# Patient Record
Sex: FEMALE | Race: WHITE | NOT HISPANIC OR LATINO | Employment: OTHER | URBAN - METROPOLITAN AREA
[De-identification: names, ages, dates, MRNs, and addresses within clinical notes are randomized per-mention and may not be internally consistent; named-entity substitution may affect disease eponyms.]

---

## 2019-07-28 ENCOUNTER — HOSPITAL ENCOUNTER (INPATIENT)
Facility: HOSPITAL | Age: 69
LOS: 6 days | Discharge: HOME | End: 2019-08-03
Attending: EMERGENCY MEDICINE | Admitting: OBSTETRICS & GYNECOLOGY

## 2019-07-28 DIAGNOSIS — D64.9 SYMPTOMATIC ANEMIA: ICD-10-CM

## 2019-07-28 DIAGNOSIS — C54.1 ENDOMETRIAL CANCER (HCC): Primary | ICD-10-CM

## 2019-07-28 PROBLEM — D50.9 MICROCYTIC ANEMIA: Status: ACTIVE | Noted: 2019-07-28

## 2019-07-28 PROBLEM — I10 HTN (HYPERTENSION): Status: ACTIVE | Noted: 2019-07-28

## 2019-07-28 PROBLEM — N95.0 POSTMENOPAUSAL BLEEDING: Status: ACTIVE | Noted: 2019-07-28

## 2019-07-28 PROBLEM — R01.1 HEART MURMUR: Status: ACTIVE | Noted: 2019-07-28

## 2019-07-28 PROBLEM — D75.839 THROMBOCYTOSIS: Status: ACTIVE | Noted: 2019-07-28

## 2019-07-28 PROBLEM — E78.5 HLD (HYPERLIPIDEMIA): Status: ACTIVE | Noted: 2019-07-28

## 2019-07-28 PROBLEM — R92.8 ABNORMAL MAMMOGRAM: Status: ACTIVE | Noted: 2019-07-28

## 2019-07-28 PROBLEM — R73.03 PREDIABETES: Status: ACTIVE | Noted: 2019-07-28

## 2019-07-28 LAB
ABO GROUP BLD: NORMAL
ABO GROUP BLD: NORMAL
ANION GAP SERPL CALCULATED.3IONS-SCNC: 17 MMOL/L (ref 5–15)
BASOPHILS # BLD AUTO: 0.05 10*3/MM3 (ref 0–0.2)
BASOPHILS NFR BLD AUTO: 0.5 % (ref 0–1.5)
BLD GP AB SCN SERPL QL: NEGATIVE
BUN BLD-MCNC: 7 MG/DL (ref 8–23)
BUN/CREAT SERPL: 9.1 (ref 7–25)
CALCIUM SPEC-SCNC: 9.6 MG/DL (ref 8.6–10.5)
CHLORIDE SERPL-SCNC: 92 MMOL/L (ref 98–107)
CO2 SERPL-SCNC: 24 MMOL/L (ref 22–29)
CREAT BLD-MCNC: 0.77 MG/DL (ref 0.57–1)
DEPRECATED RDW RBC AUTO: 47.6 FL (ref 37–54)
EOSINOPHIL # BLD AUTO: 0.02 10*3/MM3 (ref 0–0.4)
EOSINOPHIL NFR BLD AUTO: 0.2 % (ref 0.3–6.2)
ERYTHROCYTE [DISTWIDTH] IN BLOOD BY AUTOMATED COUNT: 21.4 % (ref 12.3–15.4)
FERRITIN SERPL-MCNC: 20.98 NG/ML (ref 13–150)
GFR SERPL CREATININE-BSD FRML MDRD: 75 ML/MIN/1.73
GLUCOSE BLD-MCNC: 132 MG/DL (ref 65–99)
HCT VFR BLD AUTO: 25.3 % (ref 34–46.6)
HGB BLD-MCNC: 6.5 G/DL (ref 12–15.9)
HYPOCHROMIA BLD QL: NORMAL
IMM GRANULOCYTES # BLD AUTO: 0.05 10*3/MM3 (ref 0–0.05)
IMM GRANULOCYTES NFR BLD AUTO: 0.5 % (ref 0–0.5)
INR PPP: 1.18 (ref 0.85–1.16)
IRON 24H UR-MRATE: 14 MCG/DL (ref 37–145)
IRON SATN MFR SERPL: 3 % (ref 20–50)
LYMPHOCYTES # BLD AUTO: 3.27 10*3/MM3 (ref 0.7–3.1)
LYMPHOCYTES NFR BLD AUTO: 30.4 % (ref 19.6–45.3)
MCH RBC QN AUTO: 16.4 PG (ref 26.6–33)
MCHC RBC AUTO-ENTMCNC: 25.7 G/DL (ref 31.5–35.7)
MCV RBC AUTO: 63.7 FL (ref 79–97)
MICROCYTES BLD QL: NORMAL
MONOCYTES # BLD AUTO: 0.57 10*3/MM3 (ref 0.1–0.9)
MONOCYTES NFR BLD AUTO: 5.3 % (ref 5–12)
NEUTROPHILS # BLD AUTO: 6.81 10*3/MM3 (ref 1.7–7)
NEUTROPHILS NFR BLD AUTO: 63.1 % (ref 42.7–76)
NRBC BLD AUTO-RTO: 0.3 /100 WBC (ref 0–0.2)
PLAT MORPH BLD: NORMAL
PLATELET # BLD AUTO: 540 10*3/MM3 (ref 140–450)
PMV BLD AUTO: 8.7 FL (ref 6–12)
POTASSIUM BLD-SCNC: 3.6 MMOL/L (ref 3.5–5.2)
PROTHROMBIN TIME: 14.5 SECONDS (ref 11.2–14.3)
RBC # BLD AUTO: 3.97 10*6/MM3 (ref 3.77–5.28)
RETICS # AUTO: 0.09 10*6/MM3 (ref 0.02–0.13)
RETICS/RBC NFR AUTO: 2.36 % (ref 0.7–1.9)
RH BLD: POSITIVE
RH BLD: POSITIVE
SODIUM BLD-SCNC: 133 MMOL/L (ref 136–145)
T&S EXPIRATION DATE: NORMAL
TIBC SERPL-MCNC: 405 MCG/DL (ref 298–536)
TRANSFERRIN SERPL-MCNC: 272 MG/DL (ref 200–360)
WBC MORPH BLD: NORMAL
WBC NRBC COR # BLD: 10.77 10*3/MM3 (ref 3.4–10.8)

## 2019-07-28 PROCEDURE — 99284 EMERGENCY DEPT VISIT MOD MDM: CPT

## 2019-07-28 PROCEDURE — 86850 RBC ANTIBODY SCREEN: CPT | Performed by: EMERGENCY MEDICINE

## 2019-07-28 PROCEDURE — 86900 BLOOD TYPING SEROLOGIC ABO: CPT

## 2019-07-28 PROCEDURE — 84466 ASSAY OF TRANSFERRIN: CPT | Performed by: INTERNAL MEDICINE

## 2019-07-28 PROCEDURE — 99223 1ST HOSP IP/OBS HIGH 75: CPT | Performed by: INTERNAL MEDICINE

## 2019-07-28 PROCEDURE — 86901 BLOOD TYPING SEROLOGIC RH(D): CPT

## 2019-07-28 PROCEDURE — 83540 ASSAY OF IRON: CPT | Performed by: INTERNAL MEDICINE

## 2019-07-28 PROCEDURE — 85610 PROTHROMBIN TIME: CPT | Performed by: INTERNAL MEDICINE

## 2019-07-28 PROCEDURE — 82728 ASSAY OF FERRITIN: CPT | Performed by: INTERNAL MEDICINE

## 2019-07-28 PROCEDURE — 85025 COMPLETE CBC W/AUTO DIFF WBC: CPT | Performed by: EMERGENCY MEDICINE

## 2019-07-28 PROCEDURE — 86900 BLOOD TYPING SEROLOGIC ABO: CPT | Performed by: EMERGENCY MEDICINE

## 2019-07-28 PROCEDURE — 85045 AUTOMATED RETICULOCYTE COUNT: CPT | Performed by: INTERNAL MEDICINE

## 2019-07-28 PROCEDURE — 86923 COMPATIBILITY TEST ELECTRIC: CPT

## 2019-07-28 PROCEDURE — P9016 RBC LEUKOCYTES REDUCED: HCPCS

## 2019-07-28 PROCEDURE — 82746 ASSAY OF FOLIC ACID SERUM: CPT | Performed by: INTERNAL MEDICINE

## 2019-07-28 PROCEDURE — 86901 BLOOD TYPING SEROLOGIC RH(D): CPT | Performed by: EMERGENCY MEDICINE

## 2019-07-28 PROCEDURE — 82607 VITAMIN B-12: CPT | Performed by: INTERNAL MEDICINE

## 2019-07-28 PROCEDURE — 85007 BL SMEAR W/DIFF WBC COUNT: CPT | Performed by: EMERGENCY MEDICINE

## 2019-07-28 PROCEDURE — 80048 BASIC METABOLIC PNL TOTAL CA: CPT | Performed by: EMERGENCY MEDICINE

## 2019-07-28 PROCEDURE — 36430 TRANSFUSION BLD/BLD COMPNT: CPT

## 2019-07-28 RX ORDER — SODIUM CHLORIDE 0.9 % (FLUSH) 0.9 %
10 SYRINGE (ML) INJECTION AS NEEDED
Status: DISCONTINUED | OUTPATIENT
Start: 2019-07-28 | End: 2019-08-03 | Stop reason: HOSPADM

## 2019-07-28 RX ORDER — LISINOPRIL 30 MG/1
30 TABLET ORAL DAILY
COMMUNITY
End: 2019-08-22

## 2019-07-28 RX ORDER — MELATONIN
1000 DAILY
Status: DISCONTINUED | OUTPATIENT
Start: 2019-07-29 | End: 2019-08-03 | Stop reason: HOSPADM

## 2019-07-28 RX ORDER — MULTIPLE VITAMINS W/ MINERALS TAB 9MG-400MCG
1 TAB ORAL DAILY
Status: DISCONTINUED | OUTPATIENT
Start: 2019-07-29 | End: 2019-08-03 | Stop reason: HOSPADM

## 2019-07-28 RX ORDER — FERROUS SULFATE 325(65) MG
325 TABLET ORAL
Status: DISCONTINUED | OUTPATIENT
Start: 2019-07-29 | End: 2019-08-02

## 2019-07-28 RX ORDER — SODIUM CHLORIDE 0.9 % (FLUSH) 0.9 %
3-10 SYRINGE (ML) INJECTION AS NEEDED
Status: DISCONTINUED | OUTPATIENT
Start: 2019-07-28 | End: 2019-08-03 | Stop reason: HOSPADM

## 2019-07-28 RX ORDER — SODIUM CHLORIDE 0.9 % (FLUSH) 0.9 %
3 SYRINGE (ML) INJECTION EVERY 12 HOURS SCHEDULED
Status: DISCONTINUED | OUTPATIENT
Start: 2019-07-28 | End: 2019-08-02

## 2019-07-28 RX ADMIN — SODIUM CHLORIDE, PRESERVATIVE FREE 3 ML: 5 INJECTION INTRAVENOUS at 22:21

## 2019-07-29 ENCOUNTER — APPOINTMENT (OUTPATIENT)
Dept: CT IMAGING | Facility: HOSPITAL | Age: 69
End: 2019-07-29

## 2019-07-29 ENCOUNTER — APPOINTMENT (OUTPATIENT)
Dept: CARDIOLOGY | Facility: HOSPITAL | Age: 69
End: 2019-07-29

## 2019-07-29 LAB
ALBUMIN SERPL-MCNC: 3.4 G/DL (ref 3.5–5.2)
ALBUMIN/GLOB SERPL: 0.9 G/DL
ALP SERPL-CCNC: 88 U/L (ref 39–117)
ALT SERPL W P-5'-P-CCNC: 8 U/L (ref 1–33)
ANION GAP SERPL CALCULATED.3IONS-SCNC: 10 MMOL/L (ref 5–15)
AST SERPL-CCNC: 12 U/L (ref 1–32)
BASOPHILS # BLD AUTO: 0.03 10*3/MM3 (ref 0–0.2)
BASOPHILS NFR BLD AUTO: 0.5 % (ref 0–1.5)
BH CV ECHO MEAS - AO MAX PG (FULL): 14.4 MMHG
BH CV ECHO MEAS - AO MAX PG: 21.1 MMHG
BH CV ECHO MEAS - AO MEAN PG (FULL): 8.4 MMHG
BH CV ECHO MEAS - AO MEAN PG: 11.7 MMHG
BH CV ECHO MEAS - AO ROOT AREA (BSA CORRECTED): 1.4
BH CV ECHO MEAS - AO ROOT AREA: 7.3 CM^2
BH CV ECHO MEAS - AO ROOT DIAM: 3 CM
BH CV ECHO MEAS - AO V2 MAX: 229.6 CM/SEC
BH CV ECHO MEAS - AO V2 MEAN: 157.9 CM/SEC
BH CV ECHO MEAS - AO V2 VTI: 48.8 CM
BH CV ECHO MEAS - AVA(I,A): 1.7 CM^2
BH CV ECHO MEAS - AVA(I,D): 1.7 CM^2
BH CV ECHO MEAS - AVA(V,A): 1.7 CM^2
BH CV ECHO MEAS - AVA(V,D): 1.7 CM^2
BH CV ECHO MEAS - BSA(HAYCOCK): 2.3 M^2
BH CV ECHO MEAS - BSA: 2.2 M^2
BH CV ECHO MEAS - BZI_BMI: 33.2 KILOGRAMS/M^2
BH CV ECHO MEAS - BZI_METRIC_HEIGHT: 175.3 CM
BH CV ECHO MEAS - BZI_METRIC_WEIGHT: 102.1 KG
BH CV ECHO MEAS - EDV(CUBED): 79.8 ML
BH CV ECHO MEAS - EDV(MOD-SP2): 80 ML
BH CV ECHO MEAS - EDV(MOD-SP4): 88 ML
BH CV ECHO MEAS - EDV(TEICH): 83.3 ML
BH CV ECHO MEAS - EF(CUBED): 78.6 %
BH CV ECHO MEAS - EF(MOD-BP): 69 %
BH CV ECHO MEAS - EF(MOD-SP2): 62.5 %
BH CV ECHO MEAS - EF(MOD-SP4): 73.9 %
BH CV ECHO MEAS - EF(TEICH): 71.1 %
BH CV ECHO MEAS - ESV(CUBED): 17.1 ML
BH CV ECHO MEAS - ESV(MOD-SP2): 30 ML
BH CV ECHO MEAS - ESV(MOD-SP4): 23 ML
BH CV ECHO MEAS - ESV(TEICH): 24.1 ML
BH CV ECHO MEAS - FS: 40.2 %
BH CV ECHO MEAS - IVS/LVPW: 1
BH CV ECHO MEAS - IVSD: 1.3 CM
BH CV ECHO MEAS - LA DIMENSION: 4.2 CM
BH CV ECHO MEAS - LA/AO: 1.4
BH CV ECHO MEAS - LAD MAJOR: 5.2 CM
BH CV ECHO MEAS - LAT PEAK E' VEL: 3.9 CM/SEC
BH CV ECHO MEAS - LATERAL E/E' RATIO: 32.2
BH CV ECHO MEAS - LV DIASTOLIC VOL/BSA (35-75): 40.5 ML/M^2
BH CV ECHO MEAS - LV MASS(C)D: 211.2 GRAMS
BH CV ECHO MEAS - LV MASS(C)DI: 97.3 GRAMS/M^2
BH CV ECHO MEAS - LV MAX PG: 6.7 MMHG
BH CV ECHO MEAS - LV MEAN PG: 3.3 MMHG
BH CV ECHO MEAS - LV SYSTOLIC VOL/BSA (12-30): 10.6 ML/M^2
BH CV ECHO MEAS - LV V1 MAX: 129.4 CM/SEC
BH CV ECHO MEAS - LV V1 MEAN: 83.8 CM/SEC
BH CV ECHO MEAS - LV V1 VTI: 27.4 CM
BH CV ECHO MEAS - LVIDD: 4.3 CM
BH CV ECHO MEAS - LVIDS: 2.6 CM
BH CV ECHO MEAS - LVLD AP2: 6.5 CM
BH CV ECHO MEAS - LVLD AP4: 6.6 CM
BH CV ECHO MEAS - LVLS AP2: 6.3 CM
BH CV ECHO MEAS - LVLS AP4: 6.2 CM
BH CV ECHO MEAS - LVOT AREA (M): 3.1 CM^2
BH CV ECHO MEAS - LVOT AREA: 3 CM^2
BH CV ECHO MEAS - LVOT DIAM: 2 CM
BH CV ECHO MEAS - LVPWD: 1.3 CM
BH CV ECHO MEAS - MED PEAK E' VEL: 5.3 CM/SEC
BH CV ECHO MEAS - MEDIAL E/E' RATIO: 23.7
BH CV ECHO MEAS - MV A MAX VEL: 123.4 CM/SEC
BH CV ECHO MEAS - MV DEC TIME: 0.25 SEC
BH CV ECHO MEAS - MV E MAX VEL: 127.3 CM/SEC
BH CV ECHO MEAS - MV E/A: 1
BH CV ECHO MEAS - PA ACC SLOPE: 890.1 CM/SEC^2
BH CV ECHO MEAS - PA ACC TIME: 0.09 SEC
BH CV ECHO MEAS - PA PR(ACCEL): 37.4 MMHG
BH CV ECHO MEAS - PULM DIAS VEL: 46.8 CM/SEC
BH CV ECHO MEAS - PULM S/D: 1.3
BH CV ECHO MEAS - PULM SYS VEL: 59.8 CM/SEC
BH CV ECHO MEAS - RAP SYSTOLE: 3 MMHG
BH CV ECHO MEAS - RVSP: 27 MMHG
BH CV ECHO MEAS - SI(AO): 163.4 ML/M^2
BH CV ECHO MEAS - SI(CUBED): 28.9 ML/M^2
BH CV ECHO MEAS - SI(LVOT): 37.9 ML/M^2
BH CV ECHO MEAS - SI(MOD-SP2): 23 ML/M^2
BH CV ECHO MEAS - SI(MOD-SP4): 29.9 ML/M^2
BH CV ECHO MEAS - SI(TEICH): 27.3 ML/M^2
BH CV ECHO MEAS - SV(AO): 354.8 ML
BH CV ECHO MEAS - SV(CUBED): 62.7 ML
BH CV ECHO MEAS - SV(LVOT): 82.4 ML
BH CV ECHO MEAS - SV(MOD-SP2): 50 ML
BH CV ECHO MEAS - SV(MOD-SP4): 65 ML
BH CV ECHO MEAS - SV(TEICH): 59.3 ML
BH CV ECHO MEAS - TAPSE (>1.6): 1.5 CM2
BH CV ECHO MEAS - TR MAX PG: 24 MMHG
BH CV ECHO MEAS - TR MAX VEL: 242.2 CM/SEC
BH CV ECHO MEASUREMENTS AVERAGE E/E' RATIO: 27.67
BH CV VAS BP LEFT ARM: NORMAL MMHG
BH CV XLRA - RV BASE: 3.1 CM
BH CV XLRA - RV LENGTH: 6.9 CM
BH CV XLRA - RV MID: 3.2 CM
BH CV XLRA - TDI S': 17.7 CM/SEC
BILIRUB SERPL-MCNC: 0.9 MG/DL (ref 0.2–1.2)
BUN BLD-MCNC: 6 MG/DL (ref 8–23)
BUN/CREAT SERPL: 9.5 (ref 7–25)
CALCIUM SPEC-SCNC: 9.3 MG/DL (ref 8.6–10.5)
CHLORIDE SERPL-SCNC: 101 MMOL/L (ref 98–107)
CO2 SERPL-SCNC: 26 MMOL/L (ref 22–29)
CREAT BLD-MCNC: 0.63 MG/DL (ref 0.57–1)
DEPRECATED RDW RBC AUTO: 59.4 FL (ref 37–54)
EOSINOPHIL # BLD AUTO: 0.06 10*3/MM3 (ref 0–0.4)
EOSINOPHIL NFR BLD AUTO: 0.9 % (ref 0.3–6.2)
ERYTHROCYTE [DISTWIDTH] IN BLOOD BY AUTOMATED COUNT: 24.2 % (ref 12.3–15.4)
FOLATE SERPL-MCNC: 19.1 NG/ML (ref 4.78–24.2)
GFR SERPL CREATININE-BSD FRML MDRD: 94 ML/MIN/1.73
GLOBULIN UR ELPH-MCNC: 3.6 GM/DL
GLUCOSE BLD-MCNC: 131 MG/DL (ref 65–99)
HBA1C MFR BLD: 7.2 % (ref 4.8–5.6)
HCT VFR BLD AUTO: 27.4 % (ref 34–46.6)
HCT VFR BLD AUTO: 27.7 % (ref 34–46.6)
HCT VFR BLD AUTO: 27.7 % (ref 34–46.6)
HCT VFR BLD AUTO: 28.4 % (ref 34–46.6)
HCT VFR BLD AUTO: 28.7 % (ref 34–46.6)
HGB BLD-MCNC: 7.7 G/DL (ref 12–15.9)
HGB BLD-MCNC: 7.7 G/DL (ref 12–15.9)
HGB BLD-MCNC: 7.8 G/DL (ref 12–15.9)
HGB BLD-MCNC: 7.9 G/DL (ref 12–15.9)
HGB BLD-MCNC: 8.2 G/DL (ref 12–15.9)
IMM GRANULOCYTES # BLD AUTO: 0.02 10*3/MM3 (ref 0–0.05)
IMM GRANULOCYTES NFR BLD AUTO: 0.3 % (ref 0–0.5)
LEFT ATRIUM VOLUME INDEX: 26.7 ML/M^2
LEFT ATRIUM VOLUME: 58 ML
LYMPHOCYTES # BLD AUTO: 2 10*3/MM3 (ref 0.7–3.1)
LYMPHOCYTES NFR BLD AUTO: 31.3 % (ref 19.6–45.3)
MCH RBC QN AUTO: 19.1 PG (ref 26.6–33)
MCHC RBC AUTO-ENTMCNC: 27.8 G/DL (ref 31.5–35.7)
MCV RBC AUTO: 68.7 FL (ref 79–97)
MONOCYTES # BLD AUTO: 0.61 10*3/MM3 (ref 0.1–0.9)
MONOCYTES NFR BLD AUTO: 9.5 % (ref 5–12)
NEUTROPHILS # BLD AUTO: 3.68 10*3/MM3 (ref 1.7–7)
NEUTROPHILS NFR BLD AUTO: 57.5 % (ref 42.7–76)
NRBC BLD AUTO-RTO: 0.5 /100 WBC (ref 0–0.2)
PLATELET # BLD AUTO: 402 10*3/MM3 (ref 140–450)
PMV BLD AUTO: 8.7 FL (ref 6–12)
POTASSIUM BLD-SCNC: 3.9 MMOL/L (ref 3.5–5.2)
PROT SERPL-MCNC: 7 G/DL (ref 6–8.5)
RBC # BLD AUTO: 4.03 10*6/MM3 (ref 3.77–5.28)
SODIUM BLD-SCNC: 137 MMOL/L (ref 136–145)
TSH SERPL DL<=0.05 MIU/L-ACNC: 2.21 MIU/ML (ref 0.27–4.2)
VIT B12 BLD-MCNC: 640 PG/ML (ref 211–946)
WBC NRBC COR # BLD: 6.4 10*3/MM3 (ref 3.4–10.8)

## 2019-07-29 PROCEDURE — A9270 NON-COVERED ITEM OR SERVICE: HCPCS | Performed by: INTERNAL MEDICINE

## 2019-07-29 PROCEDURE — 86900 BLOOD TYPING SEROLOGIC ABO: CPT

## 2019-07-29 PROCEDURE — 93306 TTE W/DOPPLER COMPLETE: CPT | Performed by: INTERNAL MEDICINE

## 2019-07-29 PROCEDURE — 85014 HEMATOCRIT: CPT | Performed by: INTERNAL MEDICINE

## 2019-07-29 PROCEDURE — 99222 1ST HOSP IP/OBS MODERATE 55: CPT | Performed by: OBSTETRICS & GYNECOLOGY

## 2019-07-29 PROCEDURE — 84443 ASSAY THYROID STIM HORMONE: CPT | Performed by: INTERNAL MEDICINE

## 2019-07-29 PROCEDURE — P9016 RBC LEUKOCYTES REDUCED: HCPCS

## 2019-07-29 PROCEDURE — 63710000001 VITAMIN E 100 UNIT CAPSULE: Performed by: INTERNAL MEDICINE

## 2019-07-29 PROCEDURE — 25010000002 IOPAMIDOL 61 % SOLUTION: Performed by: INTERNAL MEDICINE

## 2019-07-29 PROCEDURE — 93306 TTE W/DOPPLER COMPLETE: CPT

## 2019-07-29 PROCEDURE — 63710000001 LISINOPRIL 10 MG TABLET: Performed by: INTERNAL MEDICINE

## 2019-07-29 PROCEDURE — 0 DIATRIZOATE MEGLUMINE & SODIUM PER 1 ML

## 2019-07-29 PROCEDURE — 63710000001 LISINOPRIL 20 MG TABLET: Performed by: INTERNAL MEDICINE

## 2019-07-29 PROCEDURE — 85018 HEMOGLOBIN: CPT | Performed by: INTERNAL MEDICINE

## 2019-07-29 PROCEDURE — 80053 COMPREHEN METABOLIC PANEL: CPT | Performed by: INTERNAL MEDICINE

## 2019-07-29 PROCEDURE — 36430 TRANSFUSION BLD/BLD COMPNT: CPT

## 2019-07-29 PROCEDURE — 85025 COMPLETE CBC W/AUTO DIFF WBC: CPT | Performed by: INTERNAL MEDICINE

## 2019-07-29 PROCEDURE — 63710000001 CHOLECALCIFEROL 1000 UNITS TABLET: Performed by: INTERNAL MEDICINE

## 2019-07-29 PROCEDURE — 99233 SBSQ HOSP IP/OBS HIGH 50: CPT | Performed by: INTERNAL MEDICINE

## 2019-07-29 PROCEDURE — 63710000001 FERROUS SULFATE 325 (65 FE) MG TABLET: Performed by: INTERNAL MEDICINE

## 2019-07-29 PROCEDURE — 83036 HEMOGLOBIN GLYCOSYLATED A1C: CPT | Performed by: INTERNAL MEDICINE

## 2019-07-29 PROCEDURE — 71260 CT THORAX DX C+: CPT

## 2019-07-29 PROCEDURE — 85007 BL SMEAR W/DIFF WBC COUNT: CPT | Performed by: INTERNAL MEDICINE

## 2019-07-29 PROCEDURE — 63710000001 CERTAVITE/ANTIOXIDANTS TABLET: Performed by: INTERNAL MEDICINE

## 2019-07-29 PROCEDURE — 74177 CT ABD & PELVIS W/CONTRAST: CPT

## 2019-07-29 RX ADMIN — MULTIPLE VITAMINS W/ MINERALS TAB 1 TABLET: TAB ORAL at 09:05

## 2019-07-29 RX ADMIN — VITAMIN E CAP 100 UNIT 100 UNITS: 100 CAP at 10:09

## 2019-07-29 RX ADMIN — VITAMIN D, TAB 1000IU (100/BT) 1000 UNITS: 25 TAB at 09:05

## 2019-07-29 RX ADMIN — DIATRIZOATE MEGLUMINE AND DIATRIZOATE SODIUM 15 ML: 660; 100 LIQUID ORAL; RECTAL at 16:15

## 2019-07-29 RX ADMIN — IOPAMIDOL 95 ML: 612 INJECTION, SOLUTION INTRAVENOUS at 17:30

## 2019-07-29 RX ADMIN — SODIUM CHLORIDE, PRESERVATIVE FREE 3 ML: 5 INJECTION INTRAVENOUS at 20:14

## 2019-07-29 RX ADMIN — LISINOPRIL 30 MG: 20 TABLET ORAL at 20:14

## 2019-07-29 RX ADMIN — FERROUS SULFATE TAB 325 MG (65 MG ELEMENTAL FE) 325 MG: 325 (65 FE) TAB at 09:05

## 2019-07-29 RX ADMIN — Medication: at 16:15

## 2019-07-30 LAB
ABO + RH BLD: NORMAL
ABO + RH BLD: NORMAL
BH BB BLOOD EXPIRATION DATE: NORMAL
BH BB BLOOD EXPIRATION DATE: NORMAL
BH BB BLOOD TYPE BARCODE: 6200
BH BB BLOOD TYPE BARCODE: 6200
BH BB DISPENSE STATUS: NORMAL
BH BB DISPENSE STATUS: NORMAL
BH BB PRODUCT CODE: NORMAL
BH BB PRODUCT CODE: NORMAL
BH BB UNIT NUMBER: NORMAL
BH BB UNIT NUMBER: NORMAL
CANCER AG125 SERPL QL: 9.8 U/ML (ref 0–38.1)
HCT VFR BLD AUTO: 28.6 % (ref 34–46.6)
HCT VFR BLD AUTO: 29.3 % (ref 34–46.6)
HCT VFR BLD AUTO: 29.3 % (ref 34–46.6)
HGB BLD-MCNC: 8.1 G/DL (ref 12–15.9)
HGB BLD-MCNC: 8.2 G/DL (ref 12–15.9)
HGB BLD-MCNC: 8.2 G/DL (ref 12–15.9)
UNIT  ABO: NORMAL
UNIT  ABO: NORMAL
UNIT  RH: NORMAL
UNIT  RH: NORMAL

## 2019-07-30 PROCEDURE — 36430 TRANSFUSION BLD/BLD COMPNT: CPT

## 2019-07-30 PROCEDURE — 86923 COMPATIBILITY TEST ELECTRIC: CPT

## 2019-07-30 PROCEDURE — P9016 RBC LEUKOCYTES REDUCED: HCPCS

## 2019-07-30 PROCEDURE — 85014 HEMATOCRIT: CPT | Performed by: INTERNAL MEDICINE

## 2019-07-30 PROCEDURE — 99233 SBSQ HOSP IP/OBS HIGH 50: CPT | Performed by: OBSTETRICS & GYNECOLOGY

## 2019-07-30 PROCEDURE — 86304 IMMUNOASSAY TUMOR CA 125: CPT | Performed by: OBSTETRICS & GYNECOLOGY

## 2019-07-30 PROCEDURE — 99233 SBSQ HOSP IP/OBS HIGH 50: CPT | Performed by: INTERNAL MEDICINE

## 2019-07-30 PROCEDURE — 86900 BLOOD TYPING SEROLOGIC ABO: CPT

## 2019-07-30 PROCEDURE — 85018 HEMOGLOBIN: CPT | Performed by: INTERNAL MEDICINE

## 2019-07-30 RX ORDER — MEDROXYPROGESTERONE ACETATE 10 MG/1
30 TABLET ORAL DAILY
Status: DISCONTINUED | OUTPATIENT
Start: 2019-07-30 | End: 2019-08-02

## 2019-07-30 RX ADMIN — MEDROXYPROGESTERONE ACETATE 30 MG: 10 TABLET ORAL at 16:28

## 2019-07-30 RX ADMIN — SODIUM CHLORIDE, PRESERVATIVE FREE 3 ML: 5 INJECTION INTRAVENOUS at 21:31

## 2019-07-30 RX ADMIN — MULTIPLE VITAMINS W/ MINERALS TAB 1 TABLET: TAB ORAL at 08:17

## 2019-07-30 RX ADMIN — LISINOPRIL 30 MG: 20 TABLET ORAL at 21:27

## 2019-07-30 RX ADMIN — VITAMIN D, TAB 1000IU (100/BT) 1000 UNITS: 25 TAB at 08:17

## 2019-07-30 RX ADMIN — FERROUS SULFATE TAB 325 MG (65 MG ELEMENTAL FE) 325 MG: 325 (65 FE) TAB at 08:17

## 2019-07-30 RX ADMIN — VITAMIN E CAP 100 UNIT 100 UNITS: 100 CAP at 08:16

## 2019-07-31 LAB
ABO + RH BLD: NORMAL
BH BB BLOOD EXPIRATION DATE: NORMAL
BH BB BLOOD TYPE BARCODE: 6200
BH BB DISPENSE STATUS: NORMAL
BH BB PRODUCT CODE: NORMAL
BH BB UNIT NUMBER: NORMAL
HCT VFR BLD AUTO: 28.1 % (ref 34–46.6)
HCT VFR BLD AUTO: 30.2 % (ref 34–46.6)
HCT VFR BLD AUTO: 31 % (ref 34–46.6)
HGB BLD-MCNC: 8 G/DL (ref 12–15.9)
HGB BLD-MCNC: 8.6 G/DL (ref 12–15.9)
HGB BLD-MCNC: 8.9 G/DL (ref 12–15.9)
UNIT  ABO: NORMAL
UNIT  RH: NORMAL

## 2019-07-31 PROCEDURE — 99232 SBSQ HOSP IP/OBS MODERATE 35: CPT | Performed by: INTERNAL MEDICINE

## 2019-07-31 PROCEDURE — 85018 HEMOGLOBIN: CPT | Performed by: INTERNAL MEDICINE

## 2019-07-31 PROCEDURE — 85014 HEMATOCRIT: CPT | Performed by: INTERNAL MEDICINE

## 2019-07-31 PROCEDURE — 99231 SBSQ HOSP IP/OBS SF/LOW 25: CPT | Performed by: OBSTETRICS & GYNECOLOGY

## 2019-07-31 RX ADMIN — LISINOPRIL 30 MG: 20 TABLET ORAL at 20:10

## 2019-07-31 RX ADMIN — FERROUS SULFATE TAB 325 MG (65 MG ELEMENTAL FE) 325 MG: 325 (65 FE) TAB at 09:08

## 2019-07-31 RX ADMIN — MULTIPLE VITAMINS W/ MINERALS TAB 1 TABLET: TAB ORAL at 09:08

## 2019-07-31 RX ADMIN — SODIUM CHLORIDE, PRESERVATIVE FREE 3 ML: 5 INJECTION INTRAVENOUS at 09:09

## 2019-07-31 RX ADMIN — MEDROXYPROGESTERONE ACETATE 30 MG: 10 TABLET ORAL at 09:08

## 2019-07-31 RX ADMIN — VITAMIN E CAP 100 UNIT 100 UNITS: 100 CAP at 09:08

## 2019-07-31 RX ADMIN — VITAMIN D, TAB 1000IU (100/BT) 1000 UNITS: 25 TAB at 09:08

## 2019-07-31 RX ADMIN — SODIUM CHLORIDE, PRESERVATIVE FREE 3 ML: 5 INJECTION INTRAVENOUS at 20:19

## 2019-08-01 PROBLEM — C54.1 ENDOMETRIAL CANCER (HCC): Status: ACTIVE | Noted: 2019-07-28

## 2019-08-01 LAB
HCT VFR BLD AUTO: 31.6 % (ref 34–46.6)
HCT VFR BLD AUTO: 32 % (ref 34–46.6)
HGB BLD-MCNC: 8.8 G/DL (ref 12–15.9)
HGB BLD-MCNC: 9 G/DL (ref 12–15.9)

## 2019-08-01 PROCEDURE — 85018 HEMOGLOBIN: CPT | Performed by: INTERNAL MEDICINE

## 2019-08-01 PROCEDURE — 85014 HEMATOCRIT: CPT | Performed by: INTERNAL MEDICINE

## 2019-08-01 PROCEDURE — 99231 SBSQ HOSP IP/OBS SF/LOW 25: CPT | Performed by: OBSTETRICS & GYNECOLOGY

## 2019-08-01 PROCEDURE — 99232 SBSQ HOSP IP/OBS MODERATE 35: CPT | Performed by: INTERNAL MEDICINE

## 2019-08-01 RX ORDER — PREGABALIN 75 MG/1
150 CAPSULE ORAL ONCE
Status: COMPLETED | OUTPATIENT
Start: 2019-08-02 | End: 2019-08-02

## 2019-08-01 RX ORDER — ACETAMINOPHEN 325 MG/1
650 TABLET ORAL ONCE
Status: COMPLETED | OUTPATIENT
Start: 2019-08-02 | End: 2019-08-02

## 2019-08-01 RX ORDER — SODIUM CHLORIDE 9 MG/ML
75 INJECTION, SOLUTION INTRAVENOUS CONTINUOUS
Status: DISCONTINUED | OUTPATIENT
Start: 2019-08-02 | End: 2019-08-02

## 2019-08-01 RX ADMIN — LISINOPRIL 30 MG: 20 TABLET ORAL at 20:11

## 2019-08-01 RX ADMIN — FERROUS SULFATE TAB 325 MG (65 MG ELEMENTAL FE) 325 MG: 325 (65 FE) TAB at 08:17

## 2019-08-01 RX ADMIN — MEDROXYPROGESTERONE ACETATE 30 MG: 10 TABLET ORAL at 08:17

## 2019-08-01 RX ADMIN — VITAMIN E CAP 100 UNIT 100 UNITS: 100 CAP at 08:17

## 2019-08-01 RX ADMIN — SODIUM CHLORIDE, PRESERVATIVE FREE 3 ML: 5 INJECTION INTRAVENOUS at 20:11

## 2019-08-01 RX ADMIN — MULTIPLE VITAMINS W/ MINERALS TAB 1 TABLET: TAB ORAL at 08:18

## 2019-08-01 RX ADMIN — VITAMIN D, TAB 1000IU (100/BT) 1000 UNITS: 25 TAB at 08:18

## 2019-08-01 NOTE — PLAN OF CARE
Problem: Anemia (Adult)  Goal: Identify Related Risk Factors and Signs and Symptoms  Outcome: Ongoing (interventions implemented as appropriate)    Goal: Symptom Improvement  Outcome: Ongoing (interventions implemented as appropriate)

## 2019-08-01 NOTE — PROGRESS NOTES
Gynecologic Oncology   Daily Progress Note    Subjective   Patient did well overnight.  She is just having spotting now.  Her pain is controlled.  She is not having nausea or emesis.  She is tolerating a regular diet.  She is voiding spontaneously and is passing gas.  She is ambulating.  She is using her incentive spirometer.  Denies fever, chills, chest pain, shortness of air.      Objective   Temp:  [97.7 °F (36.5 °C)-98.9 °F (37.2 °C)] 97.7 °F (36.5 °C)  Heart Rate:  [65-88] 65  Resp:  [18] 18  BP: (116-154)/(58-80) 116/58  Vitals:    08/01/19 0313   BP: 116/58   Pulse: 65   Resp: 18   Temp: 97.7 °F (36.5 °C)   SpO2:      I/O last 3 completed shifts:  In: 360 [Blood:360]  Out: -      GENERAL: Alert, well-appearing female in no apparent distress.    CARDIOVASCULAR: Normal rate, regular rhythm, no murmurs, rubs, or gallops.    RESPIRATORY: Clear to auscultation bilaterally, normal respiratory effort  GASTROINTESTINAL:  Soft, appropriately tender, non-distended, no rebound or guarding.  Positive bowel sounds.   SKIN:  Warm, dry, well-perfused.    PSYCHIATRIC: AO x3, with appropriate affect, normal thought processes  EXREMITIES: Symmetric. No peripheral edema.    Lab Results   Component Value Date    WBC 6.40 07/29/2019    HGB 8.8 (L) 08/01/2019    HCT 31.6 (L) 08/01/2019    MCV 68.7 (L) 07/29/2019     07/29/2019    NEUTROABS 3.68 07/29/2019    GLUCOSE 131 (H) 07/29/2019    BUN 6 (L) 07/29/2019    CREATININE 0.63 07/29/2019    EGFRIFNONA 94 07/29/2019     07/29/2019    K 3.9 07/29/2019     07/29/2019    CO2 26.0 07/29/2019    CALCIUM 9.3 07/29/2019         Assessment/Plan   This is a 68 y.o. woman with a recent diagnosis of high-grade papillary serous endometrial cancer and vaginal bleeding causing anemia.     - CT scan 7/29/19.  There are some marginal enlarged lymph nodes.  Uterus is prominent.  There is no carcinomatosis or ascites noted.  Findings were overall consistent with patient's known  cancer.  There is cholelithiasis  - plan for OR Friday August 2 for TLH, BSO, LND, omentectomy  -medroxyprogesterone acetate 30 mg q day   - normal   -Patient's vaginal bleeding has improved  -continue to monitor hematocrit, s/p total of 3 units PRBC  -NPO with IVF at midnight           Amanda Bauer MD  08/01/19  8:09 AM    I saw and evaluated the patient. I agree with the findings and the plan of care as documented in the note.    Plan for surgery 8/2/2019.  Patient can either be discharged or stay in the hospital.  If she is discharged home, she needs to arrive at the hospital at 11 AM.    She would need to keep type and screen up-to-date and red armband on if she were to be discharged today.    ECHO this hospital stay  · Left ventricular systolic function is normal. Calculated EF = 69.0%. Estimated EF appears to be in the range of 61 - 65%  · Left ventricular wall thickness is consistent with mild-to-moderate concentric hypertrophy  · Left ventricular diastolic dysfunction is noted (grade I a w/high LAP) consistent with impaired relaxation  · Moderate mitral annular calcification with trace mitral regurgitation.  · The aortic valve is abnormal in structure. There is moderate calcification of the aortic valve. Mild aortic valve stenosis is present. Mean gradient 12 mmHg    She stays in the hospital or is discharged, she will need to use chlorhexidine wipes on her abdomen this p.m. and tomorrow a.m. in preparation for surgery.  N.p.o. after midnight.  IV fluids ordered.      Lolis Aquino MD  08/01/19  9:03 AM

## 2019-08-01 NOTE — PROGRESS NOTES
Continued Stay Note  OLIVE Farrell     Patient Name: Hailey Osborn  MRN: [id number]  Today's Date: 8/1/2019    Admit Date: 7/28/2019    Discharge Plan     Row Name 08/01/19 1428       Plan    Plan  update    Patient/Family in Agreement with Plan  yes    Plan Comments  Spoke with patient and spouse at bedside regarding discharge plan.  Patient reports that she is scheduled to have surgery tomorrow.  No discharge needs verbalized at this time.  CM will conitnue to follow for discharge planning post procedure.  Patient plan is to discharge home via car with family to transport when medically ready.     Final Discharge Disposition Code  01 - home or self-care        Discharge Codes    No documentation.       Expected Discharge Date and Time     Expected Discharge Date Expected Discharge Time    Aug 2, 2019             Kelli Alvarez RN

## 2019-08-01 NOTE — PROGRESS NOTES
[facility] Medicine Services  PROGRESS NOTE    Patient Name: Hailey Osborn  : 1950  MRN: [id number]    Date of Admission: 2019  Length of Stay: 4  Primary Care Physician: Taylor Jones APRN    Subjective   Subjective     CC:  Vaginal bleeding.    HPI:  Resting in bed in no acute distress and overall comfortable.  Tells me that vaginal bleeding has  slowed very significantly down to just spotting..  Denies any fever or chills.  No chest pain, palpitation, shortness of breath at rest.  No nausea, vomiting, diarrhea, abdominal pain.      Review of Systems      Otherwise ROS is negative except as mentioned in the HPI.    Objective   Objective     Vital Signs:   Temp:  [97.4 °F (36.3 °C)-98.9 °F (37.2 °C)] 97.4 °F (36.3 °C)  Heart Rate:  [65-88] 75  Resp:  [17-18] 17  BP: (116-151)/(58-70) 124/59        Physical Exam:  Constitutional: Awake, alert  Eyes: PERRLA, sclerae anicteric, no conjunctival injection  HENT: NCAT, mucous membranes moist  Neck: Supple, no thyromegaly, no lymphadenopathy, trachea midline  Respiratory: Clear to auscultation bilaterally, nonlabored respirations   Cardiovascular: RRR, systolic apical murmurs, rubs, or gallops.  Gastrointestinal: Positive bowel sounds, soft, nontender, nondistended  Musculoskeletal: No bilateral ankle edema, no clubbing or cyanosis to extremities  Psychiatric: Appropriate affect, cooperative  Neurologic: Oriented x 3, strength symmetric in all extremities, Cranial Nerves grossly intact to confrontation, speech clear  Skin: No rashes    Results Reviewed:  I have personally reviewed current lab, radiology, and data and agree.    Results from last 7 days   Lab Units 19  0825 19  0056 19  1541  19  0444 19  2119 19  1650   WBC 10*3/mm3  --   --   --   --  6.40  --  10.77   HEMOGLOBIN g/dL 9.0* 8.8* 8.6*   < > 7.7*  7.7*  --  6.5*   HEMATOCRIT % 32.0* 31.6* 30.2*   < > 27.7*  27.7*  --  25.3*    PLATELETS 10*3/mm3  --   --   --   --  402  --  540*   INR   --   --   --   --   --  1.18*  --     < > = values in this interval not displayed.     Results from last 7 days   Lab Units 07/29/19  0444 07/28/19  1650   SODIUM mmol/L 137 133*   POTASSIUM mmol/L 3.9 3.6   CHLORIDE mmol/L 101 92*   CO2 mmol/L 26.0 24.0   BUN mg/dL 6* 7*   CREATININE mg/dL 0.63 0.77   GLUCOSE mg/dL 131* 132*   CALCIUM mg/dL 9.3 9.6   ALT (SGPT) U/L 8  --    AST (SGOT) U/L 12  --      Estimated Creatinine Clearance: 83.2 mL/min (by C-G formula based on SCr of 0.63 mg/dL).    Microbiology Results Abnormal     None          Imaging Results (last 24 hours)     Procedure Component Value Units Date/Time    CT Abdomen Pelvis With Contrast [[id number]] Collected:  07/30/19 0930     Updated:  07/31/19 2238    Narrative:       EXAMINATION: CT ABDOMEN AND PELVIS WITH CONTRAST-, CT CHEST WITH  CONTRAST-07/29/2019:      INDICATION: Neoplasm: endometrial, suspected; D64.9-Anemia, unspecified;  C54.1-Malignant neoplasm of endometrium.     TECHNIQUE: 5 mm post-IV contrast images of the chest and 5 mm postoral  and IV contrast portal venous phase and delayed venous phase images  through the abdomen and pelvis.     The radiation dose reduction device was turned on for each scan per the  ALARA (As Low as Reasonably Achievable) protocol.     COMPARISON: NONE.     FINDINGS: The patient indicates endometrial cancer, treatment planning,  history of vaginal bleeding and lower abdominal pain.     CHEST CT SCAN WITH IV CONTRAST: Mediastinal window images show no  evidence of significant mediastinal, hilar, or axillary adenopathy. No  pericardial or pleural effusion is seen. Lung window images show the  lungs to appear clear except for a few granulomatous calcifications. The  bony structures appear to be intact.       Impression:       No evidence of metastatic disease or other active disease in  the chest.     ABDOMEN AND PELVIS CT SCAN WITH ORAL AND IV CONTRAST:   There is diffuse  fatty liver change. Calcified gallstones are seen in the otherwise  normal appearing gallbladder. No significant abnormalities are seen of  the spleen, pancreas, adrenal glands, or kidneys. No upper abdominal  ascites or acute inflammatory change is appreciated. No definite  adenopathy is seen. There is a single 7 mm right mid abdominal precaval  node which should be followed for stability. The bowel loops are normal  in caliber and normal in appearance.     Regarding the lower abdomen and pelvis, the uterus is premenopausal in  size, mildly enlarged to approximately 7.4 x 6.2 cm in axial diameters.  Delayed images appear to show most of the uterus occupied by a rounded 6  cm mass, heterogeneous in appearance consistent with the patient's known  neoplasm. The cervix appears grossly normal. The ovaries appear to be  atrophic. No definite pelvic sidewall adenopathy is seen. The included  inguinal lymph nodes are relatively small, one of these appearing  rounded, 11 mm in diameter in the left inguinal region, which probably  should be followed for stability.     IMPRESSION:   1. Enlarged heterogeneous uterus, with evidence of underlying central  mass, presumably the patient's known neoplasm.  2. No findings particularly suspicious for metastatic disease or other  acute disease in the abdomen or pelvis.  3. Rounded 7 mm precaval node, and rounded 11 mm left inguinal lymph  node, not definitely abnormal, but which should be followed for  stability.  4. Cholelithiasis but no CT scan evidence of cholecystitis. Fatty liver  change noted.     D:  07/30/2019  E:  07/30/2019     This report was finalized on 7/31/2019 10:35 PM by DR. Quique Squires MD.       CT Chest With Contrast [[id number]] Collected:  07/30/19 0930     Updated:  07/31/19 2238    Narrative:       EXAMINATION: CT ABDOMEN AND PELVIS WITH CONTRAST-, CT CHEST WITH  CONTRAST-07/29/2019:      INDICATION: Neoplasm: endometrial, suspected;  D64.9-Anemia, unspecified;  C54.1-Malignant neoplasm of endometrium.     TECHNIQUE: 5 mm post-IV contrast images of the chest and 5 mm postoral  and IV contrast portal venous phase and delayed venous phase images  through the abdomen and pelvis.     The radiation dose reduction device was turned on for each scan per the  ALARA (As Low as Reasonably Achievable) protocol.     COMPARISON: NONE.     FINDINGS: The patient indicates endometrial cancer, treatment planning,  history of vaginal bleeding and lower abdominal pain.     CHEST CT SCAN WITH IV CONTRAST: Mediastinal window images show no  evidence of significant mediastinal, hilar, or axillary adenopathy. No  pericardial or pleural effusion is seen. Lung window images show the  lungs to appear clear except for a few granulomatous calcifications. The  bony structures appear to be intact.       Impression:       No evidence of metastatic disease or other active disease in  the chest.     ABDOMEN AND PELVIS CT SCAN WITH ORAL AND IV CONTRAST:  There is diffuse  fatty liver change. Calcified gallstones are seen in the otherwise  normal appearing gallbladder. No significant abnormalities are seen of  the spleen, pancreas, adrenal glands, or kidneys. No upper abdominal  ascites or acute inflammatory change is appreciated. No definite  adenopathy is seen. There is a single 7 mm right mid abdominal precaval  node which should be followed for stability. The bowel loops are normal  in caliber and normal in appearance.     Regarding the lower abdomen and pelvis, the uterus is premenopausal in  size, mildly enlarged to approximately 7.4 x 6.2 cm in axial diameters.  Delayed images appear to show most of the uterus occupied by a rounded 6  cm mass, heterogeneous in appearance consistent with the patient's known  neoplasm. The cervix appears grossly normal. The ovaries appear to be  atrophic. No definite pelvic sidewall adenopathy is seen. The included  inguinal lymph nodes are  relatively small, one of these appearing  rounded, 11 mm in diameter in the left inguinal region, which probably  should be followed for stability.     IMPRESSION:   1. Enlarged heterogeneous uterus, with evidence of underlying central  mass, presumably the patient's known neoplasm.  2. No findings particularly suspicious for metastatic disease or other  acute disease in the abdomen or pelvis.  3. Rounded 7 mm precaval node, and rounded 11 mm left inguinal lymph  node, not definitely abnormal, but which should be followed for  stability.  4. Cholelithiasis but no CT scan evidence of cholecystitis. Fatty liver  change noted.     D:  07/30/2019  E:  07/30/2019     This report was finalized on 7/31/2019 10:35 PM by DR. Quique Squires MD.             Results for orders placed during the hospital encounter of 07/28/19   Adult Transthoracic Echo Complete W/ Cont if Necessary Per Protocol    Narrative · Left ventricular systolic function is normal. Calculated EF = 69.0%.   Estimated EF appears to be in the range of 61 - 65%  · Left ventricular wall thickness is consistent with mild-to-moderate   concentric hypertrophy  · Left ventricular diastolic dysfunction is noted (grade I a w/high LAP)   consistent with impaired relaxation  · Moderate mitral annular calcification with trace mitral regurgitation.  · The aortic valve is abnormal in structure. There is moderate   calcification of the aortic valve. Mild aortic valve stenosis is present.   Mean gradient 12 mmHg          I have reviewed the medications:  Scheduled Meds:    [START ON 8/2/2019] acetaminophen 650 mg Oral Once   [START ON 8/2/2019] ceFAZolin (ANCEF) IVPB in 100 mL 2 g Intravenous Once   cholecalciferol 1,000 Units Oral Daily   ferrous sulfate 325 mg Oral Daily With Breakfast   lisinopril 30 mg Oral Nightly   medroxyPROGESTERone 30 mg Oral Daily   multivitamin with minerals 1 tablet Oral Daily   Pharmacy Meds to Bed Consult  Does not apply Daily   [START ON  8/2/2019] pregabalin 150 mg Oral Once   sodium chloride 3 mL Intravenous Q12H   vitamin E 100 Units Oral Daily     Continuous Infusions:    [START ON 8/2/2019] sodium chloride 75 mL/hr     PRN Meds:.[COMPLETED] Insert peripheral IV **AND** sodium chloride  •  sodium chloride      Assessment/Plan   Assessment / Plan     Active Hospital Problems    Diagnosis  POA   • **Endometrial cancer (CMS/HCC) [C54.1]  Unknown   • Microcytic anemia [D50.9]  Yes   • 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        Brief Hospital Course to date:  Hailey Osborn is a 68 y.o. female with recent history of vaginal bleeding for several months and diagnosis of endometrial cancer just couple of days ago.  Patient comes to the emergency room because of severe vaginal bleed and passing of large pieces of blood clot.  Has received packed red blood cell transfusion also progesterone.  Patient's hemoglobin hematocrit is now stable.  Gynecology oncology is on board and patient is supposed to have surgery on Friday by Dr. Aquino.  Patient also had echocardiogram done which shows diastolic dysfunction type I.    PLAN:  -cont current care and preps for surgery tomorrow.  -Labs in a.m.    DVT Prophylaxis:  scd    Disposition: TBD    CODE STATUS:   Code Status and Medical Interventions:   Ordered at: 07/28/19 1921     Level Of Support Discussed With:    Patient     Code Status:    CPR     Medical Interventions (Level of Support Prior to Arrest):    Full         Electronically signed by Best Quigley MD, 08/01/19, 4:10 PM.

## 2019-08-02 ENCOUNTER — ANESTHESIA EVENT (OUTPATIENT)
Dept: PERIOP | Facility: HOSPITAL | Age: 69
End: 2019-08-02

## 2019-08-02 ENCOUNTER — ANESTHESIA (OUTPATIENT)
Dept: PERIOP | Facility: HOSPITAL | Age: 69
End: 2019-08-02

## 2019-08-02 LAB
ABO GROUP BLD: NORMAL
BASOPHILS # BLD AUTO: 0.04 10*3/MM3 (ref 0–0.2)
BASOPHILS NFR BLD AUTO: 0.5 % (ref 0–1.5)
BLD GP AB SCN SERPL QL: NEGATIVE
DEPRECATED RDW RBC AUTO: 71.1 FL (ref 37–54)
EOSINOPHIL # BLD AUTO: 0.13 10*3/MM3 (ref 0–0.4)
EOSINOPHIL NFR BLD AUTO: 1.7 % (ref 0.3–6.2)
ERYTHROCYTE [DISTWIDTH] IN BLOOD BY AUTOMATED COUNT: 27.5 % (ref 12.3–15.4)
GLUCOSE BLDC GLUCOMTR-MCNC: 103 MG/DL (ref 70–130)
GLUCOSE BLDC GLUCOMTR-MCNC: 117 MG/DL (ref 70–130)
GLUCOSE BLDC GLUCOMTR-MCNC: 132 MG/DL (ref 70–130)
GLUCOSE BLDC GLUCOMTR-MCNC: 161 MG/DL (ref 70–130)
HCT VFR BLD AUTO: 27.9 % (ref 34–46.6)
HGB BLD-MCNC: 7.5 G/DL (ref 12–15.9)
IMM GRANULOCYTES # BLD AUTO: 0.03 10*3/MM3 (ref 0–0.05)
IMM GRANULOCYTES NFR BLD AUTO: 0.4 % (ref 0–0.5)
INR PPP: 1.17 (ref 0.85–1.16)
LYMPHOCYTES # BLD AUTO: 2.05 10*3/MM3 (ref 0.7–3.1)
LYMPHOCYTES NFR BLD AUTO: 27 % (ref 19.6–45.3)
MCH RBC QN AUTO: 19.8 PG (ref 26.6–33)
MCHC RBC AUTO-ENTMCNC: 26.9 G/DL (ref 31.5–35.7)
MCV RBC AUTO: 73.8 FL (ref 79–97)
MONOCYTES # BLD AUTO: 0.64 10*3/MM3 (ref 0.1–0.9)
MONOCYTES NFR BLD AUTO: 8.4 % (ref 5–12)
NEUTROPHILS # BLD AUTO: 4.71 10*3/MM3 (ref 1.7–7)
NEUTROPHILS NFR BLD AUTO: 62 % (ref 42.7–76)
NRBC BLD AUTO-RTO: 0 /100 WBC (ref 0–0.2)
PLATELET # BLD AUTO: 360 10*3/MM3 (ref 140–450)
PMV BLD AUTO: 8.5 FL (ref 6–12)
POTASSIUM BLD-SCNC: 4.5 MMOL/L (ref 3.5–5.2)
PROTHROMBIN TIME: 14.4 SECONDS (ref 11.2–14.3)
RBC # BLD AUTO: 3.78 10*6/MM3 (ref 3.77–5.28)
RH BLD: POSITIVE
T&S EXPIRATION DATE: NORMAL
WBC NRBC COR # BLD: 7.6 10*3/MM3 (ref 3.4–10.8)

## 2019-08-02 PROCEDURE — 85610 PROTHROMBIN TIME: CPT | Performed by: INTERNAL MEDICINE

## 2019-08-02 PROCEDURE — 82962 GLUCOSE BLOOD TEST: CPT

## 2019-08-02 PROCEDURE — 86850 RBC ANTIBODY SCREEN: CPT | Performed by: OBSTETRICS & GYNECOLOGY

## 2019-08-02 PROCEDURE — 85025 COMPLETE CBC W/AUTO DIFF WBC: CPT | Performed by: INTERNAL MEDICINE

## 2019-08-02 PROCEDURE — 0UT94ZZ RESECTION OF UTERUS, PERCUTANEOUS ENDOSCOPIC APPROACH: ICD-10-PCS | Performed by: OBSTETRICS & GYNECOLOGY

## 2019-08-02 PROCEDURE — 99232 SBSQ HOSP IP/OBS MODERATE 35: CPT | Performed by: INTERNAL MEDICINE

## 2019-08-02 PROCEDURE — 84132 ASSAY OF SERUM POTASSIUM: CPT | Performed by: ANESTHESIOLOGY

## 2019-08-02 PROCEDURE — 8E0W4CZ ROBOTIC ASSISTED PROCEDURE OF TRUNK REGION, PERCUTANEOUS ENDOSCOPIC APPROACH: ICD-10-PCS | Performed by: OBSTETRICS & GYNECOLOGY

## 2019-08-02 PROCEDURE — 38571 LAPAROSCOPY LYMPHADENECTOMY: CPT | Performed by: OBSTETRICS & GYNECOLOGY

## 2019-08-02 PROCEDURE — 25010000003 CEFAZOLIN PER 500 MG: Performed by: OBSTETRICS & GYNECOLOGY

## 2019-08-02 PROCEDURE — 25010000002 FENTANYL CITRATE (PF) 100 MCG/2ML SOLUTION: Performed by: NURSE ANESTHETIST, CERTIFIED REGISTERED

## 2019-08-02 PROCEDURE — P9041 ALBUMIN (HUMAN),5%, 50ML: HCPCS | Performed by: NURSE ANESTHETIST, CERTIFIED REGISTERED

## 2019-08-02 PROCEDURE — 07BC4ZX EXCISION OF PELVIS LYMPHATIC, PERCUTANEOUS ENDOSCOPIC APPROACH, DIAGNOSTIC: ICD-10-PCS | Performed by: OBSTETRICS & GYNECOLOGY

## 2019-08-02 PROCEDURE — 88307 TISSUE EXAM BY PATHOLOGIST: CPT | Performed by: OBSTETRICS & GYNECOLOGY

## 2019-08-02 PROCEDURE — 86901 BLOOD TYPING SEROLOGIC RH(D): CPT | Performed by: OBSTETRICS & GYNECOLOGY

## 2019-08-02 PROCEDURE — 86900 BLOOD TYPING SEROLOGIC ABO: CPT

## 2019-08-02 PROCEDURE — 0DBU4ZZ EXCISION OF OMENTUM, PERCUTANEOUS ENDOSCOPIC APPROACH: ICD-10-PCS | Performed by: OBSTETRICS & GYNECOLOGY

## 2019-08-02 PROCEDURE — 36430 TRANSFUSION BLD/BLD COMPNT: CPT

## 2019-08-02 PROCEDURE — 88309 TISSUE EXAM BY PATHOLOGIST: CPT | Performed by: OBSTETRICS & GYNECOLOGY

## 2019-08-02 PROCEDURE — 63710000001 INSULIN LISPRO (HUMAN) PER 5 UNITS: Performed by: INTERNAL MEDICINE

## 2019-08-02 PROCEDURE — 0UT74ZZ RESECTION OF BILATERAL FALLOPIAN TUBES, PERCUTANEOUS ENDOSCOPIC APPROACH: ICD-10-PCS | Performed by: OBSTETRICS & GYNECOLOGY

## 2019-08-02 PROCEDURE — 88305 TISSUE EXAM BY PATHOLOGIST: CPT | Performed by: OBSTETRICS & GYNECOLOGY

## 2019-08-02 PROCEDURE — 86900 BLOOD TYPING SEROLOGIC ABO: CPT | Performed by: OBSTETRICS & GYNECOLOGY

## 2019-08-02 PROCEDURE — 86923 COMPATIBILITY TEST ELECTRIC: CPT

## 2019-08-02 PROCEDURE — 0UT24ZZ RESECTION OF BILATERAL OVARIES, PERCUTANEOUS ENDOSCOPIC APPROACH: ICD-10-PCS | Performed by: OBSTETRICS & GYNECOLOGY

## 2019-08-02 PROCEDURE — 25010000002 ALBUMIN HUMAN 5% PER 50 ML: Performed by: NURSE ANESTHETIST, CERTIFIED REGISTERED

## 2019-08-02 PROCEDURE — P9016 RBC LEUKOCYTES REDUCED: HCPCS

## 2019-08-02 PROCEDURE — 25010000002 PROPOFOL 10 MG/ML EMULSION: Performed by: NURSE ANESTHETIST, CERTIFIED REGISTERED

## 2019-08-02 PROCEDURE — 58571 TLH W/T/O 250 G OR LESS: CPT | Performed by: OBSTETRICS & GYNECOLOGY

## 2019-08-02 RX ORDER — NEOSTIGMINE METHYLSULFATE 5 MG/5 ML
SYRINGE (ML) INTRAVENOUS AS NEEDED
Status: DISCONTINUED | OUTPATIENT
Start: 2019-08-02 | End: 2019-08-02 | Stop reason: SURG

## 2019-08-02 RX ORDER — ALBUMIN, HUMAN INJ 5% 5 %
SOLUTION INTRAVENOUS CONTINUOUS PRN
Status: DISCONTINUED | OUTPATIENT
Start: 2019-08-02 | End: 2019-08-02 | Stop reason: SURG

## 2019-08-02 RX ORDER — NALOXONE HCL 0.4 MG/ML
0.1 VIAL (ML) INJECTION
Status: DISCONTINUED | OUTPATIENT
Start: 2019-08-02 | End: 2019-08-03 | Stop reason: HOSPADM

## 2019-08-02 RX ORDER — PROMETHAZINE HYDROCHLORIDE 25 MG/ML
6.25 INJECTION, SOLUTION INTRAMUSCULAR; INTRAVENOUS ONCE AS NEEDED
Status: DISCONTINUED | OUTPATIENT
Start: 2019-08-02 | End: 2019-08-02 | Stop reason: HOSPADM

## 2019-08-02 RX ORDER — PROMETHAZINE HYDROCHLORIDE 25 MG/ML
12.5 INJECTION, SOLUTION INTRAMUSCULAR; INTRAVENOUS EVERY 6 HOURS PRN
Status: DISCONTINUED | OUTPATIENT
Start: 2019-08-02 | End: 2019-08-03 | Stop reason: HOSPADM

## 2019-08-02 RX ORDER — PROMETHAZINE HYDROCHLORIDE 12.5 MG/1
12.5 TABLET ORAL EVERY 6 HOURS PRN
Status: DISCONTINUED | OUTPATIENT
Start: 2019-08-02 | End: 2019-08-03 | Stop reason: HOSPADM

## 2019-08-02 RX ORDER — FAMOTIDINE 20 MG/1
20 TABLET, FILM COATED ORAL ONCE
Status: COMPLETED | OUTPATIENT
Start: 2019-08-02 | End: 2019-08-02

## 2019-08-02 RX ORDER — PROMETHAZINE HYDROCHLORIDE 25 MG/1
25 TABLET ORAL ONCE AS NEEDED
Status: DISCONTINUED | OUTPATIENT
Start: 2019-08-02 | End: 2019-08-02 | Stop reason: HOSPADM

## 2019-08-02 RX ORDER — GLYCOPYRROLATE 0.2 MG/ML
INJECTION INTRAMUSCULAR; INTRAVENOUS AS NEEDED
Status: DISCONTINUED | OUTPATIENT
Start: 2019-08-02 | End: 2019-08-02 | Stop reason: SURG

## 2019-08-02 RX ORDER — BUPIVACAINE HYDROCHLORIDE AND EPINEPHRINE 5; 5 MG/ML; UG/ML
INJECTION, SOLUTION PERINEURAL AS NEEDED
Status: DISCONTINUED | OUTPATIENT
Start: 2019-08-02 | End: 2019-08-02 | Stop reason: HOSPADM

## 2019-08-02 RX ORDER — FENTANYL CITRATE 50 UG/ML
INJECTION, SOLUTION INTRAMUSCULAR; INTRAVENOUS AS NEEDED
Status: DISCONTINUED | OUTPATIENT
Start: 2019-08-02 | End: 2019-08-02 | Stop reason: SURG

## 2019-08-02 RX ORDER — FENTANYL CITRATE 50 UG/ML
50 INJECTION, SOLUTION INTRAMUSCULAR; INTRAVENOUS
Status: DISCONTINUED | OUTPATIENT
Start: 2019-08-02 | End: 2019-08-02 | Stop reason: HOSPADM

## 2019-08-02 RX ORDER — SODIUM CHLORIDE 9 MG/ML
INJECTION, SOLUTION INTRAVENOUS AS NEEDED
Status: DISCONTINUED | OUTPATIENT
Start: 2019-08-02 | End: 2019-08-02 | Stop reason: HOSPADM

## 2019-08-02 RX ORDER — OXYCODONE HYDROCHLORIDE 5 MG/1
10 TABLET ORAL EVERY 4 HOURS PRN
Status: DISCONTINUED | OUTPATIENT
Start: 2019-08-02 | End: 2019-08-03 | Stop reason: HOSPADM

## 2019-08-02 RX ORDER — LIDOCAINE HYDROCHLORIDE 10 MG/ML
0.5 INJECTION, SOLUTION EPIDURAL; INFILTRATION; INTRACAUDAL; PERINEURAL ONCE AS NEEDED
Status: DISCONTINUED | OUTPATIENT
Start: 2019-08-02 | End: 2019-08-02 | Stop reason: HOSPADM

## 2019-08-02 RX ORDER — HYDROMORPHONE HYDROCHLORIDE 1 MG/ML
0.5 INJECTION, SOLUTION INTRAMUSCULAR; INTRAVENOUS; SUBCUTANEOUS
Status: DISCONTINUED | OUTPATIENT
Start: 2019-08-02 | End: 2019-08-03 | Stop reason: HOSPADM

## 2019-08-02 RX ORDER — LIDOCAINE HYDROCHLORIDE 20 MG/ML
INJECTION, SOLUTION INFILTRATION; PERINEURAL AS NEEDED
Status: DISCONTINUED | OUTPATIENT
Start: 2019-08-02 | End: 2019-08-02 | Stop reason: SURG

## 2019-08-02 RX ORDER — SODIUM CHLORIDE, SODIUM LACTATE, POTASSIUM CHLORIDE, CALCIUM CHLORIDE 600; 310; 30; 20 MG/100ML; MG/100ML; MG/100ML; MG/100ML
9 INJECTION, SOLUTION INTRAVENOUS CONTINUOUS
Status: DISCONTINUED | OUTPATIENT
Start: 2019-08-02 | End: 2019-08-02

## 2019-08-02 RX ORDER — DEXTROSE MONOHYDRATE 25 G/50ML
25 INJECTION, SOLUTION INTRAVENOUS
Status: DISCONTINUED | OUTPATIENT
Start: 2019-08-02 | End: 2019-08-03 | Stop reason: HOSPADM

## 2019-08-02 RX ORDER — ONDANSETRON 2 MG/ML
4 INJECTION INTRAMUSCULAR; INTRAVENOUS EVERY 6 HOURS PRN
Status: DISCONTINUED | OUTPATIENT
Start: 2019-08-02 | End: 2019-08-03 | Stop reason: HOSPADM

## 2019-08-02 RX ORDER — HYDROMORPHONE HYDROCHLORIDE 1 MG/ML
0.5 INJECTION, SOLUTION INTRAMUSCULAR; INTRAVENOUS; SUBCUTANEOUS
Status: DISCONTINUED | OUTPATIENT
Start: 2019-08-02 | End: 2019-08-02 | Stop reason: HOSPADM

## 2019-08-02 RX ORDER — NICOTINE POLACRILEX 4 MG
15 LOZENGE BUCCAL
Status: DISCONTINUED | OUTPATIENT
Start: 2019-08-02 | End: 2019-08-03 | Stop reason: HOSPADM

## 2019-08-02 RX ORDER — FAMOTIDINE 10 MG/ML
20 INJECTION, SOLUTION INTRAVENOUS ONCE
Status: CANCELLED | OUTPATIENT
Start: 2019-08-02 | End: 2019-08-02

## 2019-08-02 RX ORDER — SODIUM CHLORIDE 0.9 % (FLUSH) 0.9 %
3 SYRINGE (ML) INJECTION EVERY 12 HOURS SCHEDULED
Status: CANCELLED | OUTPATIENT
Start: 2019-08-02

## 2019-08-02 RX ORDER — SODIUM CHLORIDE 0.9 % (FLUSH) 0.9 %
3-10 SYRINGE (ML) INJECTION AS NEEDED
Status: CANCELLED | OUTPATIENT
Start: 2019-08-02

## 2019-08-02 RX ORDER — PROMETHAZINE HYDROCHLORIDE 12.5 MG/1
12.5 SUPPOSITORY RECTAL EVERY 6 HOURS PRN
Status: DISCONTINUED | OUTPATIENT
Start: 2019-08-02 | End: 2019-08-03 | Stop reason: HOSPADM

## 2019-08-02 RX ORDER — PROPOFOL 10 MG/ML
VIAL (ML) INTRAVENOUS AS NEEDED
Status: DISCONTINUED | OUTPATIENT
Start: 2019-08-02 | End: 2019-08-02 | Stop reason: SURG

## 2019-08-02 RX ORDER — PROMETHAZINE HYDROCHLORIDE 25 MG/1
25 SUPPOSITORY RECTAL ONCE AS NEEDED
Status: DISCONTINUED | OUTPATIENT
Start: 2019-08-02 | End: 2019-08-02 | Stop reason: HOSPADM

## 2019-08-02 RX ORDER — MAGNESIUM HYDROXIDE 1200 MG/15ML
LIQUID ORAL AS NEEDED
Status: DISCONTINUED | OUTPATIENT
Start: 2019-08-02 | End: 2019-08-03 | Stop reason: HOSPADM

## 2019-08-02 RX ORDER — ACETAMINOPHEN 325 MG/1
650 TABLET ORAL EVERY 6 HOURS SCHEDULED
Status: DISCONTINUED | OUTPATIENT
Start: 2019-08-02 | End: 2019-08-03 | Stop reason: HOSPADM

## 2019-08-02 RX ORDER — ATRACURIUM BESYLATE 10 MG/ML
INJECTION, SOLUTION INTRAVENOUS AS NEEDED
Status: DISCONTINUED | OUTPATIENT
Start: 2019-08-02 | End: 2019-08-02 | Stop reason: SURG

## 2019-08-02 RX ORDER — OXYCODONE HYDROCHLORIDE 5 MG/1
5 TABLET ORAL EVERY 4 HOURS PRN
Status: DISCONTINUED | OUTPATIENT
Start: 2019-08-02 | End: 2019-08-03 | Stop reason: HOSPADM

## 2019-08-02 RX ORDER — IBUPROFEN 600 MG/1
600 TABLET ORAL EVERY 6 HOURS PRN
Status: DISCONTINUED | OUTPATIENT
Start: 2019-08-02 | End: 2019-08-03 | Stop reason: HOSPADM

## 2019-08-02 RX ORDER — SODIUM CHLORIDE, SODIUM LACTATE, POTASSIUM CHLORIDE, CALCIUM CHLORIDE 600; 310; 30; 20 MG/100ML; MG/100ML; MG/100ML; MG/100ML
75 INJECTION, SOLUTION INTRAVENOUS CONTINUOUS
Status: DISCONTINUED | OUTPATIENT
Start: 2019-08-02 | End: 2019-08-03 | Stop reason: HOSPADM

## 2019-08-02 RX ORDER — SIMETHICONE 80 MG
80 TABLET,CHEWABLE ORAL 4 TIMES DAILY PRN
Status: DISCONTINUED | OUTPATIENT
Start: 2019-08-02 | End: 2019-08-03 | Stop reason: HOSPADM

## 2019-08-02 RX ORDER — ONDANSETRON 4 MG/1
4 TABLET, FILM COATED ORAL EVERY 6 HOURS PRN
Status: DISCONTINUED | OUTPATIENT
Start: 2019-08-02 | End: 2019-08-03 | Stop reason: HOSPADM

## 2019-08-02 RX ADMIN — ATRACURIUM BESYLATE 50 MG: 10 INJECTION, SOLUTION INTRAVENOUS at 14:25

## 2019-08-02 RX ADMIN — INSULIN LISPRO 2 UNITS: 100 INJECTION, SOLUTION INTRAVENOUS; SUBCUTANEOUS at 20:11

## 2019-08-02 RX ADMIN — FENTANYL CITRATE 50 MCG: 50 INJECTION, SOLUTION INTRAMUSCULAR; INTRAVENOUS at 14:25

## 2019-08-02 RX ADMIN — FENTANYL CITRATE 50 MCG: 0.05 INJECTION, SOLUTION INTRAMUSCULAR; INTRAVENOUS at 17:56

## 2019-08-02 RX ADMIN — SODIUM CHLORIDE, POTASSIUM CHLORIDE, SODIUM LACTATE AND CALCIUM CHLORIDE 75 ML/HR: 600; 310; 30; 20 INJECTION, SOLUTION INTRAVENOUS at 20:01

## 2019-08-02 RX ADMIN — ACETAMINOPHEN 650 MG: 325 TABLET ORAL at 13:10

## 2019-08-02 RX ADMIN — PROPOFOL 30 MG: 10 INJECTION, EMULSION INTRAVENOUS at 14:28

## 2019-08-02 RX ADMIN — PREGABALIN 150 MG: 75 CAPSULE ORAL at 13:09

## 2019-08-02 RX ADMIN — ALBUMIN HUMAN: 0.05 INJECTION, SOLUTION INTRAVENOUS at 14:36

## 2019-08-02 RX ADMIN — GLYCOPYRROLATE 0.4 MG: 0.2 INJECTION, SOLUTION INTRAMUSCULAR; INTRAVENOUS at 17:04

## 2019-08-02 RX ADMIN — ACETAMINOPHEN 650 MG: 325 TABLET ORAL at 23:41

## 2019-08-02 RX ADMIN — SODIUM CHLORIDE, POTASSIUM CHLORIDE, SODIUM LACTATE AND CALCIUM CHLORIDE 9 ML/HR: 600; 310; 30; 20 INJECTION, SOLUTION INTRAVENOUS at 13:48

## 2019-08-02 RX ADMIN — CEFAZOLIN SODIUM 2 G: 1 INJECTION, POWDER, FOR SOLUTION INTRAMUSCULAR; INTRAVENOUS at 14:17

## 2019-08-02 RX ADMIN — ATRACURIUM BESYLATE 20 MG: 10 INJECTION, SOLUTION INTRAVENOUS at 16:40

## 2019-08-02 RX ADMIN — Medication 4 MG: at 17:04

## 2019-08-02 RX ADMIN — VITAMIN D, TAB 1000IU (100/BT) 1000 UNITS: 25 TAB at 08:29

## 2019-08-02 RX ADMIN — MULTIPLE VITAMINS W/ MINERALS TAB 1 TABLET: TAB ORAL at 08:29

## 2019-08-02 RX ADMIN — FENTANYL CITRATE 50 MCG: 50 INJECTION, SOLUTION INTRAMUSCULAR; INTRAVENOUS at 16:55

## 2019-08-02 RX ADMIN — VITAMIN E CAP 100 UNIT 100 UNITS: 100 CAP at 08:29

## 2019-08-02 RX ADMIN — ACETAMINOPHEN 650 MG: 325 TABLET ORAL at 20:01

## 2019-08-02 RX ADMIN — MEDROXYPROGESTERONE ACETATE 30 MG: 10 TABLET ORAL at 08:29

## 2019-08-02 RX ADMIN — SODIUM CHLORIDE, PRESERVATIVE FREE 3 ML: 5 INJECTION INTRAVENOUS at 08:30

## 2019-08-02 RX ADMIN — LIDOCAINE HYDROCHLORIDE 50 MG: 20 INJECTION, SOLUTION INFILTRATION; PERINEURAL at 14:25

## 2019-08-02 RX ADMIN — SODIUM CHLORIDE 75 ML/HR: 9 INJECTION, SOLUTION INTRAVENOUS at 00:40

## 2019-08-02 RX ADMIN — FAMOTIDINE 20 MG: 20 TABLET ORAL at 13:51

## 2019-08-02 RX ADMIN — FERROUS SULFATE TAB 325 MG (65 MG ELEMENTAL FE) 325 MG: 325 (65 FE) TAB at 08:29

## 2019-08-02 RX ADMIN — ALBUMIN HUMAN: 0.05 INJECTION, SOLUTION INTRAVENOUS at 14:58

## 2019-08-02 RX ADMIN — PROPOFOL 120 MG: 10 INJECTION, EMULSION INTRAVENOUS at 14:25

## 2019-08-02 RX ADMIN — SODIUM CHLORIDE, POTASSIUM CHLORIDE, SODIUM LACTATE AND CALCIUM CHLORIDE: 600; 310; 30; 20 INJECTION, SOLUTION INTRAVENOUS at 16:35

## 2019-08-02 NOTE — PROGRESS NOTES
Continued Stay Note   Jami     Patient Name: Hailey Osborn  MRN: [id number]  Today's Date: 8/2/2019    Admit Date: 7/28/2019    Discharge Plan     Row Name 08/02/19 1316       Plan    Plan  update    Patient/Family in Agreement with Plan  yes    Plan Comments  Patient has gone for procedure today.  CM will follow up.  Patient plan was to return home with her  via car with family to transport.      Final Discharge Disposition Code  01 - home or self-care        Discharge Codes    No documentation.       Expected Discharge Date and Time     Expected Discharge Date Expected Discharge Time    Aug 2, 2019             Kelli Alvarez, RN

## 2019-08-02 NOTE — ANESTHESIA PREPROCEDURE EVALUATION
Anesthesia Evaluation     Patient summary reviewed and Nursing notes reviewed   NPO Solid Status: > 8 hours  NPO Liquid Status: > 4 hours           Airway   Mallampati: I  TM distance: >3 FB  Neck ROM: full  No difficulty expected  Dental      Pulmonary     breath sounds clear to auscultation    ROS comment: No lung problems. Never smoker  Cardiovascular   Exercise tolerance: good (4-7 METS)    Rhythm: regular  Rate: normal    (+) hypertension, valvular problems/murmurs, murmur, hyperlipidemia,     ROS comment: ECHO reviewed. AV is calcified with mild stenosis.     Neuro/Psych  GI/Hepatic/Renal/Endo      Musculoskeletal     Abdominal   (+) obese,    Substance History      OB/GYN          Other      history of cancer                    Anesthesia Plan    ASA 3     general   (To receive 1unit PRBCs now for low Hgb.)  intravenous induction   Anesthetic plan, all risks, benefits, and alternatives have been provided, discussed and informed consent has been obtained with: patient.  Use of blood products discussed with patient .   Plan discussed with CRNA.

## 2019-08-02 NOTE — PLAN OF CARE
Problem: Patient Care Overview  Goal: Plan of Care Review  Outcome: Ongoing (interventions implemented as appropriate)   08/02/19 0417   Coping/Psychosocial   Plan of Care Reviewed With patient   Plan of Care Review   Progress improving   OTHER   Outcome Summary Pt reports that vaginal bleeding has slowed down a lot and is very minimal now. Vital signs stable. Plan for hysterectomy, omenectomy, etc. today with Dr. Aquino. Consent signed, NPO since midnight, CHGx1 done (unable to get any hibiclens, called supplies, pharm, and CD).  at bedside. Will continue to monitor.        Problem: Anemia (Adult)  Goal: Identify Related Risk Factors and Signs and Symptoms  Outcome: Ongoing (interventions implemented as appropriate)   08/02/19 0417   Anemia (Adult)   Related Risk Factors (Anemia) bleeding;chronic illness     Goal: Symptom Improvement  Outcome: Ongoing (interventions implemented as appropriate)   08/02/19 0417   Anemia (Adult)   Symptom Improvement making progress toward outcome

## 2019-08-02 NOTE — ANESTHESIA PROCEDURE NOTES
Airway  Urgency: elective    Airway not difficult    General Information and Staff    Patient location during procedure: OR  CRNA: Chasity Crawford CRNA    Indications and Patient Condition  Indications for airway management: airway protection    Preoxygenated: yes  MILS not maintained throughout  Mask difficulty assessment: 1 - vent by mask    Final Airway Details  Final airway type: endotracheal airway      Successful airway: ETT  Cuffed: yes   Successful intubation technique: direct laryngoscopy  Endotracheal tube insertion site: oral  Blade: Rose  Blade size: 3  ETT size (mm): 7.0  Cormack-Lehane Classification: grade I - full view of glottis  Placement verified by: chest auscultation and capnometry   Cuff volume (mL): 5  Measured from: lips  ETT to lips (cm): 20  Number of attempts at approach: 1    Additional Comments  Negative epigastric sounds, Breath sound equal bilaterally with symmetric chest rise and fall

## 2019-08-02 NOTE — PROGRESS NOTES
[facility] Medicine Services  PROGRESS NOTE    Patient Name: Hailey Osborn  : 1950  MRN: [id number]    Date of Admission: 2019  Length of Stay: 5  Primary Care Physician: Taylor Jones APRN    Subjective   Subjective     CC:  Vaginal bleeding.    HPI:  Resting in bed in no acute distress and overall comfortable.  Tells me that vaginal bleeding has  slowed very significantly down to just spotting..  Denies any fever or chills.  No chest pain, palpitation, shortness of breath at rest.  No nausea, vomiting, diarrhea, abdominal pain. NPO for surgery today.     Review of Systems      Otherwise ROS is negative except as mentioned in the HPI.    Objective   Objective     Vital Signs:   Temp:  [98 °F (36.7 °C)-98.6 °F (37 °C)] 98.6 °F (37 °C)  Heart Rate:  [69-87] 87  Resp:  [16-18] 18  BP: (116-160)/(61-83) 160/83        Physical Exam:  Constitutional: Awake, alert  Eyes: PERRLA, sclerae anicteric, no conjunctival injection  HENT: NCAT, mucous membranes moist  Neck: Supple, no thyromegaly, no lymphadenopathy, trachea midline  Respiratory: Clear to auscultation bilaterally, nonlabored respirations   Cardiovascular: RRR, systolic apical murmurs, rubs, or gallops.  Gastrointestinal: Positive bowel sounds, soft, nontender, nondistended  Musculoskeletal: No bilateral ankle edema, no clubbing or cyanosis to extremities  Psychiatric: Appropriate affect, cooperative  Neurologic: Oriented x 3, strength symmetric in all extremities, Cranial Nerves grossly intact to confrontation, speech clear  Skin: No rashes    Results Reviewed:  I have personally reviewed current lab, radiology, and data and agree.    Results from last 7 days   Lab Units 19  0527 19  0825 19  0056  19  0444 19  2119 19  1650   WBC 10*3/mm3 7.60  --   --   --  6.40  --  10.77   HEMOGLOBIN g/dL 7.5* 9.0* 8.8*   < > 7.7*  7.7*  --  6.5*   HEMATOCRIT % 27.9* 32.0* 31.6*   < > 27.7*   27.7*  --  25.3*   PLATELETS 10*3/mm3 360  --   --   --  402  --  540*   INR  1.17*  --   --   --   --  1.18*  --     < > = values in this interval not displayed.     Results from last 7 days   Lab Units 08/02/19  0700 07/29/19  0444 07/28/19  1650   SODIUM mmol/L  --  137 133*   POTASSIUM mmol/L 4.5 3.9 3.6   CHLORIDE mmol/L  --  101 92*   CO2 mmol/L  --  26.0 24.0   BUN mg/dL  --  6* 7*   CREATININE mg/dL  --  0.63 0.77   GLUCOSE mg/dL  --  131* 132*   CALCIUM mg/dL  --  9.3 9.6   ALT (SGPT) U/L  --  8  --    AST (SGOT) U/L  --  12  --      Estimated Creatinine Clearance: 84.7 mL/min (by C-G formula based on SCr of 0.63 mg/dL).    Microbiology Results Abnormal     None          Imaging Results (last 24 hours)     ** No results found for the last 24 hours. **          Results for orders placed during the hospital encounter of 07/28/19   Adult Transthoracic Echo Complete W/ Cont if Necessary Per Protocol    Narrative · Left ventricular systolic function is normal. Calculated EF = 69.0%.   Estimated EF appears to be in the range of 61 - 65%  · Left ventricular wall thickness is consistent with mild-to-moderate   concentric hypertrophy  · Left ventricular diastolic dysfunction is noted (grade I a w/high LAP)   consistent with impaired relaxation  · Moderate mitral annular calcification with trace mitral regurgitation.  · The aortic valve is abnormal in structure. There is moderate   calcification of the aortic valve. Mild aortic valve stenosis is present.   Mean gradient 12 mmHg          I have reviewed the medications:  Scheduled Meds:    [MAR Hold] ceFAZolin (ANCEF) IVPB in 100 mL 2 g Intravenous Once   [MAR Hold] cholecalciferol 1,000 Units Oral Daily   famotidine 20 mg Oral Once   [MAR Hold] ferrous sulfate 325 mg Oral Daily With Breakfast   [MAR Hold] insulin lispro 0-7 Units Subcutaneous 4x Daily With Meals & Nightly   lisinopril 30 mg Oral Nightly   [MAR Hold] medroxyPROGESTERone 30 mg Oral Daily   [MAR Hold]  multivitamin with minerals 1 tablet Oral Daily   [MAR Hold] Pharmacy Meds to Bed Consult  Does not apply Daily   [MAR Hold] sodium chloride 3 mL Intravenous Q12H   [MAR Hold] vitamin E 100 Units Oral Daily     Continuous Infusions:    lactated ringers 9 mL/hr    sodium chloride 75 mL/hr Last Rate: 75 mL/hr (08/02/19 0040)     PRN Meds:.[MAR Hold] dextrose  •  [MAR Hold] dextrose  •  [MAR Hold] glucagon (human recombinant)  •  lidocaine PF 1%  •  [COMPLETED] Insert peripheral IV **AND** [MAR Hold] sodium chloride  •  [MAR Hold] sodium chloride      Assessment/Plan   Assessment / Plan     Active Hospital Problems    Diagnosis  POA   • **Endometrial cancer (CMS/HCC) [C54.1]  Unknown   • Microcytic anemia [D50.9]  Yes   • 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        Brief Hospital Course to date:  Hailey Osborn is a 68 y.o. female with past medical history significant for hypertension, hyperlipidemia, diabetes mellitus but patient not on any medication, with recent history of vaginal bleeding for several months and diagnosis of endometrial cancer just a few  days ago.  Patient comes to the emergency room because of severe vaginal bleed and passing of large pieces of blood clot.  Has received packed red blood cell transfusion also progesterone.  Patient's hemoglobin hematocrit is now stable.  Gynecology oncology is on board and patient is supposed to have surgery on Friday by Dr. Aquino.  Patient also had echocardiogram done which shows diastolic dysfunction type I.    Assessment:  -Endometrial cancer and severe vaginal bleed.  Patient has had vaginal bleed for several  months.    -Diabetes mellitus with hemoglobins A1c of 7.2.  Patient tells me that she used to take metformin and possibly other medications for it however prior to this admission she was not on any medication.    -Hypertension    -Hyperlipidemia    -Diastolic dysfunction found on echocardiogram.    -We will obesity.    PLAN:  -cont current care   - monitor BS closely after surgery  - monitor H/H   - surgery today BY DR. Aquino   -Labs in a.m.    DVT Prophylaxis:  scd    Disposition: TBD    CODE STATUS:   Code Status and Medical Interventions:   Ordered at: 07/28/19 1921     Level Of Support Discussed With:    Patient     Code Status:    CPR     Medical Interventions (Level of Support Prior to Arrest):    Full         Electronically signed by Best Quigley MD, 08/02/19, 1:47 PM.

## 2019-08-02 NOTE — OP NOTE
Subjective     Date of Service:  08/02/19  Time of Service:  5:13 PM    Surgical Staff: Surgeon(s) and Role:     * Lolis Aquino MD - Primary     * Amanda Bauer MD - Resident - Assisting   Additional Staff:    Pre-operative diagnosis(es): Pre-Op Diagnosis Codes:     * Endometrial cancer (CMS/[facility]) [C54.1]  Clinical stage I (TINOSNOMO) serous adenocarcinoma  Vaginal hemorrhage     Post-operative diagnosis(es): Post-Op Diagnosis Codes:     Same     Procedure(s): Procedure(s):  radical TOTAL LAPAROSCOPIC HYSTERECTOMY BILATERAL SALPINGECTOMY WITH DAVINCI ROBOT, OMENTECTOMY, BILATERAL PELVIC NODE DISSECTION LAPAROSCOPIC WITH DAVINCI ROBOT     Antibiotics: cefazolin (Ancef) ordered on call to OR     Anesthesia: Type: General  ASA:  III     Objective      Operative findings:  At the time of laparoscopy, there is an enlarged uterus.  Uterus was too large to be removed via the colpotomy and was placed within a bag.  Within the bag uterus was largely replaced by necrotic tumor.  There is no ascites or carcinomatosis noted.  There were no till metastasis.  Body habitus precluded periaortic lymph node dissection.     Specimens removed: ID Type Source Tests Collected by Time   A :  Tissue Uterus with Cervix, Bilateral Tubes and Ovaries TISSUE PATHOLOGY EXAM Lolis Aquino MD 8/2/2019 1558   B :  Tissue Omentum TISSUE PATHOLOGY EXAM Lolis Aquino MD 8/2/2019 1601   C : right pelvic lymph node Tissue Lymph Node TISSUE PATHOLOGY EXAM Lolis Aquino MD 8/2/2019 1609   D : left pelvic lymph node Tissue Lymph Node TISSUE PATHOLOGY EXAM Lolis Aquino MD 8/2/2019 1616      Fluid Intake:    Output:     I/O this shift:  In: 1200 [Blood:700; IV Piggyback:500]  Out: -      Blood products used: Yes   Drains: Urethral Catheter Non-latex 16 Fr. (Active)      Implant Information: Nothing was implanted during the procedure   Complications: No immediate   Intraoperative consult(s):    Condition: stable    Disposition: to PACU and then admit to  medical - surgical floor       Indications:    Patient is a pleasant 68-year-old woman with a 1 year history of postmenopausal bleeding.  She was diagnosed with a serous endometrial cancer on biopsy.  CT scan was negative for obvious metastatic disease although there were marginally enlarged lymph nodes.  Risks and benefits of surgery were discussed.  Consent was signed and on chart.    Procedure:   After obtaining informed consent, the patient was  taken to the operating room and underwent general endotracheal anesthesia after  patient and site verification. The feet were placed in Michael stirrups. The arms were tucked at the sides. Steep Trendelenburg  positioning was tested and found to be adequate. The abdomen, perineum, and  vagina were prepped and draped in the usual sterile fashion. Hansen catheter was  anchored. Retractors were used to visualize the cervix. Cervix was sounded and the appropriate uterine manipulator was called for.  Uterine manipulator was secured with out difficulty.  Attention was turned to the abdomen. Prior  to each incision, skin was injected with 0.5% Marcaine with epinephrine. A 12  mm trocar was inserted at the supra-umbilical midline position in the open  technique without difficulty. Laparoscope was introduced to confirm  positioning. Steep Trendelenburg was called for. The abdomen was insufflated to  a pressure of 15 mmHg with CO2 gas.  2 left-sided 8mm, 1 right-sided 8 mm, and 1 right-sided 12 mm trochars were all placed under direct visualization.  The above findings were noted.  Da Gary robot was docked to the patient and surgeon retired to the operating console.    Adhesions of the omentum to the anterior abdominal wall were noted.  This facilitated infracolic omentectomy.  The peritoneum overlying the pelvic sidewalls was divided with monopolar scissors.  Infundibulopelvic ligaments were isolated from surrounding structures  and  pedicles were created using bipolar cautery and scissors. Likewise, the round  ligaments were divided. Anterior and posterior leaves of the broad ligament  were divided with monopolar scissors. A bladder  flap was developed.  Uterine vessels were isolated. Pedicles were created at the level of the uterine vessels using bipolar cautery and scissors. Cardinal ligament and uterosacral ligament complexes were divided in a similar fashion. Monopolar scissors were used to perform a circumferential colpotomy.  Due to the large size of the specimen, it was placed within a bag and removed in a piecemeal fashion by the vagina without any spillage identified.  A great deal of necrotic tumor was noted.    Bilateral pelvic lymph node dissection was performed  according to the usual anatomic landmarks including the aorta and its  bifurcation, the common iliac arteries and their bifurcations, the external and  internal iliac arteries, the obturator and genitofemoral nerves, and the  umbilical ligament. Pelvic lymph nodes were handed off via the vagina and the  remainder of specimens were handed off via the assistant port. Prior to completion of the surgery, a total of 10mL of FloSeal was placed in areas of lymph node dissection.    The vaginal cuff was closed with 0 Vicryl suture in a running locking fashion x2 layers. Good hemostasis was noted. Additional hemostasis was achieved at the pelvis as needed using bipolar cautery. Da Gary robot  was undocked from the patient and the surgeon returned to the bedside.  Trochars were removed under direct visualization.  CO2 gas was allowed to escape from the abdominal cavity.  0 Vicryl suture was used to reapproximate the fascia at the supra-umbilical midline incision. At that point, no fascial defects could be palpated.  The skin was closed with 3-0Monocryl in subcuticular stitch and glue was placed at the skin. The vagina was inspected.  Occluder and all instruments had been  removed from the vagina.  Excellent hemostasis was noted.  There is an abrasion at the posterior fourchette there was not bleeding.  The patient was taken to the recovery room in good condition. There were no immediate complications. All counts were correct.      Lolis Aquino MD  08/02/19  5:13 PM

## 2019-08-02 NOTE — PLAN OF CARE
Problem: Patient Care Overview  Goal: Plan of Care Review  Outcome: Ongoing (interventions implemented as appropriate)   08/02/19 0856   Coping/Psychosocial   Plan of Care Reviewed With patient   Plan of Care Review   Progress no change   OTHER   Outcome Summary Vaginal bleeding continues, plan for hysterectomy today. Will continue to monitor.      Goal: Individualization and Mutuality  Outcome: Ongoing (interventions implemented as appropriate)    Goal: Discharge Needs Assessment  Outcome: Ongoing (interventions implemented as appropriate)    Goal: Interprofessional Rounds/Family Conf  Outcome: Ongoing (interventions implemented as appropriate)      Problem: Anemia (Adult)  Goal: Identify Related Risk Factors and Signs and Symptoms  Outcome: Ongoing (interventions implemented as appropriate)    Goal: Symptom Improvement  Outcome: Ongoing (interventions implemented as appropriate)      Problem: Surgery Nonspecified (Adult)  Goal: Signs and Symptoms of Listed Potential Problems Will be Absent, Minimized or Managed (Surgery Nonspecified)  Outcome: Ongoing (interventions implemented as appropriate)    Goal: Anesthesia/Sedation Recovery  Outcome: Ongoing (interventions implemented as appropriate)

## 2019-08-02 NOTE — ANESTHESIA POSTPROCEDURE EVALUATION
Patient: Hailey Osborn    Procedure Summary     Date:  08/02/19 Room / Location:   [facility]  [facility]    Anesthesia Start:  1417 Anesthesia Stop:  1729    Procedures:       radical TOTAL LAPAROSCOPIC HYSTERECTOMY BILATERAL SALPINGECTOMY WITH DAVINCI ROBOT, omentumectomy (N/A Abdomen)      bilateral pelvic lymph NODE DISSECTION LAPAROSCOPIC WITH DAVINCI ROBOT (N/A Abdomen) Diagnosis:       Endometrial cancer (CMS/HCC)      (Endometrial cancer (CMS/HCC) [C54.1])    Surgeon:  Lolis Aquino MD Provider:  Arben Smallwood MD    Anesthesia Type:  general ASA Status:  3          Anesthesia Type: general  Last vitals  BP   152/71   Temp   97.2   Pulse   95   Resp   18     SpO2   95     Post Anesthesia Care and Evaluation    Patient location during evaluation: PACU  Patient participation: complete - patient participated  Level of consciousness: awake and alert  Pain score: 0  Pain management: adequate  Airway patency: patent  Anesthetic complications: No anesthetic complications  PONV Status: none  Cardiovascular status: hemodynamically stable and acceptable  Respiratory status: nonlabored ventilation, acceptable and nasal cannula  Hydration status: acceptable

## 2019-08-02 NOTE — PROGRESS NOTES
Gynecologic Oncology   Daily Progress Note    Subjective   Patient did well overnight.  She is just having spotting now.  Her pain is controlled.  She is not having nausea or emesis.  She has been NPO. She is voiding spontaneously and is passing gas.  She is ambulating.  She is using her incentive spirometer.  Denies fever, chills, chest pain, shortness of air.      Objective   Temp:  [97.4 °F (36.3 °C)-98.5 °F (36.9 °C)] 98.4 °F (36.9 °C)  Heart Rate:  [69-77] 76  Resp:  [17-18] 18  BP: (116-146)/(59-78) 129/61  Vitals:    08/02/19 0655   BP: 129/61   Pulse: 76   Resp: 18   Temp: 98.4 °F (36.9 °C)   SpO2: (!) 89%     No intake/output data recorded.     GENERAL: Alert, well-appearing female in no apparent distress.    CARDIOVASCULAR: Normal rate, regular rhythm, no murmurs, rubs, or gallops.    RESPIRATORY: Clear to auscultation bilaterally, normal respiratory effort  GASTROINTESTINAL:  Soft, appropriately tender, non-distended, no rebound or guarding.  Positive bowel sounds.   SKIN:  Warm, dry, well-perfused.    PSYCHIATRIC: AO x3, with appropriate affect, normal thought processes  EXREMITIES: Symmetric. No peripheral edema.    Lab Results   Component Value Date    WBC 7.60 08/02/2019    HGB 7.5 (L) 08/02/2019    HCT 27.9 (L) 08/02/2019    MCV 73.8 (L) 08/02/2019     08/02/2019    NEUTROABS 4.71 08/02/2019    GLUCOSE 131 (H) 07/29/2019    BUN 6 (L) 07/29/2019    CREATININE 0.63 07/29/2019    EGFRIFNONA 94 07/29/2019     07/29/2019    K 3.9 07/29/2019     07/29/2019    CO2 26.0 07/29/2019    CALCIUM 9.3 07/29/2019         Assessment/Plan   This is a 68 y.o. woman with a recent diagnosis of high-grade papillary serous endometrial cancer and vaginal bleeding causing anemia.     - CT scan 7/29/19.  There are some marginal enlarged lymph nodes.  Uterus is prominent.  There is no carcinomatosis or ascites noted.  Findings were overall consistent with patient's known cancer.  There is cholelithiasis  -  plan for OR today Friday August 2 for TLH, BSO, LND, omentectomy  -medroxyprogesterone acetate 30 mg q day   - normal   -Patient's vaginal bleeding has improved  -continue to monitor hematocrit, s/p total of 3 units PRBC  -NPO with IVF since midnight           Amanda Bauer MD  08/02/19  7:26 AM    I saw and evaluated the patient. I agree with the findings and the plan of care as documented in the note.    Transfuse 2 u PRBC    To OR - all questions answered.    Lolis Aquino MD  08/02/19  1:49 PM

## 2019-08-03 VITALS
HEIGHT: 69 IN | TEMPERATURE: 98.3 F | WEIGHT: 220.2 LBS | RESPIRATION RATE: 16 BRPM | SYSTOLIC BLOOD PRESSURE: 166 MMHG | DIASTOLIC BLOOD PRESSURE: 85 MMHG | HEART RATE: 74 BPM | OXYGEN SATURATION: 98 % | BODY MASS INDEX: 32.61 KG/M2

## 2019-08-03 LAB
ABO + RH BLD: NORMAL
ABO + RH BLD: NORMAL
ANION GAP SERPL CALCULATED.3IONS-SCNC: 14 MMOL/L (ref 5–15)
BASOPHILS # BLD AUTO: 0.01 10*3/MM3 (ref 0–0.2)
BASOPHILS NFR BLD AUTO: 0.1 % (ref 0–1.5)
BH BB BLOOD EXPIRATION DATE: NORMAL
BH BB BLOOD EXPIRATION DATE: NORMAL
BH BB BLOOD TYPE BARCODE: 6200
BH BB BLOOD TYPE BARCODE: 6200
BH BB DISPENSE STATUS: NORMAL
BH BB DISPENSE STATUS: NORMAL
BH BB PRODUCT CODE: NORMAL
BH BB PRODUCT CODE: NORMAL
BH BB UNIT NUMBER: NORMAL
BH BB UNIT NUMBER: NORMAL
BUN BLD-MCNC: 6 MG/DL (ref 8–23)
BUN/CREAT SERPL: 10 (ref 7–25)
CALCIUM SPEC-SCNC: 9 MG/DL (ref 8.6–10.5)
CHLORIDE SERPL-SCNC: 99 MMOL/L (ref 98–107)
CO2 SERPL-SCNC: 23 MMOL/L (ref 22–29)
CREAT BLD-MCNC: 0.6 MG/DL (ref 0.57–1)
DEPRECATED RDW RBC AUTO: 67.9 FL (ref 37–54)
EOSINOPHIL # BLD AUTO: 0 10*3/MM3 (ref 0–0.4)
EOSINOPHIL NFR BLD AUTO: 0 % (ref 0.3–6.2)
ERYTHROCYTE [DISTWIDTH] IN BLOOD BY AUTOMATED COUNT: 26.1 % (ref 12.3–15.4)
GFR SERPL CREATININE-BSD FRML MDRD: 99 ML/MIN/1.73
GLUCOSE BLD-MCNC: 148 MG/DL (ref 65–99)
GLUCOSE BLDC GLUCOMTR-MCNC: 145 MG/DL (ref 70–130)
GLUCOSE BLDC GLUCOMTR-MCNC: 154 MG/DL (ref 70–130)
HCT VFR BLD AUTO: 35 % (ref 34–46.6)
HGB BLD-MCNC: 10 G/DL (ref 12–15.9)
IMM GRANULOCYTES # BLD AUTO: 0.04 10*3/MM3 (ref 0–0.05)
IMM GRANULOCYTES NFR BLD AUTO: 0.4 % (ref 0–0.5)
LYMPHOCYTES # BLD AUTO: 1.18 10*3/MM3 (ref 0.7–3.1)
LYMPHOCYTES NFR BLD AUTO: 10.7 % (ref 19.6–45.3)
MCH RBC QN AUTO: 20.9 PG (ref 26.6–33)
MCHC RBC AUTO-ENTMCNC: 28.6 G/DL (ref 31.5–35.7)
MCV RBC AUTO: 73.2 FL (ref 79–97)
MONOCYTES # BLD AUTO: 0.43 10*3/MM3 (ref 0.1–0.9)
MONOCYTES NFR BLD AUTO: 3.9 % (ref 5–12)
NEUTROPHILS # BLD AUTO: 9.4 10*3/MM3 (ref 1.7–7)
NEUTROPHILS NFR BLD AUTO: 84.9 % (ref 42.7–76)
NRBC BLD AUTO-RTO: 0 /100 WBC (ref 0–0.2)
PLATELET # BLD AUTO: 432 10*3/MM3 (ref 140–450)
PMV BLD AUTO: 8.7 FL (ref 6–12)
POTASSIUM BLD-SCNC: 4.4 MMOL/L (ref 3.5–5.2)
RBC # BLD AUTO: 4.78 10*6/MM3 (ref 3.77–5.28)
SODIUM BLD-SCNC: 136 MMOL/L (ref 136–145)
UNIT  ABO: NORMAL
UNIT  ABO: NORMAL
UNIT  RH: NORMAL
UNIT  RH: NORMAL
WBC NRBC COR # BLD: 11.06 10*3/MM3 (ref 3.4–10.8)

## 2019-08-03 PROCEDURE — 85025 COMPLETE CBC W/AUTO DIFF WBC: CPT | Performed by: OBSTETRICS & GYNECOLOGY

## 2019-08-03 PROCEDURE — 99024 POSTOP FOLLOW-UP VISIT: CPT | Performed by: OBSTETRICS & GYNECOLOGY

## 2019-08-03 PROCEDURE — 82962 GLUCOSE BLOOD TEST: CPT

## 2019-08-03 PROCEDURE — 99232 SBSQ HOSP IP/OBS MODERATE 35: CPT | Performed by: HOSPITALIST

## 2019-08-03 PROCEDURE — 80048 BASIC METABOLIC PNL TOTAL CA: CPT | Performed by: OBSTETRICS & GYNECOLOGY

## 2019-08-03 RX ORDER — IBUPROFEN 600 MG/1
600 TABLET ORAL EVERY 6 HOURS PRN
Qty: 40 TABLET | Refills: 1 | Status: SHIPPED | OUTPATIENT
Start: 2019-08-03 | End: 2019-08-04

## 2019-08-03 RX ORDER — DOCUSATE SODIUM 100 MG/1
100 CAPSULE, LIQUID FILLED ORAL 2 TIMES DAILY
Qty: 60 CAPSULE | Refills: 1 | Status: SHIPPED | OUTPATIENT
Start: 2019-08-03 | End: 2019-08-04 | Stop reason: SDUPTHER

## 2019-08-03 RX ORDER — OXYCODONE HYDROCHLORIDE 5 MG/1
5-10 TABLET ORAL EVERY 4 HOURS PRN
Qty: 10 TABLET | Refills: 0 | Status: SHIPPED | OUTPATIENT
Start: 2019-08-03 | End: 2019-08-12

## 2019-08-03 RX ORDER — ACETAMINOPHEN 325 MG/1
650 TABLET ORAL EVERY 6 HOURS SCHEDULED
Qty: 100 TABLET | Refills: 2 | Status: SHIPPED | OUTPATIENT
Start: 2019-08-03 | End: 2019-08-04

## 2019-08-03 RX ADMIN — INSULIN LISPRO 2 UNITS: 100 INJECTION, SOLUTION INTRAVENOUS; SUBCUTANEOUS at 12:09

## 2019-08-03 RX ADMIN — SODIUM CHLORIDE, POTASSIUM CHLORIDE, SODIUM LACTATE AND CALCIUM CHLORIDE 75 ML/HR: 600; 310; 30; 20 INJECTION, SOLUTION INTRAVENOUS at 01:49

## 2019-08-03 RX ADMIN — ACETAMINOPHEN 650 MG: 325 TABLET ORAL at 12:10

## 2019-08-03 RX ADMIN — MULTIPLE VITAMINS W/ MINERALS TAB 1 TABLET: TAB ORAL at 08:31

## 2019-08-03 RX ADMIN — VITAMIN D, TAB 1000IU (100/BT) 1000 UNITS: 25 TAB at 08:31

## 2019-08-03 RX ADMIN — ACETAMINOPHEN 650 MG: 325 TABLET ORAL at 05:18

## 2019-08-03 RX ADMIN — IBUPROFEN 600 MG: 600 TABLET ORAL at 07:05

## 2019-08-03 NOTE — PROGRESS NOTES
Case Management Discharge Note    Final Note: Plan is home w/ family.  will transport. Denies any discharge needs.    Destination      No service has been selected for the patient.      Durable Medical Equipment      No service has been selected for the patient.      Dialysis/Infusion      No service has been selected for the patient.      Home Medical Care      No service has been selected for the patient.      Therapy      No service has been selected for the patient.      Community Resources      No service has been selected for the patient.             Final Discharge Disposition Code: 01 - home or self-care

## 2019-08-03 NOTE — DISCHARGE SUMMARY
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          Hospital Course  Patient is a 68 y.o. female presented for planned surgery.  She underwent a total laparoscopic hysterectomy with bilateral salpingo-oophorectomy on 8/2/2019.  Her postoperative course has been uncomplicated.  At the time of discharge she is tolerating a regular diet ambulating with ease voiding without difficulty her incisions remain for of induration erythema or drainage.  She is having scant vaginal bleeding    Procedures Performed    Procedure(s):  radical TOTAL LAPAROSCOPIC HYSTERECTOMY BILATERAL SALPINGECTOMY WITH DAVINCI ROBOT, omentumectomy  bilateral pelvic lymph NODE DISSECTION LAPAROSCOPIC WITH DAVINCI ROBOT  -------------------       Consults:   Consults     Date and Time Order Name Status Description    7/28/2019 1923 Inpatient Gynecologic Oncology Consult Completed           Pertinent Test Results: Surgical pathology pending    Condition on Discharge: Stable    Vital Signs  Temp:  [97.4 °F (36.3 °C)-98.6 °F (37 °C)] 98.1 °F (36.7 °C)  Heart Rate:  [63-96] 72  Resp:  [15-18] 16  BP: (131-160)/(66-88) 146/76    Physical Exam:   No exam performed today,    Discharge Disposition  Home or Self Care    Discharge Medications     Discharge Medications      New Medications      Instructions Start Date   acetaminophen 325 MG tablet  Commonly known as:   TYLENOL   650 mg, Oral, Every 6 Hours Scheduled      docusate sodium 100 MG capsule  Commonly known as:  COLACE   100 mg, Oral, 2 Times Daily      ibuprofen 600 MG tablet  Commonly known as:  ADVIL,MOTRIN   600 mg, Oral, Every 6 Hours PRN      oxyCODONE 5 MG immediate release tablet  Commonly known as:  ROXICODONE   5-10 mg, Oral, Every 4 Hours PRN         Continue These Medications      Instructions Start Date   FISH OIL PO   1 capsule, Oral, Daily      lisinopril 30 MG tablet  Commonly known as:  PRINIVIL,ZESTRIL   30 mg, Oral, Daily      MULTIVITAMIN PO   1 tablet, Oral, Daily      VITAMIN D3 PO   1 capsule, Oral, Daily             Discharge Diet:     Activity at Discharge:     Follow-up Appointments  No future appointments.      Test Results Pending at Discharge   Order Current Status    Tissue Pathology Exam In process           Myron Xiong MD  08/03/19  11:21 AM    Time: Discharge 20 min

## 2019-08-03 NOTE — DISCHARGE INSTR - APPOINTMENTS
Lolis Aquino MD   Gynecologic Oncology [phone] [facility]  [address]  [facility] [address]     Next Steps: Follow up in 2 week(s)        CALL ON Monday TO MAKE APPOINTMENT    Taylor Jones, APRN  PCP - General Nurse Practitioner [phone]  [address]  [facility] [address]     Next Steps: Follow up        CALL Monday TO MAKE APPOINTMENT

## 2019-08-03 NOTE — PLAN OF CARE
Problem: Patient Care Overview  Goal: Plan of Care Review  Outcome: Ongoing (interventions implemented as appropriate)   08/03/19 0547   Coping/Psychosocial   Plan of Care Reviewed With patient;spouse   Plan of Care Review   Progress no change   OTHER   Outcome Summary Pt came back from OR at beginning of shift. F/C in place. Some complaints of pain, scheduled tylenol provides relief. Vitals remained stable, will continue to monitor.        Problem: Anemia (Adult)  Goal: Identify Related Risk Factors and Signs and Symptoms  Outcome: Ongoing (interventions implemented as appropriate)      Problem: Surgery Nonspecified (Adult)  Goal: Anesthesia/Sedation Recovery  Outcome: Ongoing (interventions implemented as appropriate)   08/03/19 0510   Goal/Outcome Evaluation   Anesthesia/Sedation Recovery progressing toward baseline

## 2019-08-03 NOTE — PLAN OF CARE
Problem: Patient Care Overview  Goal: Plan of Care Review  Outcome: Ongoing (interventions implemented as appropriate)   08/03/19 8779   Coping/Psychosocial   Plan of Care Reviewed With patient;family   OTHER   Outcome Summary Vss. pt no c/o pain or discomfort. To dc home today.     Goal: Individualization and Mutuality  Outcome: Ongoing (interventions implemented as appropriate)    Goal: Discharge Needs Assessment  Outcome: Ongoing (interventions implemented as appropriate)    Goal: Interprofessional Rounds/Family Conf  Outcome: Ongoing (interventions implemented as appropriate)      Problem: Anemia (Adult)  Goal: Identify Related Risk Factors and Signs and Symptoms  Outcome: Ongoing (interventions implemented as appropriate)    Goal: Symptom Improvement  Outcome: Ongoing (interventions implemented as appropriate)      Problem: Surgery Nonspecified (Adult)  Goal: Signs and Symptoms of Listed Potential Problems Will be Absent, Minimized or Managed (Surgery Nonspecified)  Outcome: Ongoing (interventions implemented as appropriate)

## 2019-08-03 NOTE — PROGRESS NOTES
[facility] Medicine Services  PROGRESS NOTE    Patient Name: Hailey Osborn  : 1950  MRN: [id number]    Date of Admission: 2019  Length of Stay: 6  Primary Care Physician: Taylor Jones APRN    Subjective   Subjective     CC:  F/U endometrial cancer, vaginal bleeding    HPI:  Patient seen this morning. Pain is minimal.    Review of Systems  Gen-no fevers, no chills  CV-no chest pain, no palpitations  Resp-no cough, no dyspnea  GI-no N/V/D, no abd pain    Otherwise ROS is negative except as mentioned in the HPI.    Objective   Objective     Vital Signs:   Temp:  [97.4 °F (36.3 °C)-98.6 °F (37 °C)] 98.1 °F (36.7 °C)  Heart Rate:  [63-96] 72  Resp:  [15-18] 16  BP: (137-160)/(66-88) 146/76        Physical Exam:  Gen-no acute distress  HENT-NCAT, mucous membranes moist  CV-RRR, S1 S2 normal, no m/r/g  Resp-CTAB, no wheezes or rales  Abd-soft, NT, ND, +BS  Ext-no edema  Neuro-A&Ox3, no focal deficits  Skin-no rashes  Psych-appropriate mood    Results Reviewed:  I have personally reviewed current lab, radiology, and data and agree.    Results from last 7 days   Lab Units 19  0520 19  0527 19  0825  19  0444 19  2119   WBC 10*3/mm3 11.06* 7.60  --   --  6.40  --    HEMOGLOBIN g/dL 10.0* 7.5* 9.0*   < > 7.7*  7.7*  --    HEMATOCRIT % 35.0 27.9* 32.0*   < > 27.7*  27.7*  --    PLATELETS 10*3/mm3 432 360  --   --  402  --    INR   --  1.17*  --   --   --  1.18*    < > = values in this interval not displayed.     Results from last 7 days   Lab Units 19  0520 19  0700 19  0444 19  1650   SODIUM mmol/L 136  --  137 133*   POTASSIUM mmol/L 4.4 4.5 3.9 3.6   CHLORIDE mmol/L 99  --  101 92*   CO2 mmol/L 23.0  --  26.0 24.0   BUN mg/dL 6*  --  6* 7*   CREATININE mg/dL 0.60  --  0.63 0.77   GLUCOSE mg/dL 148*  --  131* 132*   CALCIUM mg/dL 9.0  --  9.3 9.6   ALT (SGPT) U/L  --   --  8  --    AST (SGOT) U/L  --   --  12  --       Estimated Creatinine Clearance: 84.7 mL/min (by C-G formula based on SCr of 0.6 mg/dL).    Microbiology Results Abnormal     None          Imaging Results (last 24 hours)     ** No results found for the last 24 hours. **          Results for orders placed during the hospital encounter of 07/28/19   Adult Transthoracic Echo Complete W/ Cont if Necessary Per Protocol    Narrative · Left ventricular systolic function is normal. Calculated EF = 69.0%.   Estimated EF appears to be in the range of 61 - 65%  · Left ventricular wall thickness is consistent with mild-to-moderate   concentric hypertrophy  · Left ventricular diastolic dysfunction is noted (grade I a w/high LAP)   consistent with impaired relaxation  · Moderate mitral annular calcification with trace mitral regurgitation.  · The aortic valve is abnormal in structure. There is moderate   calcification of the aortic valve. Mild aortic valve stenosis is present.   Mean gradient 12 mmHg          I have reviewed the medications:  Scheduled Meds:  acetaminophen 650 mg Oral Q6H   cholecalciferol 1,000 Units Oral Daily   insulin lispro 0-7 Units Subcutaneous 4x Daily With Meals & Nightly   multivitamin with minerals 1 tablet Oral Daily   Pharmacy Meds to Bed Consult  Does not apply Daily     Continuous Infusions:  lactated ringers 75 mL/hr Last Rate: 75 mL/hr (08/03/19 0149)     PRN Meds:.dextrose  •  dextrose  •  glucagon (human recombinant)  •  HYDROmorphone **AND** naloxone  •  ibuprofen  •  ondansetron **OR** ondansetron  •  oxyCODONE  •  oxyCODONE  •  promethazine **OR** promethazine **OR** promethazine **OR** promethazine  •  simethicone  •  [COMPLETED] Insert peripheral IV **AND** sodium chloride  •  sodium chloride  •  sterile water      Assessment/Plan   Assessment / Plan     Active Hospital Problems    Diagnosis  POA   • **Endometrial cancer (CMS/HCC) [C54.1]  Unknown   • Microcytic anemia [D50.9]  Yes   • 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        Brief Hospital Course to date:  Hailey Osborn is a 68 y.o. female with hx of recently diagnosed endometrial cancer who presents with ongoing complaints of vaginal bleeding with large clots. Found to have Hb 6.5. Admitted to hospitalist service.    PLAN:  --Transfused 3 units PRBC total. H&H improved.  --s/p IDA/BSO by Dr. Aquino yesterday. Doing well.   --Discharge home today.    CODE STATUS:   Code Status and Medical Interventions:   Ordered at: 08/02/19 1853     Code Status:    CPR     Medical Interventions (Level of Support Prior to Arrest):    Full         Electronically signed by Nora Abad MD, 08/03/19, 12:02 PM.

## 2019-08-04 ENCOUNTER — READMISSION MANAGEMENT (OUTPATIENT)
Dept: CALL CENTER | Facility: HOSPITAL | Age: 69
End: 2019-08-04

## 2019-08-04 RX ORDER — ACETAMINOPHEN 325 MG/1
650 TABLET ORAL EVERY 6 HOURS SCHEDULED
Qty: 100 TABLET | Refills: 1 | Status: SHIPPED | OUTPATIENT
Start: 2019-08-04 | End: 2019-08-14

## 2019-08-04 RX ORDER — IBUPROFEN 600 MG/1
600 TABLET ORAL EVERY 6 HOURS PRN
Qty: 40 TABLET | Refills: 0 | Status: SHIPPED | OUTPATIENT
Start: 2019-08-04 | End: 2019-08-14

## 2019-08-04 RX ORDER — DOCUSATE SODIUM 100 MG/1
100 CAPSULE, LIQUID FILLED ORAL 2 TIMES DAILY
Qty: 60 CAPSULE | Refills: 0 | Status: SHIPPED | OUTPATIENT
Start: 2019-08-04 | End: 2019-08-26 | Stop reason: SDUPTHER

## 2019-08-05 ENCOUNTER — TELEPHONE (OUTPATIENT)
Dept: GYNECOLOGIC ONCOLOGY | Facility: CLINIC | Age: 69
End: 2019-08-05

## 2019-08-05 ENCOUNTER — READMISSION MANAGEMENT (OUTPATIENT)
Dept: CALL CENTER | Facility: HOSPITAL | Age: 69
End: 2019-08-05

## 2019-08-05 NOTE — OUTREACH NOTE
General Surgery Week 1 Survey      Responses   Facility patient discharged from?  [place]   Does the patient have one of the following disease processes/diagnoses(primary or secondary)?  General Surgery   Is there a successful TCM telephone encounter documented?  No   Week 1 attempt successful?  Yes   Call start time  0743   Call end time  0749   Discharge diagnosis  Total lap Hysterectomy for Endometrial CA   Is patient permission given to speak with other caregiver?  No   List who call center can speak with  her only    Meds reviewed with patient/caregiver?  Yes   Is the patient having any side effects they believe may be caused by any medication additions or changes?  No   Does the patient have all medications related to this admission filled (includes all antibiotics, pain medications, etc.)  Yes   Is the patient taking all medications as directed (includes completed medication regime)?  Yes   Does the patient have a follow up appointment scheduled with their surgeon?  Yes   Has the patient kept scheduled appointments due by today?  N/A   Comments  She has appt. in 12 days to see Surgeon.    Has home health visited the patient within 72 hours of discharge?  N/A   Did the patient receive a copy of their discharge instructions?  Yes   Nursing interventions  Reviewed instructions with patient, Educated on MyChart   What is the patient's perception of their health status since discharge?  Improving   Is the patient /caregiver able to teach back basic post-op care?  Continue use of incentive spirometry at least 1 week post discharge, Practice 'cough and deep breath', Drive as instructed by MD in discharge instructions, Take showers only when approved by MD-sponge bathe until then, No tub bath, swimming, or hot tub until instructed by MD, Keep incision areas clean,dry and protected, Do not remove steri-strips, Lifting as instructed by MD in discharge instructions   Is the patient/caregiver able to teach back signs  and symptoms of incisional infection?  Increased redness, swelling or pain at the incisonal site, Increased drainage or bleeding, Incisional warmth, Pus or odor from incision, Fever   Is the patient/caregiver able to teach back steps to recovery at home?  Set small, achievable goals for return to baseline health, Eat a well-balance diet, Practice good oral hygiene   Is the patient/caregiver able to teach back the hierarchy of who to call/visit for symptoms/problems? PCP, Specialist, Home health nurse, Urgent Care, ED, 911  Yes   Week 1 call completed?  Yes          Natalie Bhakta RN

## 2019-08-05 NOTE — OUTREACH NOTE
Prep Survey      Responses   Facility patient discharged from?  [place]   Is patient eligible?  Yes   Discharge diagnosis  Total lap Hysterectomy for Endometrial CA   Does the patient have one of the following disease processes/diagnoses(primary or secondary)?  General Surgery   Does the patient have Home health ordered?  No   Is there a DME ordered?  No   Prep survey completed?  Yes          Yulia Noriega RN

## 2019-08-05 NOTE — TELEPHONE ENCOUNTER
----- Message from Daniela Sepulveda sent at 8/5/2019  8:30 AM EDT -----  Regarding: ABD BINDER  Contact: [phone]  PATIENT CALLED AND HAS QUESTIONS ABOUT HER ABDOMINAL BINDER  Returned pt's call, informed her she did not have to wear it if she did not want to, it was for support, pt v/u.

## 2019-08-12 ENCOUNTER — READMISSION MANAGEMENT (OUTPATIENT)
Dept: CALL CENTER | Facility: HOSPITAL | Age: 69
End: 2019-08-12

## 2019-08-12 NOTE — OUTREACH NOTE
General Surgery Week 2 Survey      Responses   Facility patient discharged from?  [place]   Does the patient have one of the following disease processes/diagnoses(primary or secondary)?  General Surgery   Week 2 attempt successful?  Yes   Call start time  0935   Call end time  0939   Discharge diagnosis  Total lap Hysterectomy for Endometrial CA   Meds reviewed with patient/caregiver?  Yes   Is the patient taking all medications as directed (includes completed medication regime)?  Yes   Medication comments  No pain meds for several days   Has the patient kept scheduled appointments due by today?  N/A   Has home health visited the patient within 72 hours of discharge?  N/A   Psychosocial issues?  No   What is the patient's perception of their health status since discharge?  Improving   Is the patient /caregiver able to teach back basic post-op care?  Keep incision areas clean,dry and protected, Lifting as instructed by MD in discharge instructions, Take showers only when approved by MD-sponge bathe until then   Is the patient/caregiver able to teach back signs and symptoms of incisional infection?  Increased redness, swelling or pain at the incisonal site, Increased drainage or bleeding, Incisional warmth, Pus or odor from incision, Fever   Is the patient/caregiver able to teach back steps to recovery at home?  Eat a well-balance diet, Rest and rebuild strength, gradually increase activity   Is the patient/caregiver able to teach back the hierarchy of who to call/visit for symptoms/problems? PCP, Specialist, Home health nurse, Urgent Care, ED, 911  Yes   Additional teach back comments  Pt doing very well. She is not having much pain and her incisions are intact and free from s/s of infection. Enc fluids and she is drinking water and cranberry juice.    Week 2 call completed?  Yes          Margo Henry RN

## 2019-08-14 ENCOUNTER — OFFICE VISIT (OUTPATIENT)
Dept: GYNECOLOGIC ONCOLOGY | Facility: CLINIC | Age: 69
End: 2019-08-14

## 2019-08-14 VITALS
SYSTOLIC BLOOD PRESSURE: 220 MMHG | WEIGHT: 225 LBS | TEMPERATURE: 98.1 F | OXYGEN SATURATION: 95 % | BODY MASS INDEX: 33.23 KG/M2 | HEART RATE: 100 BPM | DIASTOLIC BLOOD PRESSURE: 95 MMHG | RESPIRATION RATE: 16 BRPM

## 2019-08-14 DIAGNOSIS — C54.1 PAPILLARY SEROUS ADENOCARCINOMA OF ENDOMETRIUM (HCC): Primary | ICD-10-CM

## 2019-08-14 PROCEDURE — 99213 OFFICE O/P EST LOW 20 MIN: CPT | Performed by: OBSTETRICS & GYNECOLOGY

## 2019-08-15 DIAGNOSIS — C54.1 PAPILLARY SEROUS ADENOCARCINOMA OF ENDOMETRIUM (HCC): Primary | ICD-10-CM

## 2019-08-19 ENCOUNTER — READMISSION MANAGEMENT (OUTPATIENT)
Dept: CALL CENTER | Facility: HOSPITAL | Age: 69
End: 2019-08-19

## 2019-08-19 NOTE — OUTREACH NOTE
General Surgery Week 3 Survey      Responses   Facility patient discharged from?  [place]   Does the patient have one of the following disease processes/diagnoses(primary or secondary)?  General Surgery   Week 3 attempt successful?  Yes   Call start time  0728   Rescheduled  Rescheduled-pt requested [Gone to MD appt, call rescheduled.]   Discharge diagnosis  Total lap Hysterectomy for Endometrial CA          Lian Perrin RN

## 2019-08-20 ENCOUNTER — READMISSION MANAGEMENT (OUTPATIENT)
Dept: CALL CENTER | Facility: HOSPITAL | Age: 69
End: 2019-08-20

## 2019-08-20 NOTE — OUTREACH NOTE
General Surgery Week 3 Survey      Responses   Facility patient discharged from?  [place]   Does the patient have one of the following disease processes/diagnoses(primary or secondary)?  General Surgery   Week 3 attempt successful?  Yes   Call start time  1545   Call end time  1546   Discharge diagnosis  Total lap Hysterectomy for Endometrial CA   Meds reviewed with patient/caregiver?  Yes   Is the patient taking all medications as directed (includes completed medication regime)?  Yes   Has the patient kept scheduled appointments due by today?  Yes   What is the patient's perception of their health status since discharge?  Improving   Nursing interventions  Nurse provided patient education   Is the patient /caregiver able to teach back basic post-op care?  Lifting as instructed by MD in discharge instructions   Is the patient/caregiver able to teach back signs and symptoms of incisional infection?  Increased redness, swelling or pain at the incisonal site, Increased drainage or bleeding, Incisional warmth, Pus or odor from incision, Fever   Is the patient/caregiver able to teach back steps to recovery at home?  Rest and rebuild strength, gradually increase activity, Eat a well-balance diet   Is the patient/caregiver able to teach back the hierarchy of who to call/visit for symptoms/problems? PCP, Specialist, Home health nurse, Urgent Care, ED, 911  Yes   Additional teach back comments  Pt says she is doing really good, says she is moving around and feeling great, no questions or concerns   Week 3 call completed?  Yes   Revoked  No further contact(revokes)-requires comment   Graduated/Revoked comments  Pt says she does not need any further calls.          Radha Garcia RN

## 2019-08-22 ENCOUNTER — OFFICE VISIT (OUTPATIENT)
Dept: RADIATION ONCOLOGY | Facility: HOSPITAL | Age: 69
End: 2019-08-22

## 2019-08-22 ENCOUNTER — DOCUMENTATION (OUTPATIENT)
Dept: GYNECOLOGIC ONCOLOGY | Facility: CLINIC | Age: 69
End: 2019-08-22

## 2019-08-22 ENCOUNTER — HOSPITAL ENCOUNTER (OUTPATIENT)
Dept: RADIATION ONCOLOGY | Facility: HOSPITAL | Age: 69
Setting detail: RADIATION/ONCOLOGY SERIES
Discharge: HOME | End: 2019-08-22

## 2019-08-22 ENCOUNTER — OFFICE VISIT (OUTPATIENT)
Dept: GYNECOLOGIC ONCOLOGY | Facility: CLINIC | Age: 69
End: 2019-08-22

## 2019-08-22 ENCOUNTER — HOSPITAL ENCOUNTER (OUTPATIENT)
Dept: INFUSION THERAPY | Facility: HOSPITAL | Age: 69
Discharge: HOME | End: 2019-08-22
Admitting: OBSTETRICS & GYNECOLOGY

## 2019-08-22 VITALS
HEART RATE: 80 BPM | DIASTOLIC BLOOD PRESSURE: 96 MMHG | SYSTOLIC BLOOD PRESSURE: 199 MMHG | TEMPERATURE: 97.8 F | RESPIRATION RATE: 16 BRPM | OXYGEN SATURATION: 98 %

## 2019-08-22 VITALS
TEMPERATURE: 97.9 F | OXYGEN SATURATION: 96 % | DIASTOLIC BLOOD PRESSURE: 86 MMHG | HEIGHT: 69 IN | BODY MASS INDEX: 32.98 KG/M2 | SYSTOLIC BLOOD PRESSURE: 187 MMHG | HEART RATE: 87 BPM | RESPIRATION RATE: 16 BRPM | WEIGHT: 222.7 LBS

## 2019-08-22 VITALS
TEMPERATURE: 98 F | DIASTOLIC BLOOD PRESSURE: 91 MMHG | BODY MASS INDEX: 32.64 KG/M2 | HEART RATE: 92 BPM | SYSTOLIC BLOOD PRESSURE: 200 MMHG | RESPIRATION RATE: 16 BRPM | WEIGHT: 221 LBS | OXYGEN SATURATION: 97 %

## 2019-08-22 DIAGNOSIS — L65.8 ALOPECIA DUE TO CYTOTOXIC DRUG: ICD-10-CM

## 2019-08-22 DIAGNOSIS — C54.1 ENDOMETRIAL CANCER (HCC): Primary | ICD-10-CM

## 2019-08-22 DIAGNOSIS — C54.1 PAPILLARY SEROUS ADENOCARCINOMA OF ENDOMETRIUM (HCC): Primary | ICD-10-CM

## 2019-08-22 DIAGNOSIS — T45.1X5A ALOPECIA DUE TO CYTOTOXIC DRUG: ICD-10-CM

## 2019-08-22 DIAGNOSIS — Z91.89 AT RISK FOR NAUSEA: ICD-10-CM

## 2019-08-22 DIAGNOSIS — C54.1 PAPILLARY SEROUS ADENOCARCINOMA OF ENDOMETRIUM (HCC): ICD-10-CM

## 2019-08-22 PROCEDURE — C1894 INTRO/SHEATH, NON-LASER: HCPCS

## 2019-08-22 PROCEDURE — 99215 OFFICE O/P EST HI 40 MIN: CPT | Performed by: NURSE PRACTITIONER

## 2019-08-22 PROCEDURE — C1751 CATH, INF, PER/CENT/MIDLINE: HCPCS

## 2019-08-22 PROCEDURE — G0463 HOSPITAL OUTPT CLINIC VISIT: HCPCS

## 2019-08-22 RX ORDER — PROMETHAZINE HYDROCHLORIDE 25 MG/1
25 TABLET ORAL EVERY 6 HOURS PRN
Qty: 30 TABLET | Refills: 5 | Status: SHIPPED | OUTPATIENT
Start: 2019-08-22 | End: 2019-08-22 | Stop reason: SDUPTHER

## 2019-08-22 RX ORDER — LEVOTHYROXINE SODIUM 0.05 MG/1
TABLET ORAL
COMMUNITY
Start: 2019-08-19

## 2019-08-22 RX ORDER — ONDANSETRON HYDROCHLORIDE 8 MG/1
8 TABLET, FILM COATED ORAL 3 TIMES DAILY PRN
Qty: 30 TABLET | Refills: 5 | Status: SHIPPED | OUTPATIENT
Start: 2019-08-22 | End: 2019-08-22 | Stop reason: SDUPTHER

## 2019-08-22 RX ORDER — FAMOTIDINE 10 MG/ML
20 INJECTION, SOLUTION INTRAVENOUS ONCE
Status: CANCELLED | OUTPATIENT
Start: 2019-08-23

## 2019-08-22 RX ORDER — DIPHENHYDRAMINE HYDROCHLORIDE 50 MG/ML
50 INJECTION INTRAMUSCULAR; INTRAVENOUS AS NEEDED
Status: CANCELLED | OUTPATIENT
Start: 2019-08-23

## 2019-08-22 RX ORDER — DEXAMETHASONE 4 MG/1
TABLET ORAL
Qty: 6 TABLET | Refills: 5 | Status: SHIPPED | OUTPATIENT
Start: 2019-08-22 | End: 2019-09-30 | Stop reason: SDUPTHER

## 2019-08-22 RX ORDER — DEXAMETHASONE 4 MG/1
TABLET ORAL
Qty: 6 TABLET | Refills: 5 | Status: SHIPPED | OUTPATIENT
Start: 2019-08-22 | End: 2019-08-22 | Stop reason: SDUPTHER

## 2019-08-22 RX ORDER — SODIUM CHLORIDE 0.9 % (FLUSH) 0.9 %
10 SYRINGE (ML) INJECTION AS NEEDED
Status: DISCONTINUED | OUTPATIENT
Start: 2019-08-22 | End: 2019-08-24 | Stop reason: HOSPADM

## 2019-08-22 RX ORDER — LORATADINE 10 MG/1
10 TABLET ORAL TAKE AS DIRECTED
Qty: 12 TABLET | Refills: 0 | Status: SHIPPED | OUTPATIENT
Start: 2019-08-22 | End: 2020-03-10

## 2019-08-22 RX ORDER — FAMOTIDINE 10 MG/ML
20 INJECTION, SOLUTION INTRAVENOUS AS NEEDED
Status: CANCELLED | OUTPATIENT
Start: 2019-08-23

## 2019-08-22 RX ORDER — LISINOPRIL 40 MG/1
20 TABLET ORAL
COMMUNITY
Start: 2019-08-19

## 2019-08-22 RX ORDER — SODIUM CHLORIDE 9 MG/ML
250 INJECTION, SOLUTION INTRAVENOUS ONCE
Status: CANCELLED | OUTPATIENT
Start: 2019-08-23

## 2019-08-22 RX ORDER — SODIUM CHLORIDE 0.9 % (FLUSH) 0.9 %
10 SYRINGE (ML) INJECTION EVERY 12 HOURS SCHEDULED
Status: DISCONTINUED | OUTPATIENT
Start: 2019-08-22 | End: 2019-08-24 | Stop reason: HOSPADM

## 2019-08-22 RX ORDER — FERROUS SULFATE 325(65) MG
325 TABLET ORAL
COMMUNITY
End: 2022-06-29

## 2019-08-22 RX ORDER — LORATADINE 10 MG/1
10 TABLET ORAL TAKE AS DIRECTED
Qty: 12 TABLET | Refills: 0 | Status: SHIPPED | OUTPATIENT
Start: 2019-08-22 | End: 2019-08-22 | Stop reason: SDUPTHER

## 2019-08-22 RX ORDER — ATORVASTATIN CALCIUM 20 MG/1
TABLET, FILM COATED ORAL
COMMUNITY
Start: 2019-08-19

## 2019-08-22 RX ORDER — PROMETHAZINE HYDROCHLORIDE 25 MG/1
25 TABLET ORAL EVERY 6 HOURS PRN
Qty: 30 TABLET | Refills: 5 | Status: SHIPPED | OUTPATIENT
Start: 2019-08-22 | End: 2019-10-15 | Stop reason: SDUPTHER

## 2019-08-22 RX ORDER — DEXAMETHASONE 4 MG/1
4 TABLET ORAL TAKE AS DIRECTED
Qty: 30 TABLET | Refills: 0 | Status: SHIPPED | OUTPATIENT
Start: 2019-08-22 | End: 2019-12-05

## 2019-08-22 RX ORDER — ONDANSETRON HYDROCHLORIDE 8 MG/1
8 TABLET, FILM COATED ORAL 3 TIMES DAILY PRN
Qty: 30 TABLET | Refills: 5 | Status: SHIPPED | OUTPATIENT
Start: 2019-08-22 | End: 2019-10-15 | Stop reason: SDUPTHER

## 2019-08-22 RX ORDER — PALONOSETRON 0.05 MG/ML
0.25 INJECTION, SOLUTION INTRAVENOUS ONCE
Status: CANCELLED | OUTPATIENT
Start: 2019-08-23

## 2019-08-22 NOTE — PROGRESS NOTES
Picc line placed inn upper Rt arm. Dressing clean, dry, and intact. Given printed discharge instructions with verbal understanding.  Discharged ambulatory with spouse

## 2019-08-22 NOTE — PROGRESS NOTES
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 female  who developed postmenopausal bleeding patient and underwent robotic assisted total laparoscopic hysterectomy, bilateral salpingo-oophorectomy, omentectomy, bilateral pelvic node dissection performed on 2019 by Dr. Li.  Final pathology revealed filtrating high-grade papillary serous adenocarcinoma with myometrial thickness 2.5 cm and tumor invasion, 1.7 cm.  Tumor size was 7 x 5 x 2.5 cm.  Pelvic and periaortic lymph nodes were negative and there was no cervical stromal involvement.  She is undergoing 6 cycles of chemotherapy and has been referred for consideration of vaginal brachytherapy to decrease the risk of recurrence to vaginal apex.  She has healed well from surgery and has no complaints..      Allergies   Allergen Reactions   • Codeine Other (See Comments)     jerking       Social History     Tobacco Use   • Smoking status: Former Smoker   • Tobacco comment: quit 1980   Substance Use Topics   • Alcohol use: No     Frequency: Never   • Drug use: No         Past Medical History:   Diagnosis Date   • Endometrial cancer (CMS/HCC)    • Hyperlipidemia    • Hypertension    • Murmur    • Post-menopausal bleeding    • Pre-diabetes        family history is not on file.     Past Surgical History:   Procedure Laterality Date   • APPENDECTOMY     •  SECTION     • NODE DISSECTION LAPAROSCOPIC N/A 2019    Procedure: PELVIC LYMPH NODE DISSECTION LAPAROSCOPIC WITH DAVINCI ROBOT BILATERAL;  Surgeon: Lolis Aquino MD;  Location: [facility];  Service: [facility]   • TOTAL LAPAROSCOPIC HYSTERECTOMY N/A 2019     "Procedure: RADICAL TOTAL LAPAROSCOPIC HYSTERECTOMY BILATERAL SALPINGECTOMY WITH DAVINCI ROBOT, OMENTUMECTOMY;  Surgeon: Lolis Aquino MD;  Location: [facility];  Service: [facility]        Review of Systems   Constitutional: Positive for fatigue.   All other systems reviewed and are negative.          Objective   VITAL SIGNS:   Vitals:    08/22/19 1023   BP: (!) 187/86   Pulse: 87   Resp: 16   Temp: 97.9 °F (36.6 °C)   TempSrc: Temporal   SpO2: 96%  Comment: RA   Weight: 101 kg (222 lb 11.2 oz)   Height: 175.3 cm (69\")   PainSc: 0-No pain        KPS       90%    Physical Exam   Constitutional: She is oriented to person, place, and time. She appears well-developed and well-nourished. No distress.   HENT:   Head: Normocephalic and atraumatic.   Eyes: EOM are normal. No scleral icterus.   Neck: Neck supple.   Cardiovascular: Normal rate and regular rhythm.   Pulmonary/Chest: Effort normal and breath sounds normal.   Abdominal:   One area from surgery still healing in skin fold of left abdominal wall.  No erythema or drainage.   Lymphadenopathy:     She has no cervical adenopathy.   Neurological: She is alert and oriented to person, place, and time.   Nursing note and vitals reviewed.  Pelvic exam reveals normal external genitalia.  Bimanual exam reveals no masses in the vagina and a well-healing vaginal apex with minimal blood present.         The following portions of the patient's history were reviewed and updated as appropriate: allergies, current medications, past family history, past medical history, past social history, past surgical history and problem list.    Assessment      Hailey Osborn  is a t 69 y.o. female  who underwent robotic assisted total laparoscopic hysterectomy, bilateral salpingo-oophorectomy, omentectomy, bilateral pelvic node dissection performed on 8/2/2019 by Dr. Li.  Final pathology revealed filtrating high-grade papillary serous adenocarcinoma with myometrial thickness 2.5 cm and " tumor invasion, 1.7 cm.  Tumor size was 7 x 5 x 2.5 cm.  Pelvic and periaortic lymph nodes were negative and there was no cervical stromal involvement.  She is undergoing 6 cycles of chemotherapy and has been referred for consideration of vaginal brachytherapy to decrease the risk of recurrence to vaginal apex.  She is healing well from surgery and has no complaints..        RECOMMENDATIONS: According NCCN guidelines I recommend vaginal brachii therapy between cycles 2 and 3 of chemotherapy.  The pros and cons, risks and benefits of treatment were discussed with Ms. Osborn and informed consent obtained.  I anticipate 30 Gy in 5 fractions treated to the surface of the upper vagina.  Informed consent was obtained today and she will return for treatment planning and we will start between cycles 2 and 3.  Thank you very much for asking me to see Mrs. Osborn..          Leandra Becker MD      Errors in dictation may reflect use of voice recognition software and not all errors in transcription may have been detected prior to signing.

## 2019-08-22 NOTE — PROGRESS NOTES
Patient scheduled to begin carboplatin at AUC 6+ paclitaxel 175 mg/m² IV q. 21 days tomorrow for management of uterine serous carcinoma.  Integrated oncology reflex testing received, shows amplification of HER-2, confirmed HER-2 positive.  Results reviewed with Dr. Aquino.  NCCN guidelines reviewed by MD and APRN. preferred regimen is carboplatin/paclitaxel/trastuzumab for patients stage III/IV disease or recurrence with HER-2 positive uterine serous carcinoma.  Patient is Stage IB by pathology, will not add trastuzumab up front due to earlier staging.

## 2019-08-22 NOTE — PROGRESS NOTES
CHEMOTHERAPY PREPARATION    Hailey Osborn  [id number]  1950    Chief Complaint: Treatment preparation and needs assessment    History of present illness:  Hailey Osborn is a 69 y.o. year old female who is here today for chemotherapy preparation and needs assessment. She is accompanied by her . Oncology timeline thus far is outlined below. 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       Of note: Integrated Oncology reflex testing was received today and revealed HER2 amplification/positivity in patient's tumor.  NCCN guidelines reviewed. Newest recommendations are to add trastuzumab to carboplatin/paclitaxel in advanced (Stage III/IV) and recurrent uterine serous carcinomas that are HER2-positive. Patient has been found to be pathologic Stage IB, addition of trastuzumab not indicated in her early stage.     Oncology History:       Papillary serous adenocarcinoma of endometrium (CMS/HCC)    7/22/2019 Biopsy     Patient presented to gyn, Dr. Patel, with c/o vaginal bleeding x 1 year. EMB revealed high grade papillary serous endometrial cancer.          7/28/2019 -  Other Event     While outpatient Gyn Oncology referral was pending, patient developed heavier vaginal bleeding and symptomatic anemia requiring hospital admission. Inpatient Gyn Oncology consult. Anemia managed with blood transfusions.   CA-125 = 9.8         7/29/2019 Imaging     Pre-op CT negative for chest disease or obvious metastatic disease. Enlarged uterus with underlying mass noted with several small questionable nodes.          8/2/2019 Surgery     RTLH/BSO, omentectomy, and bilateral pelvic lymph node dissection. Final pathology revealed papillary serous adenocarcinoma with invasion into 1.7 cm of 2.5 cm myometrium. No cervical involvement. Tubes, ovaries, omentum, and all nodes  negative. No LVSI. MSI normal. Stage IB grade 3              Past Medical History:   Diagnosis Date   • Endometrial cancer (CMS/HCC)     Stage IB grade 3 pap serous   • Hyperlipidemia    • Hypertension    • Murmur    • Post-menopausal bleeding    • Pre-diabetes        Past Surgical History:   Procedure Laterality Date   • APPENDECTOMY     •  SECTION     • NODE DISSECTION LAPAROSCOPIC N/A 2019    Procedure: PELVIC LYMPH NODE DISSECTION LAPAROSCOPIC WITH DAVINCI ROBOT BILATERAL;  Surgeon: Lolis Aquino MD;  Location:  [facility];  Service: DaVinci   • TOTAL LAPAROSCOPIC HYSTERECTOMY N/A 2019    Procedure: RADICAL TOTAL LAPAROSCOPIC HYSTERECTOMY BILATERAL SALPINGECTOMY WITH DAVINCI ROBOT, OMENTUMECTOMY;  Surgeon: Lolis Aquino MD;  Location: [facility] OR;  Service: DaVinci       MEDICATIONS: The current medication list was reviewed and reconciled.     Allergies:  is allergic to codeine.    Family History   Problem Relation Age of Onset   • Pancreatic cancer Brother          Review of Systems   Constitutional: Negative for fatigue, fever and unexpected weight change.   HENT: Negative for congestion, hearing loss, sore throat and trouble swallowing.    Eyes: Negative for visual disturbance.   Respiratory: Negative for cough, shortness of breath and wheezing.    Cardiovascular: Negative for chest pain and leg swelling.   Gastrointestinal: Negative for abdominal distention, abdominal pain, constipation, diarrhea, nausea and vomiting.   Endocrine: Negative.    Genitourinary: Negative.    Musculoskeletal: Negative for arthralgias, back pain and gait problem.   Skin: Negative.    Allergic/Immunologic: Negative.    Neurological: Negative for dizziness, weakness, numbness and headaches.   Hematological: Negative for adenopathy. Does not bruise/bleed easily.   Psychiatric/Behavioral: Negative.    All other systems reviewed and are negative.      Physical Exam  Vital Signs: BP (!) 200/91   Pulse 92   Temp  98 °F (36.7 °C)   Resp 16   Wt 100 kg (221 lb)   SpO2 97%   BMI 32.64 kg/m²    General Appearance:  alert, cooperative, no apparent distress and appears stated age   Neurologic/Psychiatric: A&O x 3, gait steady, appropriate affect   HEENT:  Normocephalic, without obvious abnormality, mucous membranes moist   Lungs:   Clear to auscultation bilaterally; respirations regular, even, and unlabored bilaterally   Heart:  Regular rate and rhythm, no murmurs appreciated   Extremities: Normal, atraumatic; no clubbing, cyanosis, or edema    Skin: No rashes, lesions, or abnormal coloration noted     ECOG Performance Status: (0) Fully active, able to carry on all predisease performance without restriction          NEEDS ASSESSMENTS    Genetics  The patient's new diagnosis and family history have been reviewed for genetic counseling needs. A genetic referral is recommended. I explained that this is standard of care for papillary serous cell types. Patient states she is very apprehensive about this and unsure. She refuses referral today. This will continue to be recommended and addressed.     Psychosocial  The patient has completed a PHQ-9 Depression Screening and the Distress Thermometer (DT) today.   PHQ-9 results show 0 (No Depression). The patient scored their distress today as 2 on a scale of 0-10 with 0 being no distress and 10 being extreme distress.   Problems marked by the patient as being an issue for them within the last week include no problems.   Results were reviewed along with psychosocial resources offered by our cancer center. Our oncology social worker will be flagged for a DT score of 4 or above, and a same day call will be made for a score of 9 or 10. A mental health referral is not recommended at this time. The patient is understanding that a referral to [facility] can be made if needed at any time.   Copies of patient's questionnaires will be scanned into EMR for details and further  "reference.    Barriers to care  A barriers form was also completed by the patient today. We discussed services offered by our facility to help her have adequate access to care. The patient was given the name and card for our Oncology Social Worker, Sandra Hernandez. Based upon barriers assessment today, the patient will not require a follow-up call from the  to further discuss needs.   A copy of the barriers form will also be scanned into EMR for details and further reference.     VAD Assessment  The patient and I discussed planned intervenous chemotherapy as well as other IV treatments that are often needed throughout the course of treatment. These may include, but are not limited to blood transfusions, antibiotics, and IV hydration. The vasculature does not appear to be adequate for multiple peripheral IVs throughout their treatment course. Discussed risks and benefits of VADs. The patient would not like to pursue Port-A-Cath insertion prior to initiation of treatment. She has decided to decline port and desires PICC, insert scheduled later today.    Advanced Care Planning  The patient and I discussed advanced care planning, \"Conversations that Matter\".   This service was offered, free of charge, for development of advance directives with a certified ACP facilitator.  The patient does not have an up-to-date advanced directive. This document is not on file with our office. The patient is not interested in an appointment with one of our facilitators to create or update their advanced directives.      Palliative Care  The patient and I discussed palliative care services. Palliative care is not the same as Hospice care. This is specialized medical care for people living with serious illness with the goal of improving quality of life for the patient and their family. [facility] has partnered with [facility] to offer our patients outpatient palliative care early along with their treatment to " assist in coordination of care, symptom management, pain management, and medical decision making.  Oncology criteria for palliative care referral is not met at this time. The patient is not interested in a palliative care consultation.     Additional Referral needs  none      CHEMOTHERAPY EDUCATION    Booklets Given: Chemotherapy and You [x]  Eating Hints [x]    Sexuality/Fertility Books []      Chemotherapy/Biotherapy Education Sheets: (list all that apply)  nausea management, acid reflux management, Cancer resourse contacts information, skin and mouth care, vaccination information and wig information                                                                                                                                                                 Chemotherapy Regimen:   Treatment Plans     Name Type Plan dates Plan Provider         Active    OP UTERINE PACLitaxel / CARBOplatin (Q21D) ONCOLOGY TREATMENT  8/22/2019 - Present Lolis Aquino MD                    TOPICS EDUCATION PROVIDED COMMENTS   ANEMIA:  role of RBC, cause, s/s, ways to manage, role of transfusion [x] Educated re: ying labs at day 10 each cycle. Will be notified of each ying result and interventions will be made upon results   THROMBOCYTOPENIA:  role of platelet, cause, s/s, ways to prevent bleeding, things to avoid, when to seek help [x]    NEUTROPENIA:  role of WBC, cause, infection precautions, s/s of infection, when to call MD [x]    NUTRITION & APPETITE CHANGES:  importance of maintaining healthy diet & weight, ways to manage to improve intake, dietary consult, exercise regimen [x]    DIARRHEA:  causes, s/s of dehydration, ways to manage, dietary changes, when to call MD []    CONSTIPATION:  causes, ways to manage, dietary changes, when to call MD [x] Discussed basic home constipation management. If unable to improve with colace and Miralax, call for further instructions. Risk for bowel obstruction, importance of adequate  water intake daily   NAUSEA & VOMITING:  cause, use of antiemetics, dietary changes, when to call MD [x] Reviewed home anti-emetics and how to use.    MOUTH SORES:  causes, oral care, ways to manage []    ALOPECIA:  cause, ways to manage, resources [x] Community resources provided. Our office can provide cranial prosthesis order to submit to insurance if she desires   INFERTILITY & SEXUALITY:  causes, fertility preservation options, sexuality changes, ways to manage, importance of birth control []    NERVOUS SYSTEM CHANGES:  causes, s/s, neuropathies, cognitive changes, ways to manage [x] Potential for peripheral neuropathy, importance of informing office of symptoms   PAIN:  causes, ways to manage [x] ????   SKIN & NAIL CHANGES:  cause, s/s, ways to manage []    ORGAN TOXICITIES:  cause, s/s, need for diagnostic tests, labs, when to notify MD [x] Risk for renal toxicity with this regimen, importance of adequate hydration   SURVIVORSHIP:  distress, distress assessment, secondary malignancies, early/late effects, follow-up, social issues, social support [x]    HOME CARE:  use of spill kits, storing of PO chemo, how to manage bodily fluids [x]    MISCELLANEOUS:  drug interactions, administration, vesicant, et [x] Pre-treatment medications reviewed by RN and released to pharmacy       Assessment and Plan:    Diagnoses and all orders for this visit:    Papillary serous adenocarcinoma of endometrium (CMS/HCC)    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   The patient and I have reviewed their new cancer diagnosis and scheduled treatment plan. Needs assessment was completed including genetics, psychosocial needs, barriers to care, VAD evaluation, advanced care planning, and palliative care services. Referrals have been ordered as appropriate based upon our evaluation and patient  desires.     I re-iterated the recommendation for genetic counseling. Patient will consider this further and this will be re-assessed at her next visit.     Patient scheduled for PICC insert later this afternoon, keep appointment. Arrive to infusion clinic tomorrow morning to initiate chemotherapy.    Chemotherapy teaching was also completed today as documented above. Adequate time was given to answer all questions to her satisfaction. Patient and family are aware of their care team members and contact information if they have questions or problems throughout the treatment course. Needs assessments and education has been completed. The patient is adequately prepared to begin treatment as scheduled.       Pennie Mata, APRN

## 2019-08-22 NOTE — ADDENDUM NOTE
Encounter addended by: Alberta Castillo CNA on: 8/22/2019 3:15 PM   Actions taken: Flowsheet accepted

## 2019-08-23 ENCOUNTER — HOSPITAL ENCOUNTER (OUTPATIENT)
Dept: ONCOLOGY | Facility: HOSPITAL | Age: 69
Setting detail: INFUSION SERIES
Discharge: HOME | End: 2019-08-23

## 2019-08-23 ENCOUNTER — EDUCATION (OUTPATIENT)
Dept: ONCOLOGY | Facility: HOSPITAL | Age: 69
End: 2019-08-23

## 2019-08-23 ENCOUNTER — TRANSCRIBE ORDERS (OUTPATIENT)
Dept: GYNECOLOGIC ONCOLOGY | Facility: CLINIC | Age: 69
End: 2019-08-23

## 2019-08-23 VITALS
RESPIRATION RATE: 16 BRPM | HEART RATE: 82 BPM | SYSTOLIC BLOOD PRESSURE: 139 MMHG | DIASTOLIC BLOOD PRESSURE: 71 MMHG | BODY MASS INDEX: 32.73 KG/M2 | TEMPERATURE: 98.3 F | OXYGEN SATURATION: 94 % | WEIGHT: 221 LBS | HEIGHT: 69 IN

## 2019-08-23 DIAGNOSIS — C54.1 PAPILLARY SEROUS ADENOCARCINOMA OF ENDOMETRIUM (HCC): ICD-10-CM

## 2019-08-23 DIAGNOSIS — Z45.2: Primary | ICD-10-CM

## 2019-08-23 DIAGNOSIS — C54.1 PAPILLARY SEROUS ADENOCARCINOMA OF ENDOMETRIUM (HCC): Primary | ICD-10-CM

## 2019-08-23 DIAGNOSIS — C54.1 ENDOMETRIAL CANCER (HCC): Primary | ICD-10-CM

## 2019-08-23 DIAGNOSIS — R73.03 PREDIABETES: ICD-10-CM

## 2019-08-23 DIAGNOSIS — I10 HYPERTENSION, UNSPECIFIED TYPE: ICD-10-CM

## 2019-08-23 LAB
ALBUMIN SERPL-MCNC: 4.5 G/DL (ref 3.5–5.2)
ALBUMIN/GLOB SERPL: 1.5 G/DL
ALP SERPL-CCNC: 90 U/L (ref 39–117)
ALT SERPL W P-5'-P-CCNC: 16 U/L (ref 1–33)
ANION GAP SERPL CALCULATED.3IONS-SCNC: 16 MMOL/L (ref 5–15)
AST SERPL-CCNC: 20 U/L (ref 1–32)
BILIRUB SERPL-MCNC: 0.3 MG/DL (ref 0.2–1.2)
BUN BLD-MCNC: 11 MG/DL (ref 8–23)
BUN/CREAT SERPL: 15.5 (ref 7–25)
CALCIUM SPEC-SCNC: 9.7 MG/DL (ref 8.6–10.5)
CANCER AG125 SERPL QL: 197.3 U/ML (ref 0–38.1)
CHLORIDE SERPL-SCNC: 98 MMOL/L (ref 98–107)
CO2 SERPL-SCNC: 24 MMOL/L (ref 22–29)
CREAT BLD-MCNC: 0.71 MG/DL (ref 0.57–1)
CREAT BLDA-MCNC: 0.6 MG/DL
CREAT BLDA-MCNC: 0.6 MG/DL (ref 0.6–1.3)
CYTO UR: NORMAL
ERYTHROCYTE [DISTWIDTH] IN BLOOD BY AUTOMATED COUNT: 28.4 % (ref 12.3–15.4)
GFR SERPL CREATININE-BSD FRML MDRD: 82 ML/MIN/1.73
GLOBULIN UR ELPH-MCNC: 3.1 GM/DL
GLUCOSE BLD-MCNC: 162 MG/DL (ref 65–99)
GLUCOSE BLDC GLUCOMTR-MCNC: 116 MG/DL (ref 70–130)
HCT VFR BLD AUTO: 36 % (ref 34–46.6)
HGB BLD-MCNC: 11.2 G/DL (ref 12–15.9)
LAB AP CASE REPORT: NORMAL
LAB AP CLINICAL INFORMATION: NORMAL
LAB AP INTEGRATED ONCOLOGY, ADDENDUM 2: NORMAL
LAB AP INTEGRATED ONCOLOGY, ADDENDUM: NORMAL
LYMPHOCYTES # BLD AUTO: 0.9 10*3/MM3 (ref 0.7–3.1)
LYMPHOCYTES NFR BLD AUTO: 15.3 % (ref 19.6–45.3)
MCH RBC QN AUTO: 23.6 PG (ref 26.6–33)
MCHC RBC AUTO-ENTMCNC: 31.2 G/DL (ref 31.5–35.7)
MCV RBC AUTO: 75.6 FL (ref 79–97)
MONOCYTES # BLD AUTO: 0.2 10*3/MM3 (ref 0.1–0.9)
MONOCYTES NFR BLD AUTO: 3.3 % (ref 5–12)
NEUTROPHILS # BLD AUTO: 4.6 10*3/MM3 (ref 1.7–7)
NEUTROPHILS NFR BLD AUTO: 81.4 % (ref 42.7–76)
PATH REPORT.FINAL DX SPEC: NORMAL
PATH REPORT.GROSS SPEC: NORMAL
PLATELET # BLD AUTO: 301 10*3/MM3 (ref 140–450)
PMV BLD AUTO: 7.1 FL (ref 6–12)
POTASSIUM BLD-SCNC: 4.2 MMOL/L (ref 3.5–5.2)
PROT SERPL-MCNC: 7.6 G/DL (ref 6–8.5)
RBC # BLD AUTO: 4.77 10*6/MM3 (ref 3.77–5.28)
SODIUM BLD-SCNC: 138 MMOL/L (ref 136–145)
WBC NRBC COR # BLD: 5.6 10*3/MM3 (ref 3.4–10.8)

## 2019-08-23 PROCEDURE — 25010000002 DIPHENHYDRAMINE PER 50 MG: Performed by: NURSE PRACTITIONER

## 2019-08-23 PROCEDURE — 96417 CHEMO IV INFUS EACH ADDL SEQ: CPT

## 2019-08-23 PROCEDURE — 96375 TX/PRO/DX INJ NEW DRUG ADDON: CPT

## 2019-08-23 PROCEDURE — 25010000002 DEXAMETHASONE PER 1 MG: Performed by: NURSE PRACTITIONER

## 2019-08-23 PROCEDURE — 82962 GLUCOSE BLOOD TEST: CPT

## 2019-08-23 PROCEDURE — 25010000002 CARBOPLATIN PER 50 MG: Performed by: NURSE PRACTITIONER

## 2019-08-23 PROCEDURE — 96413 CHEMO IV INFUSION 1 HR: CPT

## 2019-08-23 PROCEDURE — 85025 COMPLETE CBC W/AUTO DIFF WBC: CPT | Performed by: NURSE PRACTITIONER

## 2019-08-23 PROCEDURE — 96415 CHEMO IV INFUSION ADDL HR: CPT

## 2019-08-23 PROCEDURE — 25010000002 PALONOSETRON 0.25 MG/5ML SOLUTION PREFILLED SYRINGE: Performed by: NURSE PRACTITIONER

## 2019-08-23 PROCEDURE — 25010000002 PACLITAXEL PER 30 MG: Performed by: NURSE PRACTITIONER

## 2019-08-23 PROCEDURE — 25010000002 FOSAPREPITANT PER 1 MG: Performed by: NURSE PRACTITIONER

## 2019-08-23 PROCEDURE — 86304 IMMUNOASSAY TUMOR CA 125: CPT | Performed by: NURSE PRACTITIONER

## 2019-08-23 PROCEDURE — 82565 ASSAY OF CREATININE: CPT

## 2019-08-23 PROCEDURE — 96376 TX/PRO/DX INJ SAME DRUG ADON: CPT

## 2019-08-23 PROCEDURE — 96372 THER/PROPH/DIAG INJ SC/IM: CPT

## 2019-08-23 PROCEDURE — 96367 TX/PROPH/DG ADDL SEQ IV INF: CPT

## 2019-08-23 PROCEDURE — 80053 COMPREHEN METABOLIC PANEL: CPT | Performed by: NURSE PRACTITIONER

## 2019-08-23 RX ORDER — ENALAPRILAT 2.5 MG/2ML
0.62 INJECTION INTRAVENOUS ONCE AS NEEDED
Status: DISCONTINUED | OUTPATIENT
Start: 2019-08-23 | End: 2019-08-24 | Stop reason: HOSPADM

## 2019-08-23 RX ORDER — FAMOTIDINE 10 MG/ML
20 INJECTION, SOLUTION INTRAVENOUS AS NEEDED
Status: DISCONTINUED | OUTPATIENT
Start: 2019-08-23 | End: 2019-08-24 | Stop reason: HOSPADM

## 2019-08-23 RX ORDER — FAMOTIDINE 10 MG/ML
20 INJECTION, SOLUTION INTRAVENOUS ONCE
Status: COMPLETED | OUTPATIENT
Start: 2019-08-23 | End: 2019-08-23

## 2019-08-23 RX ORDER — PALONOSETRON 0.05 MG/ML
0.25 INJECTION, SOLUTION INTRAVENOUS ONCE
Status: COMPLETED | OUTPATIENT
Start: 2019-08-23 | End: 2019-08-23

## 2019-08-23 RX ORDER — DIPHENHYDRAMINE HYDROCHLORIDE 50 MG/ML
50 INJECTION INTRAMUSCULAR; INTRAVENOUS AS NEEDED
Status: DISCONTINUED | OUTPATIENT
Start: 2019-08-23 | End: 2019-08-24 | Stop reason: HOSPADM

## 2019-08-23 RX ORDER — ENALAPRILAT 2.5 MG/2ML
0.62 INJECTION INTRAVENOUS ONCE AS NEEDED
Status: CANCELLED
Start: 2019-08-23

## 2019-08-23 RX ORDER — SODIUM CHLORIDE 9 MG/ML
250 INJECTION, SOLUTION INTRAVENOUS ONCE
Status: COMPLETED | OUTPATIENT
Start: 2019-08-23 | End: 2019-08-23

## 2019-08-23 RX ORDER — SODIUM CHLORIDE 0.9 % (FLUSH) 0.9 %
20 SYRINGE (ML) INJECTION AS NEEDED
Status: CANCELLED | OUTPATIENT
Start: 2019-08-23

## 2019-08-23 RX ADMIN — DEXAMETHASONE SODIUM PHOSPHATE 20 MG: 4 INJECTION, SOLUTION INTRA-ARTICULAR; INTRALESIONAL; INTRAMUSCULAR; INTRAVENOUS; SOFT TISSUE at 10:01

## 2019-08-23 RX ADMIN — CARBOPLATIN 720 MG: 10 INJECTION, SOLUTION INTRAVENOUS at 14:14

## 2019-08-23 RX ADMIN — PACLITAXEL 375 MG: 6 INJECTION, SOLUTION INTRAVENOUS at 11:12

## 2019-08-23 RX ADMIN — SODIUM CHLORIDE 250 ML: 9 INJECTION, SOLUTION INTRAVENOUS at 09:57

## 2019-08-23 RX ADMIN — SODIUM CHLORIDE 150 MG: 9 INJECTION, SOLUTION INTRAVENOUS at 10:25

## 2019-08-23 RX ADMIN — FAMOTIDINE 20 MG: 10 INJECTION, SOLUTION INTRAVENOUS at 10:01

## 2019-08-23 RX ADMIN — DIPHENHYDRAMINE HYDROCHLORIDE 50 MG: 50 INJECTION INTRAMUSCULAR; INTRAVENOUS at 10:01

## 2019-08-23 RX ADMIN — PALONOSETRON 0.25 MG: 0.25 INJECTION, SOLUTION INTRAVENOUS at 09:57

## 2019-08-23 NOTE — PLAN OF CARE
Outpatient Infusion • [address] [facility] • [address] • [place] [id number] • [phone]      CHEMOTHERAPY EDUCATION SHEET    NAME:  Hailey Osborn      : 1950           DATE: 19    Booklets Given: Chemotherapy and You []  Eating Hints []    Sexuality/Fertility Books []     Chemotherapy/Biotherapy Education Sheets: (list all that apply)  Carboplatin and  Paclitaxel                                                                                                                                                                Chemotherapy Regimen: Carboplatin + Paclitaxel Cycle 1 Day 1 (21 Day Cycle)    TOPICS EDUCATION PROVIDED EDUCATION REINFORCED COMMENTS   ANEMIA:  role of RBC, cause, s/s, ways to manage, role of transfusion [] [x] Discussed risk of decreased red blood cells which leads to fatigue. Patient encouraged to stay active and continue normal activities.   THROMBOCYTOPENIA:  role of platelet, cause, s/s, ways to prevent bleeding, things to avoid, when to seek help [] [x] Discussed increased bleeding risk due to decreased platelets and to be extra cautious with activities such as shaving and brushing teeth   NEUTROPENIA:  role of WBC, cause, infection precautions, s/s of infection, when to call MD [] [x] Discussed risk of increased infection due to low WBC count, infection prophylaxis, and signs of infection. Contact MD if fever of 100.4 or greater   NUTRITION & APPETITE CHANGES:  importance of maintaining healthy diet & weight, ways to manage to improve intake, dietary consult, exercise regimen [] [x] Emphasized the importance of a healthy diet and maintaining physical activity despite increased fatigue and loss of appetite   DIARRHEA:  causes, s/s of dehydration, ways to manage, dietary changes, when to call MD [] [x] Discussed that diarrhea may occur during treatment and that over the counter Imodium can be used unless persistent in which MD should be called.    CONSTIPATION:  causes,  ways to manage, dietary changes, when to call MD [] [x] Risk of constipation occurring during treatment was discussed and to alert MD if this occurs so an appropriate regimen can be prescribed. Do not use over the counter enemas or suppositories.   NAUSEA & VOMITING:  cause, use of antiemetics, dietary changes, when to call MD [] [x] Spoke with them about the anti-emetics that they have been given as well as confirming that they filled their prescription for ondansetron. Alerted them that the nauseamay worsen on day 3 due to other anti-emetics wearing off and to take the ondansetron as soon as they become nauseous and not to wait and see if it improves.    MOUTH SORES:  causes, oral care, ways to manage [] [x] Spoke with patient about the risk of getting mouth sores and discussed preventative mouth wash recipe of baking soda, water, and salt. If mouth sores occur alert MD for treatment with prescription mouthwash.   ALOPECIA:  cause, ways to manage, resources [] [x] Spoke with the patient about the risk of hair loss while on therapy. Made them aware that a prescription can be written for a wig and their insurance may cover it.   INFERTILITY & SEXUALITY:  causes, fertility preservation options, sexuality changes, ways to manage, importance of birth control [] [x] Importance of using a condom for the first 48 hours after treatment was discussed due to toxicity being present in body fluids.   NERVOUS SYSTEM CHANGES:  causes, s/s, neuropathies, cognitive changes, ways to manage [] [x] Discussed that they may experience peripheral neuropathy while on treatment. If it becomes too bothersome or severe contact   PAIN:  causes, ways to manage [] [x] Informed the patient that they may experience some muscle and joint pain. Recommended that they take acetaminophen or ibuprofen over the counter.???   SKIN & NAIL CHANGES:  cause, s/s, ways to manage [] [x] Discussed the risk of skin and nail color changes with treatment.     ORGAN TOXICITIES:  cause, s/s, need for diagnostic tests, labs, when to notify MD [] []    SURVIVORSHIP:  distress, distress assessment, secondary malignancies, early/late effects, follow-up, social issues, social support [] []    HOME CARE:  use of spill kits, storing of PO chemo, how to manage bodily fluids [] [x] Emphasized that gloves be worn by others when cleaning toilet that patient uses or cleaning up bodily fluids as well as washing laundry that contains bodily fluids separate from other clothes and putting toilet seat down when flushing toilet for 48 hours after treatment administration.   MISCELLANEOUS:  drug interactions, administration, vesicant, et [] [x] Discussed chance of swelling of ankles and feet as well as metallic taste that could occur. Recommended that plastic utensils be used throughout treatment to help with taste change.     Referrals:   None     Notes:   Education was provided directly to the patient who was engaged in conversation. A patient specific treatment calendar and [employer]'s business card was given to patient and a Medication history completed during interaction.

## 2019-08-26 ENCOUNTER — TELEPHONE (OUTPATIENT)
Dept: GYNECOLOGIC ONCOLOGY | Facility: CLINIC | Age: 69
End: 2019-08-26

## 2019-08-26 RX ORDER — DOCUSATE SODIUM 100 MG/1
100 CAPSULE, LIQUID FILLED ORAL 2 TIMES DAILY
Qty: 60 CAPSULE | Refills: 3 | Status: SHIPPED | OUTPATIENT
Start: 2019-08-26 | End: 2019-12-26

## 2019-08-26 NOTE — TELEPHONE ENCOUNTER
Pt called this morning and I returned her call.  She asked if joint pain can occur after her chemo.  She did lots of house work over the weekend then had joint pain when she sat down to rest.  I told her it was likely due to a combination of chemo and lots of house work.  She has been taking tylenol and rotating with ibuprofen and this has been providing adequate pain relief.  I told her she can continue this and to call back if pain worsens or current meds not controlling pain.  She verbalized understanding.  We discussed her upcoming appointment schedule.  I sent msg to RAY Mac to help get office visit here on 09/12/19 at 9:15am with chemo to follow scheduled.

## 2019-08-27 ENCOUNTER — TELEPHONE (OUTPATIENT)
Dept: GYNECOLOGIC ONCOLOGY | Facility: CLINIC | Age: 69
End: 2019-08-27

## 2019-08-27 RX ORDER — OXYCODONE HYDROCHLORIDE 5 MG/1
5 TABLET ORAL EVERY 6 HOURS PRN
Qty: 20 TABLET | Refills: 0 | Status: SHIPPED | OUTPATIENT
Start: 2019-08-27 | End: 2019-08-30 | Stop reason: SDUPTHER

## 2019-08-27 NOTE — TELEPHONE ENCOUNTER
Pt called this morning and I returned her call.  She said BLE pain from knees to feet has worsened.  She is taking ibuprofen and tylenol with very little relief.  She became tearful during conversation.  Pain is constant now and she rates it at 10   (0 - 10).  She slept very little last night due to pain. Told her I will discuss with Dr. Aquino and call her back.  She verbalized understanding.     08/27/19:  I called pt and informed pt that Dr. Aquino sent oxycodone in to her pharmacy.  Per Dr. Aquino, pt to continue rotating ibuprofen and acetaminophen as well.  Instructed pt not to drive or operate heavy machinery while taking this med.  Asked that she call me back tomorrow with an update.  She agreed and verbalized understanding.

## 2019-08-29 ENCOUNTER — HOSPITAL ENCOUNTER (OUTPATIENT)
Dept: INFUSION THERAPY | Facility: HOSPITAL | Age: 69
Setting detail: INFUSION SERIES
Discharge: HOME | End: 2019-08-29

## 2019-08-29 VITALS
RESPIRATION RATE: 18 BRPM | HEART RATE: 115 BPM | SYSTOLIC BLOOD PRESSURE: 148 MMHG | DIASTOLIC BLOOD PRESSURE: 88 MMHG | TEMPERATURE: 97.9 F

## 2019-08-29 DIAGNOSIS — C54.1 ENDOMETRIAL CANCER (HCC): ICD-10-CM

## 2019-08-29 DIAGNOSIS — C54.1 PAPILLARY SEROUS ADENOCARCINOMA OF ENDOMETRIUM (HCC): Primary | ICD-10-CM

## 2019-08-29 PROCEDURE — G0463 HOSPITAL OUTPT CLINIC VISIT: HCPCS

## 2019-08-29 RX ORDER — FAMOTIDINE 10 MG/ML
20 INJECTION, SOLUTION INTRAVENOUS AS NEEDED
Status: CANCELLED | OUTPATIENT
Start: 2019-09-12

## 2019-08-29 RX ORDER — ENALAPRILAT 2.5 MG/2ML
1.25 INJECTION INTRAVENOUS EVERY 6 HOURS
Status: CANCELLED
Start: 2019-09-12

## 2019-08-29 RX ORDER — SODIUM CHLORIDE 9 MG/ML
250 INJECTION, SOLUTION INTRAVENOUS ONCE
Status: CANCELLED | OUTPATIENT
Start: 2019-09-12

## 2019-08-29 RX ORDER — FAMOTIDINE 10 MG/ML
20 INJECTION, SOLUTION INTRAVENOUS ONCE
Status: CANCELLED | OUTPATIENT
Start: 2019-09-12

## 2019-08-29 RX ORDER — GABAPENTIN 100 MG/1
100 CAPSULE ORAL 4 TIMES DAILY
Qty: 120 CAPSULE | Refills: 0 | Status: SHIPPED | OUTPATIENT
Start: 2019-08-29 | End: 2019-09-24 | Stop reason: SDUPTHER

## 2019-08-29 RX ORDER — PALONOSETRON 0.05 MG/ML
0.25 INJECTION, SOLUTION INTRAVENOUS ONCE
Status: CANCELLED | OUTPATIENT
Start: 2019-09-12

## 2019-08-29 RX ORDER — DIPHENHYDRAMINE HYDROCHLORIDE 50 MG/ML
50 INJECTION INTRAMUSCULAR; INTRAVENOUS AS NEEDED
Status: CANCELLED | OUTPATIENT
Start: 2019-09-12

## 2019-08-29 NOTE — TELEPHONE ENCOUNTER
Pt continues to have pain in toes and feet that radiates up her legs to her knees.  She has numbness and tingling in her toes.  She rates pain an 8 (0 - 10) with new pain med and continuing ibuprofen and acetaminophen.

## 2019-08-30 RX ORDER — OXYCODONE HYDROCHLORIDE 5 MG/1
5 TABLET ORAL EVERY 6 HOURS PRN
Qty: 20 TABLET | Refills: 0 | Status: SHIPPED | OUTPATIENT
Start: 2019-08-30 | End: 2019-11-21 | Stop reason: SDUPTHER

## 2019-09-03 ENCOUNTER — TELEPHONE (OUTPATIENT)
Dept: GYNECOLOGIC ONCOLOGY | Facility: CLINIC | Age: 69
End: 2019-09-03

## 2019-09-03 ENCOUNTER — LAB (OUTPATIENT)
Dept: LAB | Facility: HOSPITAL | Age: 69
End: 2019-09-03

## 2019-09-03 DIAGNOSIS — C54.1 PAPILLARY SEROUS ADENOCARCINOMA OF ENDOMETRIUM (HCC): ICD-10-CM

## 2019-09-03 LAB — CANCER AG125 SERPL QL: 115.4 U/ML (ref 0–38.1)

## 2019-09-03 PROCEDURE — 36415 COLL VENOUS BLD VENIPUNCTURE: CPT

## 2019-09-03 PROCEDURE — 86304 IMMUNOASSAY TUMOR CA 125: CPT

## 2019-09-03 NOTE — TELEPHONE ENCOUNTER
Pt called todayand left message for me to call her back.  I returned her call.  She is feeling much better.  She is no longer having any pain.  She wanted to know if it is ok to continue taking gabapentin and stop taking her oxycodone.  She hasn't had any oxycodone today and feels well.  I told her she only needs to take oxycodone when she is having pain not controlled with gabapentin and rotating ibuprofen and tylenol.  She verbalized understanding.

## 2019-09-04 ENCOUNTER — LAB (OUTPATIENT)
Dept: LAB | Facility: HOSPITAL | Age: 69
End: 2019-09-04

## 2019-09-04 ENCOUNTER — TELEPHONE (OUTPATIENT)
Dept: GYNECOLOGIC ONCOLOGY | Facility: CLINIC | Age: 69
End: 2019-09-04

## 2019-09-04 DIAGNOSIS — C54.1 PAPILLARY SEROUS ADENOCARCINOMA OF ENDOMETRIUM (HCC): ICD-10-CM

## 2019-09-04 LAB
ALBUMIN SERPL-MCNC: 4.14 G/DL (ref 3.5–5.2)
ALBUMIN/GLOB SERPL: 1.6 G/DL
ALP SERPL-CCNC: 72 U/L (ref 39–117)
ALT SERPL W P-5'-P-CCNC: 29 U/L (ref 1–33)
ANION GAP SERPL CALCULATED.3IONS-SCNC: 14 MMOL/L (ref 5–15)
ANISOCYTOSIS BLD QL: NORMAL
AST SERPL-CCNC: 22 U/L (ref 1–32)
BASOPHILS # BLD AUTO: 0.02 10*3/MM3 (ref 0–0.2)
BASOPHILS NFR BLD AUTO: 0.5 % (ref 0–1.5)
BILIRUB SERPL-MCNC: 0.2 MG/DL (ref 0.2–1.2)
BUN BLD-MCNC: 14 MG/DL (ref 8–23)
BUN/CREAT SERPL: 18.7 (ref 7–25)
CALCIUM SPEC-SCNC: 9.3 MG/DL (ref 8.6–10.5)
CHLORIDE SERPL-SCNC: 97 MMOL/L (ref 98–107)
CO2 SERPL-SCNC: 27 MMOL/L (ref 22–29)
CREAT BLD-MCNC: 0.75 MG/DL (ref 0.57–1)
DEPRECATED RDW RBC AUTO: 71.4 FL (ref 37–54)
EOSINOPHIL # BLD AUTO: 0.12 10*3/MM3 (ref 0–0.4)
EOSINOPHIL NFR BLD AUTO: 3 % (ref 0.3–6.2)
ERYTHROCYTE [DISTWIDTH] IN BLOOD BY AUTOMATED COUNT: 25.9 % (ref 12.3–15.4)
GFR SERPL CREATININE-BSD FRML MDRD: 77 ML/MIN/1.73
GLOBULIN UR ELPH-MCNC: 2.7 GM/DL
GLUCOSE BLD-MCNC: 172 MG/DL (ref 65–99)
HCT VFR BLD AUTO: 33.4 % (ref 34–46.6)
HGB BLD-MCNC: 10.3 G/DL (ref 12–15.9)
HYPOCHROMIA BLD QL: NORMAL
IMM GRANULOCYTES # BLD AUTO: 0 10*3/MM3 (ref 0–0.05)
IMM GRANULOCYTES NFR BLD AUTO: 0 % (ref 0–0.5)
LYMPHOCYTES # BLD AUTO: 1.49 10*3/MM3 (ref 0.7–3.1)
LYMPHOCYTES NFR BLD AUTO: 37.1 % (ref 19.6–45.3)
MCH RBC QN AUTO: 23.8 PG (ref 26.6–33)
MCHC RBC AUTO-ENTMCNC: 30.8 G/DL (ref 31.5–35.7)
MCV RBC AUTO: 77.1 FL (ref 79–97)
MICROCYTES BLD QL: NORMAL
MONOCYTES # BLD AUTO: 0.43 10*3/MM3 (ref 0.1–0.9)
MONOCYTES NFR BLD AUTO: 10.7 % (ref 5–12)
NEUTROPHILS # BLD AUTO: 1.96 10*3/MM3 (ref 1.7–7)
NEUTROPHILS NFR BLD AUTO: 48.7 % (ref 42.7–76)
OVALOCYTES BLD QL SMEAR: NORMAL
PLATELET # BLD AUTO: 192 10*3/MM3 (ref 140–450)
PMV BLD AUTO: 9.4 FL (ref 6–12)
POTASSIUM BLD-SCNC: 4.3 MMOL/L (ref 3.5–5.2)
PROT SERPL-MCNC: 6.8 G/DL (ref 6–8.5)
RBC # BLD AUTO: 4.33 10*6/MM3 (ref 3.77–5.28)
SMALL PLATELETS BLD QL SMEAR: ADEQUATE
SODIUM BLD-SCNC: 138 MMOL/L (ref 136–145)
WBC NRBC COR # BLD: 4.02 10*3/MM3 (ref 3.4–10.8)

## 2019-09-04 PROCEDURE — 85007 BL SMEAR W/DIFF WBC COUNT: CPT

## 2019-09-04 PROCEDURE — 85025 COMPLETE CBC W/AUTO DIFF WBC: CPT

## 2019-09-04 PROCEDURE — 36415 COLL VENOUS BLD VENIPUNCTURE: CPT

## 2019-09-04 PROCEDURE — 80053 COMPREHEN METABOLIC PANEL: CPT

## 2019-09-04 NOTE — TELEPHONE ENCOUNTER
I called OL[facility] Pabon after reviewing pt's labs. Pt is in ying period and was to have a cbc & diff drawn but instead only a ca-125 was drawn.  I spoke with Will in the lab.  He is unsure why the cbc & diff was missed.  He agreed to make sure pt is contacted today and informed of mistake and that she needs to come back in for redraw.

## 2019-09-05 ENCOUNTER — HOSPITAL ENCOUNTER (OUTPATIENT)
Dept: INFUSION THERAPY | Facility: HOSPITAL | Age: 69
Setting detail: INFUSION SERIES
Discharge: HOME | End: 2019-09-05

## 2019-09-05 VITALS — HEART RATE: 67 BPM | DIASTOLIC BLOOD PRESSURE: 98 MMHG | RESPIRATION RATE: 18 BRPM | SYSTOLIC BLOOD PRESSURE: 183 MMHG

## 2019-09-05 DIAGNOSIS — C54.1 ENDOMETRIAL CANCER (HCC): Primary | ICD-10-CM

## 2019-09-05 PROCEDURE — G0463 HOSPITAL OUTPT CLINIC VISIT: HCPCS

## 2019-09-12 ENCOUNTER — HOSPITAL ENCOUNTER (OUTPATIENT)
Dept: ONCOLOGY | Facility: HOSPITAL | Age: 69
Setting detail: INFUSION SERIES
Discharge: HOME | End: 2019-09-12

## 2019-09-12 ENCOUNTER — OFFICE VISIT (OUTPATIENT)
Dept: GYNECOLOGIC ONCOLOGY | Facility: CLINIC | Age: 69
End: 2019-09-12

## 2019-09-12 ENCOUNTER — DOCUMENTATION (OUTPATIENT)
Dept: NUTRITION | Facility: HOSPITAL | Age: 69
End: 2019-09-12

## 2019-09-12 VITALS
WEIGHT: 224 LBS | RESPIRATION RATE: 16 BRPM | DIASTOLIC BLOOD PRESSURE: 86 MMHG | OXYGEN SATURATION: 98 % | BODY MASS INDEX: 33.06 KG/M2 | TEMPERATURE: 97.6 F | SYSTOLIC BLOOD PRESSURE: 188 MMHG | HEART RATE: 75 BPM

## 2019-09-12 VITALS — SYSTOLIC BLOOD PRESSURE: 141 MMHG | HEART RATE: 67 BPM | DIASTOLIC BLOOD PRESSURE: 72 MMHG

## 2019-09-12 DIAGNOSIS — Z45.2: ICD-10-CM

## 2019-09-12 DIAGNOSIS — C54.1 PAPILLARY SEROUS ADENOCARCINOMA OF ENDOMETRIUM (HCC): Primary | ICD-10-CM

## 2019-09-12 DIAGNOSIS — T45.1X5A NEUROPATHY DUE TO CHEMOTHERAPEUTIC DRUG (HCC): ICD-10-CM

## 2019-09-12 DIAGNOSIS — G62.0 NEUROPATHY DUE TO CHEMOTHERAPEUTIC DRUG (HCC): ICD-10-CM

## 2019-09-12 LAB
ALBUMIN SERPL-MCNC: 4.7 G/DL (ref 3.5–5.2)
ALBUMIN/GLOB SERPL: 1.8 G/DL
ALP SERPL-CCNC: 74 U/L (ref 39–117)
ALT SERPL W P-5'-P-CCNC: 19 U/L (ref 1–33)
ANION GAP SERPL CALCULATED.3IONS-SCNC: 14 MMOL/L (ref 5–15)
AST SERPL-CCNC: 17 U/L (ref 1–32)
BILIRUB SERPL-MCNC: 0.2 MG/DL (ref 0.2–1.2)
BUN BLD-MCNC: 14 MG/DL (ref 8–23)
BUN/CREAT SERPL: 24.1 (ref 7–25)
CALCIUM SPEC-SCNC: 9.7 MG/DL (ref 8.6–10.5)
CHLORIDE SERPL-SCNC: 95 MMOL/L (ref 98–107)
CO2 SERPL-SCNC: 25 MMOL/L (ref 22–29)
CREAT BLD-MCNC: 0.58 MG/DL (ref 0.57–1)
CREAT BLDA-MCNC: 0.5 MG/DL (ref 0.6–1.3)
CREAT SERPL-MCNC: 0.5 MG/DL
ERYTHROCYTE [DISTWIDTH] IN BLOOD BY AUTOMATED COUNT: 28.4 % (ref 12.3–15.4)
GFR SERPL CREATININE-BSD FRML MDRD: 103 ML/MIN/1.73
GLOBULIN UR ELPH-MCNC: 2.6 GM/DL
GLUCOSE BLD-MCNC: 104 MG/DL (ref 65–99)
HCT VFR BLD AUTO: 32.7 % (ref 34–46.6)
HGB BLD-MCNC: 10.3 G/DL (ref 12–15.9)
LYMPHOCYTES # BLD AUTO: 1.7 10*3/MM3 (ref 0.7–3.1)
LYMPHOCYTES NFR BLD AUTO: 19.1 % (ref 19.6–45.3)
MCH RBC QN AUTO: 24.6 PG (ref 26.6–33)
MCHC RBC AUTO-ENTMCNC: 31.4 G/DL (ref 31.5–35.7)
MCV RBC AUTO: 78.6 FL (ref 79–97)
MONOCYTES # BLD AUTO: 0.3 10*3/MM3 (ref 0.1–0.9)
MONOCYTES NFR BLD AUTO: 3.7 % (ref 5–12)
NEUTROPHILS # BLD AUTO: 6.9 10*3/MM3 (ref 1.7–7)
NEUTROPHILS NFR BLD AUTO: 77.2 % (ref 42.7–76)
PLATELET # BLD AUTO: 252 10*3/MM3 (ref 140–450)
PMV BLD AUTO: 6.6 FL (ref 6–12)
POTASSIUM BLD-SCNC: 4.3 MMOL/L (ref 3.5–5.2)
PROT SERPL-MCNC: 7.3 G/DL (ref 6–8.5)
RBC # BLD AUTO: 4.16 10*6/MM3 (ref 3.77–5.28)
SODIUM BLD-SCNC: 134 MMOL/L (ref 136–145)
WBC NRBC COR # BLD: 9 10*3/MM3 (ref 3.4–10.8)

## 2019-09-12 PROCEDURE — 80053 COMPREHEN METABOLIC PANEL: CPT | Performed by: OBSTETRICS & GYNECOLOGY

## 2019-09-12 PROCEDURE — 85025 COMPLETE CBC W/AUTO DIFF WBC: CPT | Performed by: OBSTETRICS & GYNECOLOGY

## 2019-09-12 PROCEDURE — 25010000002 PACLITAXEL PER 30 MG: Performed by: OBSTETRICS & GYNECOLOGY

## 2019-09-12 PROCEDURE — 25010000002 PALONOSETRON 0.25 MG/5ML SOLUTION PREFILLED SYRINGE: Performed by: OBSTETRICS & GYNECOLOGY

## 2019-09-12 PROCEDURE — 99214 OFFICE O/P EST MOD 30 MIN: CPT | Performed by: OBSTETRICS & GYNECOLOGY

## 2019-09-12 PROCEDURE — 96367 TX/PROPH/DG ADDL SEQ IV INF: CPT

## 2019-09-12 PROCEDURE — 96413 CHEMO IV INFUSION 1 HR: CPT

## 2019-09-12 PROCEDURE — 25010000002 DEXAMETHASONE PER 1 MG: Performed by: OBSTETRICS & GYNECOLOGY

## 2019-09-12 PROCEDURE — 25010000002 DIPHENHYDRAMINE PER 50 MG: Performed by: OBSTETRICS & GYNECOLOGY

## 2019-09-12 PROCEDURE — 25010000002 CARBOPLATIN PER 50 MG: Performed by: OBSTETRICS & GYNECOLOGY

## 2019-09-12 PROCEDURE — 96417 CHEMO IV INFUS EACH ADDL SEQ: CPT

## 2019-09-12 PROCEDURE — 96415 CHEMO IV INFUSION ADDL HR: CPT

## 2019-09-12 PROCEDURE — 96375 TX/PRO/DX INJ NEW DRUG ADDON: CPT

## 2019-09-12 PROCEDURE — 96368 THER/DIAG CONCURRENT INF: CPT

## 2019-09-12 PROCEDURE — 82565 ASSAY OF CREATININE: CPT

## 2019-09-12 PROCEDURE — 25010000002 FOSAPREPITANT PER 1 MG: Performed by: OBSTETRICS & GYNECOLOGY

## 2019-09-12 RX ORDER — ISOSORBIDE MONONITRATE 30 MG/1
30 TABLET, EXTENDED RELEASE ORAL DAILY
COMMUNITY

## 2019-09-12 RX ORDER — FAMOTIDINE 10 MG/ML
20 INJECTION, SOLUTION INTRAVENOUS ONCE
Status: COMPLETED | OUTPATIENT
Start: 2019-09-12 | End: 2019-09-12

## 2019-09-12 RX ORDER — PALONOSETRON 0.05 MG/ML
0.25 INJECTION, SOLUTION INTRAVENOUS ONCE
Status: COMPLETED | OUTPATIENT
Start: 2019-09-12 | End: 2019-09-12

## 2019-09-12 RX ORDER — SODIUM CHLORIDE 9 MG/ML
250 INJECTION, SOLUTION INTRAVENOUS ONCE
Status: COMPLETED | OUTPATIENT
Start: 2019-09-12 | End: 2019-09-12

## 2019-09-12 RX ADMIN — FAMOTIDINE 20 MG: 10 INJECTION, SOLUTION INTRAVENOUS at 11:08

## 2019-09-12 RX ADMIN — PALONOSETRON 0.25 MG: 0.25 INJECTION, SOLUTION INTRAVENOUS at 11:07

## 2019-09-12 RX ADMIN — CARBOPLATIN 650 MG: 10 INJECTION, SOLUTION INTRAVENOUS at 14:46

## 2019-09-12 RX ADMIN — PACLITAXEL 340 MG: 6 INJECTION INTRAVENOUS at 11:45

## 2019-09-12 RX ADMIN — DIPHENHYDRAMINE HYDROCHLORIDE 50 MG: 50 INJECTION INTRAMUSCULAR; INTRAVENOUS at 11:10

## 2019-09-12 RX ADMIN — SODIUM CHLORIDE 150 MG: 9 INJECTION, SOLUTION INTRAVENOUS at 11:11

## 2019-09-12 RX ADMIN — SODIUM CHLORIDE 250 ML: 9 INJECTION, SOLUTION INTRAVENOUS at 11:42

## 2019-09-12 RX ADMIN — DEXAMETHASONE SODIUM PHOSPHATE 20 MG: 10 INJECTION INTRAMUSCULAR; INTRAVENOUS at 11:11

## 2019-09-12 NOTE — PROGRESS NOTES
Hailey Osborn  [id number]  1950      Reason for visit: Serous endometrial cancer, chemotherapy-induced neuropathy, consideration of chemotherapy    History of present illness:  The patient is a 69 y.o. year old female who presents today for treatment and evaluation of the above issues.    Today, patient notes severe pain with her last chemotherapy cycle.  She notes that her pain in her extremities completely resolved with addition of gabapentin.  However, she could not walk the pain was so severe.  The pain was unresponsive to narcotic medications.  She has been seen Dr. Becker for radiation.  She notes good bowel and bladder function.  She notes good appetite and energy.  She is tolerating p.o. Well.  Of note, she had an echocardiogram in [place] and subsequent stress test which per her report were negative.    Oncologic History:     Papillary serous adenocarcinoma of endometrium (CMS/HCC)    7/22/2019 Biopsy     Patient presented to gyn, Dr. Patel, with c/o vaginal bleeding x 1 year. EMB revealed high grade papillary serous endometrial cancer.          7/28/2019 -  Other Event     While outpatient Gyn Oncology referral was pending, patient developed heavier vaginal bleeding and symptomatic anemia requiring hospital admission. Inpatient Gyn Oncology consult. Anemia managed with blood transfusions.   CA-125 = 9.8         7/29/2019 Imaging     Pre-op CT negative for chest disease or obvious metastatic disease. Enlarged uterus with underlying mass noted with several small questionable nodes.          8/2/2019 Surgery     RTLH/BSO, omentectomy, and bilateral pelvic lymph node dissection. Final pathology revealed papillary serous adenocarcinoma with invasion into 1.7 cm of 2.5 cm myometrium. No cervical involvement. Tubes, ovaries, omentum, and all nodes negative. No LVSI. MSI normal. Stage IB grade 3    Reflex testing reveals HER2+           8/23/2019 -  Chemotherapy     OP UTERINE PACLitaxel / CARBOplatin  (Q21D)  Dose reduction of carboplatin with cycle #2 due to neuropathy.              Past Medical History:   Diagnosis Date   • Endometrial cancer (CMS/HCC)     Stage IB grade 3 pap serous   • Hyperlipidemia    • Hypertension    • Murmur    • Post-menopausal bleeding    • Pre-diabetes        Past Surgical History:   Procedure Laterality Date   • APPENDECTOMY     •  SECTION     • NODE DISSECTION LAPAROSCOPIC N/A 2019    Procedure: PELVIC LYMPH NODE DISSECTION LAPAROSCOPIC WITH DAVINCI ROBOT BILATERAL;  Surgeon: Lolis Aquino MD;  Location: [facility] OR;  Service: DaVinci   • TOTAL LAPAROSCOPIC HYSTERECTOMY N/A 2019    Procedure: RADICAL TOTAL LAPAROSCOPIC HYSTERECTOMY BILATERAL SALPINGECTOMY WITH DAVINCI ROBOT, OMENTUMECTOMY;  Surgeon: Lolis Aquino MD;  Location: [facility] OR;  Service: DaVinci       MEDICATIONS: The current medication list was reviewed with the patient and updated in the EMR this date per the Medical Assistant. Medication dosages and frequencies were confirmed to be accurate.      Allergies:  is allergic to codeine.    Social History:   Social History     Socioeconomic History   • Marital status:      Spouse name: Benedicto   • Number of children: Not on file   • Years of education: Not on file   • Highest education level: Not on file   Tobacco Use   • Smoking status: Former Smoker     Last attempt to quit:      Years since quittin.7   • Smokeless tobacco: Never Used   • Tobacco comment: quit    Substance and Sexual Activity   • Alcohol use: No     Frequency: Never   • Drug use: No   • Sexual activity: Defer   Social History Narrative    Lives in [facility] with [facility]       Family History:    Family History   Problem Relation Age of Onset   • Pancreatic cancer Brother        Health Maintenance:    Health Maintenance   Topic Date Due   • MAMMOGRAM  1950   • URINE MICROALBUMIN  1950   • TDAP/TD VACCINES (1 - Tdap) 1969   • ZOSTER VACCINE (1 of 2)  08/06/2000   • PNEUMOCOCCAL VACCINES (65+ LOW/MEDIUM RISK) (1 of 2 - PCV13) 08/06/2015   • INFLUENZA VACCINE  08/01/2019   • HEPATITIS C SCREENING  08/04/2019   • MEDICARE ANNUAL WELLNESS  08/04/2019   • DIABETIC FOOT EXAM  08/04/2019   • LIPID PANEL  08/04/2019   • DIABETIC EYE EXAM  08/04/2019   • COLONOSCOPY  08/04/2019   • HEMOGLOBIN A1C  01/29/2020     Review of Systems   Constitutional: Negative for appetite change, chills, fatigue, fever and unexpected weight change.   HENT: Negative for congestion, hearing loss, sore throat and trouble swallowing.    Eyes: Negative for redness and visual disturbance.   Respiratory: Negative for cough, shortness of breath and wheezing.    Cardiovascular: Negative for chest pain, palpitations and leg swelling.   Gastrointestinal: Negative for abdominal distention, abdominal pain, blood in stool, constipation, diarrhea, nausea and vomiting.   Endocrine: Negative.  Negative for cold intolerance and heat intolerance.   Genitourinary: Negative.  Negative for difficulty urinating, dyspareunia, dysuria, frequency, genital sores, hematuria, pelvic pain, urgency, vaginal bleeding, vaginal discharge and vaginal pain.   Musculoskeletal: Negative for arthralgias, back pain, gait problem and joint swelling.   Skin: Negative.  Negative for rash and wound.   Allergic/Immunologic: Negative.  Negative for food allergies and immunocompromised state.   Neurological: Negative for dizziness, seizures, syncope, weakness, light-headedness, numbness and headaches.        Neuropathy with pain manifestation   Hematological: Negative for adenopathy. Does not bruise/bleed easily.   Psychiatric/Behavioral: Negative.  Negative for confusion, dysphoric mood and sleep disturbance. The patient is not nervous/anxious.    All other systems reviewed and are negative.      Physical Exam    Vitals:    09/12/19 0920   BP: (!) 188/86   Pulse: 75   Resp: 16   Temp: 97.6 °F (36.4 °C)   TempSrc: Temporal   SpO2: 98%    Weight: 102 kg (224 lb)   PainSc: 0-No pain     Body mass index is 33.06 kg/m².    Wt Readings from Last 3 Encounters:   09/12/19 102 kg (224 lb)   08/23/19 100 kg (221 lb)   08/22/19 100 kg (221 lb)         GENERAL: Alert, well-appearing female appearing her stated age who is in no apparent distress.   HEENT: Sclera anicteric. Head normocephalic, atraumatic. Mucus membranes moist.  Alopecia  NECK: Trachea midline, supple, without masses.  No thyromegaly.   BREASTS: Deferred  CARDIOVASCULAR: Normal rate, regular rhythm, no murmurs, rubs, or gallops.  No peripheral edema.  RESPIRATORY: Clear to auscultation bilaterally, normal respiratory effort  BACK:  No CVA tenderness, no vertebral tenderness on palpation  GASTROINTESTINAL:  Abdomen is soft, non-tender, non-distended, no rebound or guarding, no masses, or hernias. No HSM.  Incisions well-healed  SKIN:  Warm, dry, well-perfused.  All visible areas intact.  No rashes, lesions, ulcers.  PSYCHIATRIC: AO x3, with appropriate affect, normal thought processes.  NEUROLOGIC: No focal deficits.  Moves extremities well.  MUSCULOSKELETAL: Normal gait and station.   EXTREMITIES:   No cyanosis, clubbing, symmetric.  LYMPHATICS:  No cervical or inguinal adenopathy noted.     PELVIC exam:    GYNECOLOGIC:  External genitalia are free from lesion.  On bimanual examination, no fullness was appreciated.  Uterus, cervix and adnexa were absent.  There was no significant tenderness.  Rectovaginal exam was deferred.    ECOG PS 0    PROCEDURES:  none    Diagnostic Data:      Ct Chest With Contrast    Result Date: 7/31/2019  No evidence of metastatic disease or other active disease in the chest.  ABDOMEN AND PELVIS CT SCAN WITH ORAL AND IV CONTRAST:  There is diffuse fatty liver change. Calcified gallstones are seen in the otherwise normal appearing gallbladder. No significant abnormalities are seen of the spleen, pancreas, adrenal glands, or kidneys. No upper abdominal ascites or acute  inflammatory change is appreciated. No definite adenopathy is seen. There is a single 7 mm right mid abdominal precaval node which should be followed for stability. The bowel loops are normal in caliber and normal in appearance.  Regarding the lower abdomen and pelvis, the uterus is premenopausal in size, mildly enlarged to approximately 7.4 x 6.2 cm in axial diameters. Delayed images appear to show most of the uterus occupied by a rounded 6 cm mass, heterogeneous in appearance consistent with the patient's known neoplasm. The cervix appears grossly normal. The ovaries appear to be atrophic. No definite pelvic sidewall adenopathy is seen. The included inguinal lymph nodes are relatively small, one of these appearing rounded, 11 mm in diameter in the left inguinal region, which probably should be followed for stability.  IMPRESSION: 1. Enlarged heterogeneous uterus, with evidence of underlying central mass, presumably the patient's known neoplasm. 2. No findings particularly suspicious for metastatic disease or other acute disease in the abdomen or pelvis. 3. Rounded 7 mm precaval node, and rounded 11 mm left inguinal lymph node, not definitely abnormal, but which should be followed for stability. 4. Cholelithiasis but no CT scan evidence of cholecystitis. Fatty liver change noted.  D:  07/30/2019 E:  07/30/2019  This report was finalized on 7/31/2019 10:35 PM by DR. Quique Squires MD.      Ct Abdomen Pelvis With Contrast    Result Date: 7/31/2019  No evidence of metastatic disease or other active disease in the chest.  ABDOMEN AND PELVIS CT SCAN WITH ORAL AND IV CONTRAST:  There is diffuse fatty liver change. Calcified gallstones are seen in the otherwise normal appearing gallbladder. No significant abnormalities are seen of the spleen, pancreas, adrenal glands, or kidneys. No upper abdominal ascites or acute inflammatory change is appreciated. No definite adenopathy is seen. There is a single 7 mm right mid abdominal  precaval node which should be followed for stability. The bowel loops are normal in caliber and normal in appearance.  Regarding the lower abdomen and pelvis, the uterus is premenopausal in size, mildly enlarged to approximately 7.4 x 6.2 cm in axial diameters. Delayed images appear to show most of the uterus occupied by a rounded 6 cm mass, heterogeneous in appearance consistent with the patient's known neoplasm. The cervix appears grossly normal. The ovaries appear to be atrophic. No definite pelvic sidewall adenopathy is seen. The included inguinal lymph nodes are relatively small, one of these appearing rounded, 11 mm in diameter in the left inguinal region, which probably should be followed for stability.  IMPRESSION: 1. Enlarged heterogeneous uterus, with evidence of underlying central mass, presumably the patient's known neoplasm. 2. No findings particularly suspicious for metastatic disease or other acute disease in the abdomen or pelvis. 3. Rounded 7 mm precaval node, and rounded 11 mm left inguinal lymph node, not definitely abnormal, but which should be followed for stability. 4. Cholelithiasis but no CT scan evidence of cholecystitis. Fatty liver change noted.  D:  07/30/2019 E:  07/30/2019  This report was finalized on 7/31/2019 10:35 PM by DR. Quique Squires MD.        Lab Results   Component Value Date    WBC 9.00 09/12/2019    HGB 10.3 (L) 09/12/2019    HCT 32.7 (L) 09/12/2019    MCV 78.6 (L) 09/12/2019     09/12/2019    NEUTROABS 6.90 09/12/2019    GLUCOSE 104 (H) 09/12/2019    BUN 14 09/12/2019    CREATININE 0.50 (L) 09/12/2019    EGFRIFNONA 103 09/12/2019     (L) 09/12/2019    K 4.3 09/12/2019    CL 95 (L) 09/12/2019    CO2 25.0 09/12/2019    CALCIUM 9.7 09/12/2019    ALBUMIN 4.70 09/12/2019    AST 17 09/12/2019    ALT 19 09/12/2019    BILITOT 0.2 09/12/2019     Lab Results   Component Value Date     115.4 (H) 09/03/2019     197.3 (H) 08/23/2019     9.8 07/30/2019            Assessment/Plan   This is a 69 y.o. woman with serous endometrial cancer as detailed above.  Consideration of chemotherapy.    Serous endometrial cancer  -Continue combined carboplatin and AUC of 6+ paclitaxel 175 mg/m² IV q. 21 days for total of 6 cycles.  Cycle #2 today.  Proceed pending laboratory evaluation.    Chemotherapy induced neuropathy  -Resolved with gabapentin  -Given the severity of her symptoms after cycle #1, carboplatin was dose reduced to 90%    IV access: PICC line  -Patient to undergo dressing change today.  She gets weekly dressing changes closer to home.    DM/pre DM  -Sliding scale insulin with chemotherapy as needed due to hyperglycemia with steroid effect    Pain assessment was performed today as a part of patient’s care.  For patients with pain related to surgery, gynecologic malignancy or cancer treatment, the plan is as noted in the assessment/plan.  For patients with pain not related to these issues, they are to seek any further needed care from a more appropriate provider, such as PCP.    No orders of the defined types were placed in this encounter.      FOLLOW UP: 3 weeks for consideration of cycle #3 of chemotherapy    Lolis Aquino MD  09/12/19  1:33 PM

## 2019-09-12 NOTE — PROGRESS NOTES
Oncology Nutrition Screening    Patient Name:  Hailey Osborn  YOB: 1950  MRN: [id number]  Date:  09/12/19  Physician:  Dr. Aquino / Dr. Becker    Type of Cancer Treatment:   Surgery: RTLH / BSO / Omentectomy / Bilateral pelvic lymph node dissection (8/2/19)  Radiation/Cyberknife: 30 Gy in 5 fractions  Chemotherapy: Carbo / Taxol - every 21 days x 6 cycles    Patient Active Problem List   Diagnosis   • Microcytic anemia   • Papillary serous adenocarcinoma of endometrium (CMS/HCC)   • HTN (hypertension)   • HLD (hyperlipidemia)   • Prediabetes   • Thrombocytosis (CMS/HCC)   • Heart murmur   • Abnormal mammogram   • Peripherally inserted central venous catheter in situ   • Neuropathy due to chemotherapeutic drug (CMS/HCC)       Current Outpatient Medications   Medication Sig Dispense Refill   • atorvastatin (LIPITOR) 20 MG tablet      • Cholecalciferol (VITAMIN D3 PO) Take 1 capsule by mouth Daily.     • dexamethasone (DECADRON) 4 MG tablet Take 2 tablets in the morning daily on days 2, 3 & 4.  Take with food. 6 tablet 5   • dexamethasone (DECADRON) 4 MG tablet Take 5 tabs at bedtime on the night before chemotherapy 30 tablet 0   • docusate sodium (COLACE) 100 MG capsule Take 1 capsule by mouth 2 (Two) Times a Day. 60 capsule 3   • ferrous sulfate 325 (65 FE) MG tablet Take 325 mg by mouth Daily With Breakfast.     • gabapentin (NEURONTIN) 100 MG capsule Take 1 capsule by mouth 4 (Four) Times a Day. 120 capsule 0   • isosorbide mononitrate (IMDUR) 30 MG 24 hr tablet Take 30 mg by mouth Daily.     • levothyroxine (SYNTHROID, LEVOTHROID) 50 MCG tablet      • lisinopril (PRINIVIL,ZESTRIL) 40 MG tablet      • loratadine (CLARITIN) 10 MG tablet Take 1 tablet by mouth Take As Directed. One tablet the night before chemotherapy, one tablet the morning of chemotherapy 12 tablet 0   • metFORMIN (GLUCOPHAGE) 1000 MG tablet 2 (Two) Times a Day.     • metoprolol tartrate (LOPRESSOR) 25 MG tablet Take 50 mg by mouth  2 (Two) Times a Day.     • Multiple Vitamins-Minerals (MULTIVITAMIN PO) Take 1 tablet by mouth Daily.     • Omega-3 Fatty Acids (FISH OIL PO) Take 1 capsule by mouth 2 (Two) Times a Day.     • ondansetron (ZOFRAN) 8 MG tablet Take 1 tablet by mouth 3 (Three) Times a Day As Needed for Nausea or Vomiting. 30 tablet 5   • oxyCODONE (ROXICODONE) 5 MG immediate release tablet Take 1 tablet by mouth Every 6 (Six) Hours As Needed for Severe Pain . 20 tablet 0   • promethazine (PHENERGAN) 25 MG tablet Take 1 tablet by mouth Every 6 (Six) Hours As Needed for Nausea or Vomiting. 30 tablet 5     No current facility-administered medications for this visit.      Facility-Administered Medications Ordered in Other Visits   Medication Dose Route Frequency Provider Last Rate Last Dose   • CARBOplatin (PARAPLATIN) 650 mg in sodium chloride 0.9 % 340 mL chemo IVPB  650 mg Intravenous Once Lolis Aquino MD       • PACLitaxel (TAXOL) 340 mg in sodium chloride 0.9 % 606.7 mL chemo IVPB  157.5 mg/m2 (Treatment Plan Recorded) Intravenous Once Lolis Aquino  mL/hr at 09/12/19 1145 340 mg at 09/12/19 1145       Glycemic Risk:   Steriods    Weight:   Height: 69 inches  Weight: 224 lbs.  Usual Body Weight: ~235 lbs.   BMI: 33.1  Obese  Weight - patient reports intentional weight loss of ~10# x ~9 months; 4.7% weight loss    Oral Food Intake:  Special Diet Restrictions: pateint reports following a heart healthy and low sugar diet    Hydration Status:   How many 8 ounce glass of water of fluid do you drink per day?  ~12    Enteral Feeding:   n/a    Nutrition Symptoms:   Mouth Sores  Constipation  Diarrhea    Activity:   Not my normal self, but able to be up and about with fairly normal activities     reports that she quit smoking about 39 years ago. She has never used smokeless tobacco. She reports that she does not drink alcohol or use drugs.    Evaluation of Nutritional Risk:   Patient has been identified at nutritional risk  "due to diagnosis, treatment plan, and nutrition impact symptoms.  Met with patient and her daughter during her chemotherapy infusion appointment.  She reports tolerating her 1st cycle of chemo pretty well overall.  She reports having 2 mouth sores which have resolved and she complains of going between diarrhea and constipation.  She also reports weight loss as above which by making healthy food chooses and decreasing portion sizes.    Discussed her history of diabetes.  Provided and reviewed written diet material \"Blood Glucose Management\" and reviewed diabetes diet basics.    Also discussed the importance of good nutrition during her treatment course focusing on adequate calorie, protein, nutrient and fluid intake.  Advised her to be consuming smaller more frequent meals/snacks throughout the day to aid with potential nausea management.  Emphasized the importance of protein and its role in the diet; reviewed high protein foods; and recommended she have a protein source at each meal/snack.  Also emphasized the importance of hydration; reviewed good hydrating fluid options; and recommended she drink at least 64 ounces daily.  Discussed bowel regularity and the role of fiber in our diet and provided written diet material to reinforce information discussed.    Answered their questions and both voiced understanding of information discussed.  RD's contact information provided and encouraged to call with questions.  Will monitor as needed during her treatment course.    Electronically signed by:  Tegan Hicks RD  2:29 PM  "

## 2019-09-16 ENCOUNTER — HOSPITAL ENCOUNTER (OUTPATIENT)
Dept: RADIATION ONCOLOGY | Facility: HOSPITAL | Age: 69
Setting detail: RADIATION/ONCOLOGY SERIES
Discharge: HOME | End: 2019-09-16

## 2019-09-16 ENCOUNTER — HOSPITAL ENCOUNTER (OUTPATIENT)
Dept: RADIATION ONCOLOGY | Facility: HOSPITAL | Age: 69
Discharge: HOME | End: 2019-09-16

## 2019-09-16 PROCEDURE — 77470 SPECIAL RADIATION TREATMENT: CPT | Performed by: RADIOLOGY

## 2019-09-16 PROCEDURE — 77290 THER RAD SIMULAJ FIELD CPLX: CPT | Performed by: RADIOLOGY

## 2019-09-17 PROCEDURE — 77316 BRACHYTX ISODOSE PLAN SIMPLE: CPT | Performed by: RADIOLOGY

## 2019-09-18 ENCOUNTER — HOSPITAL ENCOUNTER (OUTPATIENT)
Dept: RADIATION ONCOLOGY | Facility: HOSPITAL | Age: 69
Discharge: HOME | End: 2019-09-18

## 2019-09-18 PROCEDURE — C1717 BRACHYTX, NON-STR,HDR IR-192: HCPCS | Performed by: RADIOLOGY

## 2019-09-18 PROCEDURE — 57156 INS VAG BRACHYTX DEVICE: CPT | Performed by: RADIOLOGY

## 2019-09-18 PROCEDURE — 77770 HDR RDNCL NTRSTL/ICAV BRCHTX: CPT | Performed by: RADIOLOGY

## 2019-09-19 ENCOUNTER — HOSPITAL ENCOUNTER (OUTPATIENT)
Dept: INFUSION THERAPY | Facility: HOSPITAL | Age: 69
Setting detail: INFUSION SERIES
Discharge: HOME | End: 2019-09-19

## 2019-09-19 VITALS
SYSTOLIC BLOOD PRESSURE: 145 MMHG | RESPIRATION RATE: 20 BRPM | HEART RATE: 79 BPM | DIASTOLIC BLOOD PRESSURE: 92 MMHG | TEMPERATURE: 98.6 F

## 2019-09-19 DIAGNOSIS — R92.8 ABNORMAL MAMMOGRAM: ICD-10-CM

## 2019-09-19 PROCEDURE — G0463 HOSPITAL OUTPT CLINIC VISIT: HCPCS

## 2019-09-20 ENCOUNTER — HOSPITAL ENCOUNTER (OUTPATIENT)
Dept: RADIATION ONCOLOGY | Facility: HOSPITAL | Age: 69
Discharge: HOME | End: 2019-09-20

## 2019-09-20 PROCEDURE — 77770 HDR RDNCL NTRSTL/ICAV BRCHTX: CPT | Performed by: RADIOLOGY

## 2019-09-20 PROCEDURE — 57156 INS VAG BRACHYTX DEVICE: CPT | Performed by: RADIOLOGY

## 2019-09-20 PROCEDURE — C1717 BRACHYTX, NON-STR,HDR IR-192: HCPCS | Performed by: RADIOLOGY

## 2019-09-20 NOTE — PROGRESS NOTES
09/20/2019  Hailey Osborn    [facility]   2  Patient presents for cylinder brachytherapy.  She has no complaints of urinary discomfort, diarrhea or vaginal irritation.  The cylinder was inserted and treatment of 6 Gy to the surface of the upper vaginal mucosa was delivered. Patient tolerated procedure well with no complications.  Physics survey was negative with no residual radioactivity.  Discharged to home in good condition.  She will return for next treatment.

## 2019-09-23 ENCOUNTER — TELEPHONE (OUTPATIENT)
Dept: GYNECOLOGIC ONCOLOGY | Facility: CLINIC | Age: 69
End: 2019-09-23

## 2019-09-23 ENCOUNTER — LAB (OUTPATIENT)
Dept: LAB | Facility: HOSPITAL | Age: 69
End: 2019-09-23

## 2019-09-23 DIAGNOSIS — C54.1 PAPILLARY SEROUS ADENOCARCINOMA OF ENDOMETRIUM (HCC): ICD-10-CM

## 2019-09-23 LAB
ANISOCYTOSIS BLD QL: NORMAL
BASOPHILS # BLD AUTO: 0.01 10*3/MM3 (ref 0–0.2)
BASOPHILS NFR BLD AUTO: 0.3 % (ref 0–1.5)
DACRYOCYTES BLD QL SMEAR: NORMAL
DEPRECATED RDW RBC AUTO: 77.2 FL (ref 37–54)
EOSINOPHIL # BLD AUTO: 0.04 10*3/MM3 (ref 0–0.4)
EOSINOPHIL NFR BLD AUTO: 1.1 % (ref 0.3–6.2)
ERYTHROCYTE [DISTWIDTH] IN BLOOD BY AUTOMATED COUNT: 27.2 % (ref 12.3–15.4)
HCT VFR BLD AUTO: 35.4 % (ref 34–46.6)
HGB BLD-MCNC: 10.6 G/DL (ref 12–15.9)
HYPOCHROMIA BLD QL: NORMAL
IMM GRANULOCYTES # BLD AUTO: 0.01 10*3/MM3 (ref 0–0.05)
IMM GRANULOCYTES NFR BLD AUTO: 0.3 % (ref 0–0.5)
LYMPHOCYTES # BLD AUTO: 1.8 10*3/MM3 (ref 0.7–3.1)
LYMPHOCYTES NFR BLD AUTO: 48.3 % (ref 19.6–45.3)
MCH RBC QN AUTO: 24.3 PG (ref 26.6–33)
MCHC RBC AUTO-ENTMCNC: 29.9 G/DL (ref 31.5–35.7)
MCV RBC AUTO: 81 FL (ref 79–97)
MICROCYTES BLD QL: NORMAL
MONOCYTES # BLD AUTO: 0.36 10*3/MM3 (ref 0.1–0.9)
MONOCYTES NFR BLD AUTO: 9.7 % (ref 5–12)
NEUTROPHILS # BLD AUTO: 1.51 10*3/MM3 (ref 1.7–7)
NEUTROPHILS NFR BLD AUTO: 40.3 % (ref 42.7–76)
OVALOCYTES BLD QL SMEAR: NORMAL
PLATELET # BLD AUTO: 174 10*3/MM3 (ref 140–450)
PMV BLD AUTO: 9.6 FL (ref 6–12)
RBC # BLD AUTO: 4.37 10*6/MM3 (ref 3.77–5.28)
SMALL PLATELETS BLD QL SMEAR: ADEQUATE
WBC NRBC COR # BLD: 3.73 10*3/MM3 (ref 3.4–10.8)

## 2019-09-23 PROCEDURE — 36415 COLL VENOUS BLD VENIPUNCTURE: CPT

## 2019-09-23 PROCEDURE — 85007 BL SMEAR W/DIFF WBC COUNT: CPT

## 2019-09-23 PROCEDURE — 85025 COMPLETE CBC W/AUTO DIFF WBC: CPT

## 2019-09-23 NOTE — TELEPHONE ENCOUNTER
I called and informed pt of ying lab results. No interventions needed.  She verbalized understanding. She is feeling ok today but did have a few rough days after chemo, especially with peripheral neuropathy.  Gabapentin is helping but she thinks she may need a higher dose.  She is going to discuss with Dr. Aquino at her pre-chemo visit.

## 2019-09-24 RX ORDER — GABAPENTIN 100 MG/1
CAPSULE ORAL
Qty: 120 CAPSULE | Refills: 0 | Status: SHIPPED | OUTPATIENT
Start: 2019-09-24 | End: 2019-10-03 | Stop reason: DRUGHIGH

## 2019-09-25 ENCOUNTER — HOSPITAL ENCOUNTER (OUTPATIENT)
Dept: RADIATION ONCOLOGY | Facility: HOSPITAL | Age: 69
Discharge: HOME | End: 2019-09-25

## 2019-09-25 PROCEDURE — 57156 INS VAG BRACHYTX DEVICE: CPT | Performed by: RADIOLOGY

## 2019-09-25 PROCEDURE — 77770 HDR RDNCL NTRSTL/ICAV BRCHTX: CPT | Performed by: RADIOLOGY

## 2019-09-25 PROCEDURE — C1717 BRACHYTX, NON-STR,HDR IR-192: HCPCS | Performed by: RADIOLOGY

## 2019-09-26 ENCOUNTER — HOSPITAL ENCOUNTER (OUTPATIENT)
Dept: INFUSION THERAPY | Facility: HOSPITAL | Age: 69
Setting detail: INFUSION SERIES
Discharge: HOME | End: 2019-09-26

## 2019-09-26 DIAGNOSIS — Z45.2: ICD-10-CM

## 2019-09-26 PROCEDURE — G0463 HOSPITAL OUTPT CLINIC VISIT: HCPCS

## 2019-09-27 ENCOUNTER — HOSPITAL ENCOUNTER (OUTPATIENT)
Dept: RADIATION ONCOLOGY | Facility: HOSPITAL | Age: 69
Discharge: HOME | End: 2019-09-27

## 2019-09-27 PROCEDURE — 57156 INS VAG BRACHYTX DEVICE: CPT | Performed by: RADIOLOGY

## 2019-09-27 PROCEDURE — 77770 HDR RDNCL NTRSTL/ICAV BRCHTX: CPT | Performed by: RADIOLOGY

## 2019-09-27 PROCEDURE — C1717 BRACHYTX, NON-STR,HDR IR-192: HCPCS | Performed by: RADIOLOGY

## 2019-09-27 NOTE — PROGRESS NOTES
09/25/2019  Hailey Osborn    [facility]#   3  Patient presents for cylinder brachytherapy.  She has no complaints of urinary discomfort, diarrhea or vaginal irritation.  The cylinder was inserted and treatment of 6 Gy to the surface of the upper vaginal mucosa was delivered. Patient tolerated procedure well with no complications.  Physics survey was negative with no residual radioactivity.  Discharged to home in good condition.  She will return for next treatment.

## 2019-09-27 NOTE — PROGRESS NOTES
Procedure   Procedures    09/27/2019  Hailey Osborn    [facility]#   4  Patient presents for cylinder brachytherapy.  She has no complaints of urinary discomfort, diarrhea or vaginal irritation.  The cylinder was inserted and treatment of 6 Gy to the surface of the upper vaginal mucosa was delivered. Patient tolerated procedure well with no complications.  Physics survey was negative with no residual radioactivity.  Discharged to home in good condition.  She will return for next treatment.

## 2019-09-30 ENCOUNTER — HOSPITAL ENCOUNTER (OUTPATIENT)
Dept: RADIATION ONCOLOGY | Facility: HOSPITAL | Age: 69
Discharge: HOME | End: 2019-09-30

## 2019-09-30 DIAGNOSIS — C54.1 PAPILLARY SEROUS ADENOCARCINOMA OF ENDOMETRIUM (HCC): ICD-10-CM

## 2019-09-30 PROCEDURE — 77770 HDR RDNCL NTRSTL/ICAV BRCHTX: CPT | Performed by: RADIOLOGY

## 2019-09-30 PROCEDURE — 77336 RADIATION PHYSICS CONSULT: CPT | Performed by: RADIOLOGY

## 2019-09-30 PROCEDURE — C1717 BRACHYTX, NON-STR,HDR IR-192: HCPCS | Performed by: RADIOLOGY

## 2019-09-30 PROCEDURE — 57156 INS VAG BRACHYTX DEVICE: CPT | Performed by: RADIOLOGY

## 2019-09-30 RX ORDER — DEXAMETHASONE 4 MG/1
TABLET ORAL
Qty: 11 TABLET | Refills: 3 | Status: SHIPPED | OUTPATIENT
Start: 2019-09-30 | End: 2019-12-26

## 2019-10-03 ENCOUNTER — HOSPITAL ENCOUNTER (OUTPATIENT)
Dept: ONCOLOGY | Facility: HOSPITAL | Age: 69
Setting detail: INFUSION SERIES
Discharge: HOME | End: 2019-10-03

## 2019-10-03 ENCOUNTER — OFFICE VISIT (OUTPATIENT)
Dept: GYNECOLOGIC ONCOLOGY | Facility: CLINIC | Age: 69
End: 2019-10-03

## 2019-10-03 ENCOUNTER — DOCUMENTATION (OUTPATIENT)
Dept: NUTRITION | Facility: HOSPITAL | Age: 69
End: 2019-10-03

## 2019-10-03 VITALS
OXYGEN SATURATION: 96 % | WEIGHT: 223 LBS | SYSTOLIC BLOOD PRESSURE: 190 MMHG | DIASTOLIC BLOOD PRESSURE: 92 MMHG | BODY MASS INDEX: 32.92 KG/M2 | HEART RATE: 80 BPM | TEMPERATURE: 97.3 F | RESPIRATION RATE: 16 BRPM

## 2019-10-03 VITALS — SYSTOLIC BLOOD PRESSURE: 158 MMHG | DIASTOLIC BLOOD PRESSURE: 72 MMHG | HEART RATE: 71 BPM

## 2019-10-03 DIAGNOSIS — C54.1 PAPILLARY SEROUS ADENOCARCINOMA OF ENDOMETRIUM (HCC): Primary | ICD-10-CM

## 2019-10-03 DIAGNOSIS — T45.1X5A NEUROPATHY DUE TO CHEMOTHERAPEUTIC DRUG (HCC): ICD-10-CM

## 2019-10-03 DIAGNOSIS — G62.0 NEUROPATHY DUE TO CHEMOTHERAPEUTIC DRUG (HCC): ICD-10-CM

## 2019-10-03 LAB
ALBUMIN SERPL-MCNC: 4.5 G/DL (ref 3.5–5.2)
ALBUMIN/GLOB SERPL: 1.6 G/DL
ALP SERPL-CCNC: 81 U/L (ref 39–117)
ALT SERPL W P-5'-P-CCNC: 17 U/L (ref 1–33)
ANION GAP SERPL CALCULATED.3IONS-SCNC: 14 MMOL/L (ref 5–15)
AST SERPL-CCNC: 15 U/L (ref 1–32)
BILIRUB SERPL-MCNC: 0.2 MG/DL (ref 0.2–1.2)
BUN BLD-MCNC: 18 MG/DL (ref 8–23)
BUN/CREAT SERPL: 24 (ref 7–25)
CALCIUM SPEC-SCNC: 9.7 MG/DL (ref 8.6–10.5)
CANCER AG125 SERPL QL: 29.5 U/ML (ref 0–38.1)
CHLORIDE SERPL-SCNC: 102 MMOL/L (ref 98–107)
CO2 SERPL-SCNC: 27 MMOL/L (ref 22–29)
CREAT BLD-MCNC: 0.75 MG/DL (ref 0.57–1)
CREAT BLDA-MCNC: 0.8 MG/DL (ref 0.6–1.3)
ERYTHROCYTE [DISTWIDTH] IN BLOOD BY AUTOMATED COUNT: 29.2 % (ref 12.3–15.4)
GFR SERPL CREATININE-BSD FRML MDRD: 77 ML/MIN/1.73
GLOBULIN UR ELPH-MCNC: 2.8 GM/DL
GLUCOSE BLD-MCNC: 179 MG/DL (ref 65–99)
GLUCOSE BLDC GLUCOMTR-MCNC: 183 MG/DL (ref 70–130)
HCT VFR BLD AUTO: 33.4 % (ref 34–46.6)
HGB BLD-MCNC: 10.5 G/DL (ref 12–15.9)
LYMPHOCYTES # BLD AUTO: 1.2 10*3/MM3 (ref 0.7–3.1)
LYMPHOCYTES NFR BLD AUTO: 16.2 % (ref 19.6–45.3)
MCH RBC QN AUTO: 26.1 PG (ref 26.6–33)
MCHC RBC AUTO-ENTMCNC: 31.5 G/DL (ref 31.5–35.7)
MCV RBC AUTO: 82.7 FL (ref 79–97)
MONOCYTES # BLD AUTO: 0.2 10*3/MM3 (ref 0.1–0.9)
MONOCYTES NFR BLD AUTO: 2.6 % (ref 5–12)
NEUTROPHILS # BLD AUTO: 6.2 10*3/MM3 (ref 1.7–7)
NEUTROPHILS NFR BLD AUTO: 81.2 % (ref 42.7–76)
PLATELET # BLD AUTO: 301 10*3/MM3 (ref 140–450)
PMV BLD AUTO: 6.1 FL (ref 6–12)
POTASSIUM BLD-SCNC: 4.1 MMOL/L (ref 3.5–5.2)
PROT SERPL-MCNC: 7.3 G/DL (ref 6–8.5)
RBC # BLD AUTO: 4.03 10*6/MM3 (ref 3.77–5.28)
SODIUM BLD-SCNC: 143 MMOL/L (ref 136–145)
WBC NRBC COR # BLD: 7.6 10*3/MM3 (ref 3.4–10.8)

## 2019-10-03 PROCEDURE — 80053 COMPREHEN METABOLIC PANEL: CPT | Performed by: NURSE PRACTITIONER

## 2019-10-03 PROCEDURE — 96417 CHEMO IV INFUS EACH ADDL SEQ: CPT

## 2019-10-03 PROCEDURE — 96375 TX/PRO/DX INJ NEW DRUG ADDON: CPT

## 2019-10-03 PROCEDURE — 25010000002 DIPHENHYDRAMINE PER 50 MG: Performed by: NURSE PRACTITIONER

## 2019-10-03 PROCEDURE — 82565 ASSAY OF CREATININE: CPT

## 2019-10-03 PROCEDURE — 63710000001 INSULIN LISPRO (HUMAN) PER 5 UNITS: Performed by: NURSE PRACTITIONER

## 2019-10-03 PROCEDURE — 25010000002 DEXAMETHASONE PER 1 MG: Performed by: NURSE PRACTITIONER

## 2019-10-03 PROCEDURE — 82962 GLUCOSE BLOOD TEST: CPT

## 2019-10-03 PROCEDURE — 25010000002 PACLITAXEL PER 30 MG: Performed by: NURSE PRACTITIONER

## 2019-10-03 PROCEDURE — 96413 CHEMO IV INFUSION 1 HR: CPT

## 2019-10-03 PROCEDURE — 85025 COMPLETE CBC W/AUTO DIFF WBC: CPT | Performed by: NURSE PRACTITIONER

## 2019-10-03 PROCEDURE — 99214 OFFICE O/P EST MOD 30 MIN: CPT | Performed by: NURSE PRACTITIONER

## 2019-10-03 PROCEDURE — 25010000002 FOSAPREPITANT PER 1 MG: Performed by: NURSE PRACTITIONER

## 2019-10-03 PROCEDURE — 25010000002 PALONOSETRON 0.25 MG/5ML SOLUTION PREFILLED SYRINGE: Performed by: NURSE PRACTITIONER

## 2019-10-03 PROCEDURE — 25010000002 CARBOPLATIN PER 50 MG: Performed by: NURSE PRACTITIONER

## 2019-10-03 PROCEDURE — 96374 THER/PROPH/DIAG INJ IV PUSH: CPT

## 2019-10-03 PROCEDURE — 86304 IMMUNOASSAY TUMOR CA 125: CPT | Performed by: NURSE PRACTITIONER

## 2019-10-03 PROCEDURE — 96367 TX/PROPH/DG ADDL SEQ IV INF: CPT

## 2019-10-03 PROCEDURE — 96415 CHEMO IV INFUSION ADDL HR: CPT

## 2019-10-03 RX ORDER — SODIUM CHLORIDE 9 MG/ML
250 INJECTION, SOLUTION INTRAVENOUS ONCE
Status: CANCELLED | OUTPATIENT
Start: 2019-10-03

## 2019-10-03 RX ORDER — ENALAPRILAT 2.5 MG/2ML
1.25 INJECTION INTRAVENOUS EVERY 6 HOURS
Status: CANCELLED
Start: 2019-10-03

## 2019-10-03 RX ORDER — PALONOSETRON 0.05 MG/ML
0.25 INJECTION, SOLUTION INTRAVENOUS ONCE
Status: COMPLETED | OUTPATIENT
Start: 2019-10-03 | End: 2019-10-03

## 2019-10-03 RX ORDER — GABAPENTIN 300 MG/1
300 CAPSULE ORAL 2 TIMES DAILY
Qty: 60 CAPSULE | Refills: 0 | Status: SHIPPED | OUTPATIENT
Start: 2019-10-03 | End: 2019-10-24 | Stop reason: SDUPTHER

## 2019-10-03 RX ORDER — FAMOTIDINE 10 MG/ML
20 INJECTION, SOLUTION INTRAVENOUS AS NEEDED
Status: CANCELLED | OUTPATIENT
Start: 2019-10-03

## 2019-10-03 RX ORDER — SODIUM CHLORIDE 9 MG/ML
250 INJECTION, SOLUTION INTRAVENOUS ONCE
Status: DISCONTINUED | OUTPATIENT
Start: 2019-10-03 | End: 2019-10-04 | Stop reason: HOSPADM

## 2019-10-03 RX ORDER — FAMOTIDINE 10 MG/ML
20 INJECTION, SOLUTION INTRAVENOUS ONCE
Status: COMPLETED | OUTPATIENT
Start: 2019-10-03 | End: 2019-10-03

## 2019-10-03 RX ORDER — ENALAPRILAT 2.5 MG/2ML
1.25 INJECTION INTRAVENOUS EVERY 6 HOURS
Status: DISCONTINUED | OUTPATIENT
Start: 2019-10-03 | End: 2019-10-04 | Stop reason: HOSPADM

## 2019-10-03 RX ORDER — PALONOSETRON 0.05 MG/ML
0.25 INJECTION, SOLUTION INTRAVENOUS ONCE
Status: CANCELLED | OUTPATIENT
Start: 2019-10-03

## 2019-10-03 RX ORDER — DIPHENHYDRAMINE HYDROCHLORIDE 50 MG/ML
50 INJECTION INTRAMUSCULAR; INTRAVENOUS AS NEEDED
Status: CANCELLED | OUTPATIENT
Start: 2019-10-03

## 2019-10-03 RX ORDER — FAMOTIDINE 10 MG/ML
20 INJECTION, SOLUTION INTRAVENOUS ONCE
Status: CANCELLED | OUTPATIENT
Start: 2019-10-03

## 2019-10-03 RX ADMIN — SODIUM CHLORIDE 150 MG: 9 INJECTION, SOLUTION INTRAVENOUS at 11:09

## 2019-10-03 RX ADMIN — FAMOTIDINE 20 MG: 10 INJECTION, SOLUTION INTRAVENOUS at 11:09

## 2019-10-03 RX ADMIN — INSULIN LISPRO 2 UNITS: 100 INJECTION, SOLUTION INTRAVENOUS; SUBCUTANEOUS at 12:44

## 2019-10-03 RX ADMIN — PALONOSETRON 0.25 MG: 0.25 INJECTION, SOLUTION INTRAVENOUS at 11:09

## 2019-10-03 RX ADMIN — SODIUM CHLORIDE 300 MG: 9 INJECTION, SOLUTION INTRAVENOUS at 11:48

## 2019-10-03 RX ADMIN — CARBOPLATIN 650 MG: 10 INJECTION, SOLUTION INTRAVENOUS at 14:47

## 2019-10-03 RX ADMIN — DIPHENHYDRAMINE HYDROCHLORIDE 50 MG: 50 INJECTION INTRAMUSCULAR; INTRAVENOUS at 11:32

## 2019-10-03 RX ADMIN — DEXAMETHASONE SODIUM PHOSPHATE 20 MG: 4 INJECTION, SOLUTION INTRAMUSCULAR; INTRAVENOUS at 11:09

## 2019-10-03 NOTE — PROGRESS NOTES
GYN ONCOLOGY FOLLOW-UP    Hailey Osborn  [id number]  1950    Chief Complaint: Follow-up (worsening neuropathy)        History of present illness:  Hailey Osborn is a 69 y.o. year old female who is here today for serous endometrial cancer, chemotherapy toxicity assessment, and consideration of ongoing treatment. She is scheduled for cycle #3 of carboplatin and paclitaxel IV q21 days, pending labs. Patient experienced severe leg pain following cycle #1, improved with gabapentin, and carboplatin was reduced to 90% at cycle #2. Patient reports neuropathy continues to be very bothersome despite these interventions, most severe during the ying week. This leg pain limits her activity level and she has some numbness in bilateral feet. Otherwise, she notes good appetite and energy level. Nausea is very mild and tolerable with home medications, denies vomiting. Bowels and bladder are working normally. She has completed vaginal brachytherapy with Dr. Becker on 9/30/2019, tolerated well.    Oncology History:       Papillary serous adenocarcinoma of endometrium (CMS/HCC)    7/22/2019 Biopsy     Patient presented to gyn, Dr. Patel, with c/o vaginal bleeding x 1 year. EMB revealed high grade papillary serous endometrial cancer.          7/28/2019 -  Other Event     While outpatient Gyn Oncology referral was pending, patient developed heavier vaginal bleeding and symptomatic anemia requiring hospital admission. Inpatient Gyn Oncology consult. Anemia managed with blood transfusions.   CA-125 = 9.8         7/29/2019 Imaging     Pre-op CT negative for chest disease or obvious metastatic disease. Enlarged uterus with underlying mass noted with several small questionable nodes.          8/2/2019 Surgery     RTLH/BSO, omentectomy, and bilateral pelvic lymph node dissection. Final pathology revealed papillary serous adenocarcinoma with invasion into 1.7 cm of 2.5 cm myometrium. No cervical involvement. Tubes, ovaries, omentum, and  all nodes negative. No LVSI. MSI normal. Stage IB grade 3    Reflex testing reveals HER2+           2019 -  Chemotherapy     OP UTERINE PACLitaxel / CARBOplatin (Q21D)  Dose reduction of carboplatin with cycle #2 due to neuropathy.         2019 - 2019 Radiation     Radiation OncologyTreatment Course:  Hailey Osborn received 3000 cGy in 5 fractions to upper vagina via High Dose Radiation - HDR.            Past Medical History:   Diagnosis Date   • Endometrial cancer (CMS/HCC)     Stage IB grade 3 pap serous   • Hyperlipidemia    • Hypertension    • Murmur    • Pre-diabetes        Past Surgical History:   Procedure Laterality Date   • APPENDECTOMY     •  SECTION     • NODE DISSECTION LAPAROSCOPIC N/A 2019    Procedure: PELVIC LYMPH NODE DISSECTION LAPAROSCOPIC WITH DAVINCI ROBOT BILATERAL;  Surgeon: Lolis Aquino MD;  Location:  [facility];  Service: DaVinci   • TOTAL LAPAROSCOPIC HYSTERECTOMY N/A 2019    Procedure: RADICAL TOTAL LAPAROSCOPIC HYSTERECTOMY BILATERAL SALPINGECTOMY WITH DAVINCI ROBOT, OMENTUMECTOMY;  Surgeon: Lolis Aquino MD;  Location:  [facility];  Service: DaVinci       MEDICATIONS: The current medication list was reviewed and reconciled.     Allergies:  is allergic to codeine.    Family History   Problem Relation Age of Onset   • Pancreatic cancer Brother          Last imaging study was pre-op CT 2019.   Tumor marker:      Date Value Ref Range Status   2019 115.4 (H) 0.0 - 38.1 U/mL Final   2019 197.3 (H) 0.0 - 38.1 U/mL Final   2019 9.8 0.0 - 38.1 U/mL Final       Review of Systems   Constitutional: Positive for activity change (r/t mid-cycle neuropathic pain in legs). Negative for appetite change, chills, fatigue, fever and unexpected weight change.   Respiratory: Negative for cough, shortness of breath and wheezing.    Cardiovascular: Negative for chest pain, palpitations and leg swelling.   Gastrointestinal: Negative for  abdominal distention, abdominal pain, blood in stool, constipation, diarrhea, nausea and vomiting.   Endocrine: Negative.    Genitourinary: Negative for dyspareunia, dysuria, frequency, genital sores, hematuria, pelvic pain, urgency, vaginal bleeding, vaginal discharge and vaginal pain.   Musculoskeletal: Negative for arthralgias, gait problem and joint swelling.   Neurological: Positive for numbness (with leg pain, neuropathy r/t chemotherapy). Negative for dizziness, seizures, syncope, weakness, light-headedness and headaches.   Hematological: Negative for adenopathy.   Psychiatric/Behavioral: Negative.        Physical Exam  Vital Signs: BP (!) 190/92 Comment: checked twice  Pulse 80   Temp 97.3 °F (36.3 °C) (Temporal)   Resp 16   Wt 101 kg (223 lb)   SpO2 96%   BMI 32.92 kg/m²   Pain Score    10/03/19 0925   PainSc: 0-No pain      General Appearance:  alert, cooperative, no apparent distress and appears stated age   Neurologic/Psychiatric: A&O x 3, gait steady, appropriate affect   HEENT:  Normocephalic, without obvious abnormality, mucous membranes moist   Neck: Supple, symmetrical, trachea midline, no adenopathy;  No thyromegaly, masses, or tenderness   Back:   Symmetric, no curvature, ROM normal, no CVA tenderness   Lungs:   Clear to auscultation bilaterally; respirations regular, even, and unlabored bilaterally   Heart:  Regular rate and rhythm, no murmurs appreciated   Breasts:  deferred   Abdomen:   Soft, non-tender, non-distended and no organomegaly   Extremities: Normal, atraumatic; no clubbing, cyanosis, or edema    Pelvic: deferred     ECOG Performance Status: 1 - Symptomatic but completely ambulatory    Procedure Notes:  No notes on file    Assessment and Plan:      Hailey Osborn is a 68 yo female with Stage IB high grade serous endometrial cancer, s/p completion of vaginal brachytherapy, currently undergoing adjuvant chemotherapy.    Serous endometrial cancer  -scheduled for cycle #3  carboplatin at AUC 6 plus paclitaxel 175 mg/m2 IV q21 days, pending labs  -6 cycles adjuvant chemotherapy planned  -patient prepared to continue treatment as planned    Chemotherapy-induced neuropathy  -carboplatin dose reduced to 90% after cycle #1 due to severity of symptoms  -gabapentin somewhat helpful, but symptoms continue to be severe during mid-cycle week limiting mobility and activity  -will increase gabapentin to 300 mg BID  -will dose reduce Taxol to help prevent worsening of neuropathy. Patient received 340 mg last cycle, dose reduction makes new dose 300 mg today.     Elevated BP  -known h/o HTN, currently on Lopressor   -BP elevated in office today, will request infusion to monitor and notify office if elevation continues for treatment  -monitor at home and follow-up with PCP if elevation on treatment continues     IV access: PICC line  -PICC site normal, dressing change to be done here today  -dressing changes closer to home in between treatments    DM/pre-DM  -sliding scale insulin with chemotherapy as needed due to hyperglycemia with steroid effect        Return to clinic in 3 weeks for chemotherapy toxicity treatment.      Pennie Mata, APRN

## 2019-10-03 NOTE — PROGRESS NOTES
Onc Nutrition    Patient: Hailey Osborn  YOB: 1950    Weight: 223#; stable  Nutrition Symptoms: none    Follow up with patient during her infusion appointment.  She denies significant nutritional complaints at this time and reports her appetite has been pretty good recently.  She also denies nutritional questions at this time.    Encouraged her to continue with her good oral intake and advised her to call RD if questions arise.  She voiced understanding of information discussed.  Encouraged to call RD if questions arise.  Will follow up as indicated.    Tegan Hicks, MONTY  10/03/19

## 2019-10-04 ENCOUNTER — TELEPHONE (OUTPATIENT)
Dept: GYNECOLOGIC ONCOLOGY | Facility: CLINIC | Age: 69
End: 2019-10-04

## 2019-10-04 ENCOUNTER — APPOINTMENT (OUTPATIENT)
Dept: INFUSION THERAPY | Facility: HOSPITAL | Age: 69
End: 2019-10-04

## 2019-10-04 ENCOUNTER — HOSPITAL ENCOUNTER (OUTPATIENT)
Dept: INFUSION THERAPY | Facility: HOSPITAL | Age: 69
Setting detail: INFUSION SERIES
Discharge: HOME | End: 2019-10-04

## 2019-10-04 VITALS — SYSTOLIC BLOOD PRESSURE: 187 MMHG | HEART RATE: 74 BPM | DIASTOLIC BLOOD PRESSURE: 99 MMHG | TEMPERATURE: 98.7 F

## 2019-10-04 DIAGNOSIS — Z45.2 NEEDS PERIPHERALLY INSERTED CENTRAL CATHETER (PICC): ICD-10-CM

## 2019-10-04 DIAGNOSIS — T82.9XXA COMPLICATION ASSOCIATED WITH PERIPHERALLY INSERTED CENTRAL CATHETER, INITIAL ENCOUNTER: ICD-10-CM

## 2019-10-04 DIAGNOSIS — C54.1 PAPILLARY SEROUS ADENOCARCINOMA OF ENDOMETRIUM (HCC): Primary | ICD-10-CM

## 2019-10-04 DIAGNOSIS — Z45.2: Primary | ICD-10-CM

## 2019-10-04 PROCEDURE — G0463 HOSPITAL OUTPT CLINIC VISIT: HCPCS

## 2019-10-04 NOTE — TELEPHONE ENCOUNTER
Pt called.  The caps on her PICC line came off last night and have been off since.  Pennie CURTIS informed.  Orders for PICC removal and replacement obtained.  Pt having PICC removed today at [facility] and having replaced at [facility] on 10/10/19 at 12:30pm.  Pt verbalized understanding.

## 2019-10-04 NOTE — TELEPHONE ENCOUNTER
Edgardo RN with [facility] called and stated pt showed up for picc line removal.  Picc didn't actually need to be pulled.  Picc cap still in place.  EVER Casper informed.

## 2019-10-07 ENCOUNTER — TELEPHONE (OUTPATIENT)
Dept: GYNECOLOGIC ONCOLOGY | Facility: CLINIC | Age: 69
End: 2019-10-07

## 2019-10-07 NOTE — TELEPHONE ENCOUNTER
Pt called with update on how she is doing.  She said bone / joint pain was much more tolerable this weekend after increasing gabapentin dosage.  She discussed not having to have her PICC pulled and seemed happy about that.  She had no complaints. She will call back with any questions or concerns.

## 2019-10-10 ENCOUNTER — APPOINTMENT (OUTPATIENT)
Dept: INFUSION THERAPY | Facility: HOSPITAL | Age: 69
End: 2019-10-10

## 2019-10-10 ENCOUNTER — HOSPITAL ENCOUNTER (OUTPATIENT)
Dept: INFUSION THERAPY | Facility: HOSPITAL | Age: 69
Setting detail: INFUSION SERIES
Discharge: HOME | End: 2019-10-10

## 2019-10-10 VITALS
HEART RATE: 76 BPM | SYSTOLIC BLOOD PRESSURE: 155 MMHG | DIASTOLIC BLOOD PRESSURE: 72 MMHG | TEMPERATURE: 98 F | RESPIRATION RATE: 18 BRPM

## 2019-10-10 DIAGNOSIS — Z45.2 PICC (PERIPHERALLY INSERTED CENTRAL CATHETER) IN PLACE: Primary | ICD-10-CM

## 2019-10-10 PROCEDURE — G0463 HOSPITAL OUTPT CLINIC VISIT: HCPCS

## 2019-10-10 RX ORDER — AMLODIPINE BESYLATE 5 MG/1
5 TABLET ORAL DAILY
COMMUNITY
End: 2022-12-29 | Stop reason: DRUGHIGH

## 2019-10-14 ENCOUNTER — HOSPITAL ENCOUNTER (OUTPATIENT)
Dept: INFUSION THERAPY | Facility: HOSPITAL | Age: 69
Setting detail: INFUSION SERIES
Discharge: HOME | End: 2019-10-14

## 2019-10-14 DIAGNOSIS — C54.1 PAPILLARY SEROUS ADENOCARCINOMA OF ENDOMETRIUM (HCC): ICD-10-CM

## 2019-10-14 LAB
ANISOCYTOSIS BLD QL: NORMAL
BASOPHILS # BLD AUTO: 0.01 10*3/MM3 (ref 0–0.2)
BASOPHILS NFR BLD AUTO: 0.4 % (ref 0–1.5)
DACRYOCYTES BLD QL SMEAR: NORMAL
DEPRECATED RDW RBC AUTO: 77.5 FL (ref 37–54)
EOSINOPHIL # BLD AUTO: 0.02 10*3/MM3 (ref 0–0.4)
EOSINOPHIL NFR BLD AUTO: 0.8 % (ref 0.3–6.2)
ERYTHROCYTE [DISTWIDTH] IN BLOOD BY AUTOMATED COUNT: 25.4 % (ref 12.3–15.4)
HCT VFR BLD AUTO: 31.9 % (ref 34–46.6)
HGB BLD-MCNC: 10 G/DL (ref 12–15.9)
HYPOCHROMIA BLD QL: NORMAL
IMM GRANULOCYTES # BLD AUTO: 0 10*3/MM3 (ref 0–0.05)
IMM GRANULOCYTES NFR BLD AUTO: 0 % (ref 0–0.5)
LYMPHOCYTES # BLD AUTO: 1.32 10*3/MM3 (ref 0.7–3.1)
LYMPHOCYTES NFR BLD AUTO: 51.4 % (ref 19.6–45.3)
MCH RBC QN AUTO: 26.3 PG (ref 26.6–33)
MCHC RBC AUTO-ENTMCNC: 31.3 G/DL (ref 31.5–35.7)
MCV RBC AUTO: 83.9 FL (ref 79–97)
MONOCYTES # BLD AUTO: 0.42 10*3/MM3 (ref 0.1–0.9)
MONOCYTES NFR BLD AUTO: 16.3 % (ref 5–12)
NEUTROPHILS # BLD AUTO: 0.8 10*3/MM3 (ref 1.7–7)
NEUTROPHILS NFR BLD AUTO: 31.1 % (ref 42.7–76)
OVALOCYTES BLD QL SMEAR: NORMAL
PLATELET # BLD AUTO: 163 10*3/MM3 (ref 140–450)
PMV BLD AUTO: 9.3 FL (ref 6–12)
RBC # BLD AUTO: 3.8 10*6/MM3 (ref 3.77–5.28)
SMALL PLATELETS BLD QL SMEAR: ADEQUATE
WBC NRBC COR # BLD: 2.57 10*3/MM3 (ref 3.4–10.8)

## 2019-10-14 PROCEDURE — 85007 BL SMEAR W/DIFF WBC COUNT: CPT | Performed by: NURSE PRACTITIONER

## 2019-10-14 PROCEDURE — 36592 COLLECT BLOOD FROM PICC: CPT

## 2019-10-14 PROCEDURE — 85025 COMPLETE CBC W/AUTO DIFF WBC: CPT | Performed by: NURSE PRACTITIONER

## 2019-10-15 ENCOUNTER — TELEPHONE (OUTPATIENT)
Dept: GYNECOLOGIC ONCOLOGY | Facility: CLINIC | Age: 69
End: 2019-10-15

## 2019-10-15 DIAGNOSIS — C54.1 PAPILLARY SEROUS ADENOCARCINOMA OF ENDOMETRIUM (HCC): ICD-10-CM

## 2019-10-15 RX ORDER — ONDANSETRON HYDROCHLORIDE 8 MG/1
8 TABLET, FILM COATED ORAL 3 TIMES DAILY PRN
Qty: 30 TABLET | Refills: 5 | Status: SHIPPED | OUTPATIENT
Start: 2019-10-15 | End: 2020-09-09

## 2019-10-15 RX ORDER — PROMETHAZINE HYDROCHLORIDE 25 MG/1
25 TABLET ORAL EVERY 6 HOURS PRN
Qty: 30 TABLET | Refills: 5 | Status: SHIPPED | OUTPATIENT
Start: 2019-10-15 | End: 2020-03-10

## 2019-10-15 NOTE — TELEPHONE ENCOUNTER
I called and informed pt of neutropenia on ying labs and instructed on neutropenic precautions and to to call with s/s of infection or fever.  Pt verbalized understanding.

## 2019-10-17 ENCOUNTER — HOSPITAL ENCOUNTER (OUTPATIENT)
Dept: INFUSION THERAPY | Facility: HOSPITAL | Age: 69
Setting detail: INFUSION SERIES
Discharge: HOME | End: 2019-10-17

## 2019-10-17 VITALS
DIASTOLIC BLOOD PRESSURE: 87 MMHG | SYSTOLIC BLOOD PRESSURE: 156 MMHG | HEART RATE: 77 BPM | TEMPERATURE: 98.4 F | RESPIRATION RATE: 17 BRPM

## 2019-10-17 DIAGNOSIS — C54.1 PAPILLARY SEROUS ADENOCARCINOMA OF ENDOMETRIUM (HCC): ICD-10-CM

## 2019-10-17 PROCEDURE — G0463 HOSPITAL OUTPT CLINIC VISIT: HCPCS

## 2019-10-24 ENCOUNTER — HOSPITAL ENCOUNTER (OUTPATIENT)
Dept: ONCOLOGY | Facility: HOSPITAL | Age: 69
Setting detail: INFUSION SERIES
Discharge: HOME | End: 2019-10-24

## 2019-10-24 ENCOUNTER — OFFICE VISIT (OUTPATIENT)
Dept: GYNECOLOGIC ONCOLOGY | Facility: CLINIC | Age: 69
End: 2019-10-24

## 2019-10-24 VITALS
DIASTOLIC BLOOD PRESSURE: 93 MMHG | OXYGEN SATURATION: 95 % | TEMPERATURE: 96.4 F | HEART RATE: 85 BPM | RESPIRATION RATE: 14 BRPM | WEIGHT: 223 LBS | BODY MASS INDEX: 32.92 KG/M2 | SYSTOLIC BLOOD PRESSURE: 193 MMHG

## 2019-10-24 VITALS — SYSTOLIC BLOOD PRESSURE: 137 MMHG | HEART RATE: 60 BPM | DIASTOLIC BLOOD PRESSURE: 68 MMHG

## 2019-10-24 DIAGNOSIS — C54.1 PAPILLARY SEROUS ADENOCARCINOMA OF ENDOMETRIUM (HCC): Primary | ICD-10-CM

## 2019-10-24 DIAGNOSIS — T45.1X5A NEUROPATHY DUE TO CHEMOTHERAPEUTIC DRUG (HCC): ICD-10-CM

## 2019-10-24 DIAGNOSIS — R73.03 PREDIABETES: ICD-10-CM

## 2019-10-24 DIAGNOSIS — T45.1X5A CHEMOTHERAPY INDUCED NEUTROPENIA (HCC): ICD-10-CM

## 2019-10-24 DIAGNOSIS — G62.0 NEUROPATHY DUE TO CHEMOTHERAPEUTIC DRUG (HCC): ICD-10-CM

## 2019-10-24 DIAGNOSIS — D70.1 CHEMOTHERAPY INDUCED NEUTROPENIA (HCC): ICD-10-CM

## 2019-10-24 LAB
ALBUMIN SERPL-MCNC: 4.5 G/DL (ref 3.5–5.2)
ALBUMIN/GLOB SERPL: 1.5 G/DL
ALP SERPL-CCNC: 84 U/L (ref 39–117)
ALT SERPL W P-5'-P-CCNC: 15 U/L (ref 1–33)
ANION GAP SERPL CALCULATED.3IONS-SCNC: 17 MMOL/L (ref 5–15)
AST SERPL-CCNC: 16 U/L (ref 1–32)
BILIRUB SERPL-MCNC: 0.3 MG/DL (ref 0.2–1.2)
BUN BLD-MCNC: 23 MG/DL (ref 8–23)
BUN/CREAT SERPL: 24.7 (ref 7–25)
CALCIUM SPEC-SCNC: 9.8 MG/DL (ref 8.6–10.5)
CANCER AG125 SERPL QL: 22.7 U/ML (ref 0–38.1)
CHLORIDE SERPL-SCNC: 102 MMOL/L (ref 98–107)
CO2 SERPL-SCNC: 23 MMOL/L (ref 22–29)
CREAT BLD-MCNC: 0.93 MG/DL (ref 0.57–1)
CREAT BLDA-MCNC: 1 MG/DL
CREAT BLDA-MCNC: 1 MG/DL (ref 0.6–1.3)
ERYTHROCYTE [DISTWIDTH] IN BLOOD BY AUTOMATED COUNT: 28.3 % (ref 12.3–15.4)
GFR SERPL CREATININE-BSD FRML MDRD: 60 ML/MIN/1.73
GLOBULIN UR ELPH-MCNC: 3.1 GM/DL
GLUCOSE BLD-MCNC: 181 MG/DL (ref 65–99)
GLUCOSE BLDC GLUCOMTR-MCNC: 183 MG/DL (ref 70–130)
HCT VFR BLD AUTO: 31.9 % (ref 34–46.6)
HGB BLD-MCNC: 10.6 G/DL (ref 12–15.9)
LYMPHOCYTES # BLD AUTO: 1.1 10*3/MM3 (ref 0.7–3.1)
LYMPHOCYTES NFR BLD AUTO: 19.9 % (ref 19.6–45.3)
MCH RBC QN AUTO: 28.7 PG (ref 26.6–33)
MCHC RBC AUTO-ENTMCNC: 33.2 G/DL (ref 31.5–35.7)
MCV RBC AUTO: 86.5 FL (ref 79–97)
MONOCYTES # BLD AUTO: 0.1 10*3/MM3 (ref 0.1–0.9)
MONOCYTES NFR BLD AUTO: 2.4 % (ref 5–12)
NEUTROPHILS # BLD AUTO: 4.4 10*3/MM3 (ref 1.7–7)
NEUTROPHILS NFR BLD AUTO: 77.7 % (ref 42.7–76)
PLATELET # BLD AUTO: 211 10*3/MM3 (ref 140–450)
PMV BLD AUTO: 6.2 FL (ref 6–12)
POTASSIUM BLD-SCNC: 4 MMOL/L (ref 3.5–5.2)
PROT SERPL-MCNC: 7.6 G/DL (ref 6–8.5)
RBC # BLD AUTO: 3.69 10*6/MM3 (ref 3.77–5.28)
SODIUM BLD-SCNC: 142 MMOL/L (ref 136–145)
WBC NRBC COR # BLD: 5.6 10*3/MM3 (ref 3.4–10.8)

## 2019-10-24 PROCEDURE — 99214 OFFICE O/P EST MOD 30 MIN: CPT | Performed by: OBSTETRICS & GYNECOLOGY

## 2019-10-24 PROCEDURE — 25010000002 PACLITAXEL PER 30 MG: Performed by: OBSTETRICS & GYNECOLOGY

## 2019-10-24 PROCEDURE — 96372 THER/PROPH/DIAG INJ SC/IM: CPT

## 2019-10-24 PROCEDURE — 96367 TX/PROPH/DG ADDL SEQ IV INF: CPT

## 2019-10-24 PROCEDURE — 80053 COMPREHEN METABOLIC PANEL: CPT | Performed by: OBSTETRICS & GYNECOLOGY

## 2019-10-24 PROCEDURE — 25010000002 DEXAMETHASONE PER 1 MG: Performed by: OBSTETRICS & GYNECOLOGY

## 2019-10-24 PROCEDURE — 85025 COMPLETE CBC W/AUTO DIFF WBC: CPT | Performed by: OBSTETRICS & GYNECOLOGY

## 2019-10-24 PROCEDURE — 96376 TX/PRO/DX INJ SAME DRUG ADON: CPT

## 2019-10-24 PROCEDURE — 96375 TX/PRO/DX INJ NEW DRUG ADDON: CPT

## 2019-10-24 PROCEDURE — 82565 ASSAY OF CREATININE: CPT

## 2019-10-24 PROCEDURE — 25010000002 DIPHENHYDRAMINE PER 50 MG: Performed by: OBSTETRICS & GYNECOLOGY

## 2019-10-24 PROCEDURE — 25010000002 PALONOSETRON 0.25 MG/5ML SOLUTION PREFILLED SYRINGE: Performed by: OBSTETRICS & GYNECOLOGY

## 2019-10-24 PROCEDURE — 25010000002 CARBOPLATIN PER 50 MG: Performed by: OBSTETRICS & GYNECOLOGY

## 2019-10-24 PROCEDURE — 96417 CHEMO IV INFUS EACH ADDL SEQ: CPT

## 2019-10-24 PROCEDURE — 63710000001 INSULIN LISPRO (HUMAN) PER 5 UNITS: Performed by: OBSTETRICS & GYNECOLOGY

## 2019-10-24 PROCEDURE — 86304 IMMUNOASSAY TUMOR CA 125: CPT | Performed by: OBSTETRICS & GYNECOLOGY

## 2019-10-24 PROCEDURE — 96415 CHEMO IV INFUSION ADDL HR: CPT

## 2019-10-24 PROCEDURE — 96413 CHEMO IV INFUSION 1 HR: CPT

## 2019-10-24 PROCEDURE — 82962 GLUCOSE BLOOD TEST: CPT

## 2019-10-24 PROCEDURE — 25010000002 FOSAPREPITANT PER 1 MG: Performed by: OBSTETRICS & GYNECOLOGY

## 2019-10-24 RX ORDER — FAMOTIDINE 10 MG/ML
20 INJECTION, SOLUTION INTRAVENOUS AS NEEDED
Status: CANCELLED | OUTPATIENT
Start: 2019-10-24

## 2019-10-24 RX ORDER — FAMOTIDINE 10 MG/ML
20 INJECTION, SOLUTION INTRAVENOUS ONCE
Status: COMPLETED | OUTPATIENT
Start: 2019-10-24 | End: 2019-10-24

## 2019-10-24 RX ORDER — FAMOTIDINE 10 MG/ML
20 INJECTION, SOLUTION INTRAVENOUS ONCE
Status: CANCELLED | OUTPATIENT
Start: 2019-10-24

## 2019-10-24 RX ORDER — SODIUM CHLORIDE 9 MG/ML
250 INJECTION, SOLUTION INTRAVENOUS ONCE
Status: CANCELLED | OUTPATIENT
Start: 2019-10-24

## 2019-10-24 RX ORDER — FAMOTIDINE 10 MG/ML
20 INJECTION, SOLUTION INTRAVENOUS AS NEEDED
Status: DISCONTINUED | OUTPATIENT
Start: 2019-10-24 | End: 2019-10-25 | Stop reason: HOSPADM

## 2019-10-24 RX ORDER — ENALAPRILAT 2.5 MG/2ML
0.62 INJECTION INTRAVENOUS ONCE AS NEEDED
Status: CANCELLED
Start: 2019-10-24

## 2019-10-24 RX ORDER — METOPROLOL TARTRATE 50 MG/1
20 TABLET, FILM COATED ORAL 2 TIMES DAILY
COMMUNITY
Start: 2019-10-03

## 2019-10-24 RX ORDER — SODIUM CHLORIDE 9 MG/ML
250 INJECTION, SOLUTION INTRAVENOUS ONCE
Status: COMPLETED | OUTPATIENT
Start: 2019-10-24 | End: 2019-10-24

## 2019-10-24 RX ORDER — DIPHENHYDRAMINE HYDROCHLORIDE 50 MG/ML
50 INJECTION INTRAMUSCULAR; INTRAVENOUS AS NEEDED
Status: CANCELLED | OUTPATIENT
Start: 2019-10-24

## 2019-10-24 RX ORDER — PALONOSETRON 0.05 MG/ML
0.25 INJECTION, SOLUTION INTRAVENOUS ONCE
Status: CANCELLED | OUTPATIENT
Start: 2019-10-24

## 2019-10-24 RX ORDER — ENALAPRILAT 2.5 MG/2ML
0.62 INJECTION INTRAVENOUS ONCE AS NEEDED
Status: DISCONTINUED | OUTPATIENT
Start: 2019-10-24 | End: 2019-10-25 | Stop reason: HOSPADM

## 2019-10-24 RX ORDER — PALONOSETRON 0.05 MG/ML
0.25 INJECTION, SOLUTION INTRAVENOUS ONCE
Status: COMPLETED | OUTPATIENT
Start: 2019-10-24 | End: 2019-10-24

## 2019-10-24 RX ORDER — GABAPENTIN 300 MG/1
300 CAPSULE ORAL 2 TIMES DAILY
Qty: 60 CAPSULE | Refills: 0 | Status: SHIPPED | OUTPATIENT
Start: 2019-10-24 | End: 2019-11-21 | Stop reason: SDUPTHER

## 2019-10-24 RX ORDER — DIPHENHYDRAMINE HYDROCHLORIDE 50 MG/ML
50 INJECTION INTRAMUSCULAR; INTRAVENOUS AS NEEDED
Status: DISCONTINUED | OUTPATIENT
Start: 2019-10-24 | End: 2019-10-25 | Stop reason: HOSPADM

## 2019-10-24 RX ADMIN — INSULIN LISPRO 2 UNITS: 100 INJECTION, SOLUTION INTRAVENOUS; SUBCUTANEOUS at 13:45

## 2019-10-24 RX ADMIN — FAMOTIDINE 20 MG: 10 INJECTION, SOLUTION INTRAVENOUS at 11:34

## 2019-10-24 RX ADMIN — SODIUM CHLORIDE 150 MG: 9 INJECTION, SOLUTION INTRAVENOUS at 11:56

## 2019-10-24 RX ADMIN — DEXAMETHASONE SODIUM PHOSPHATE 20 MG: 4 INJECTION, SOLUTION INTRAMUSCULAR; INTRAVENOUS at 11:34

## 2019-10-24 RX ADMIN — PALONOSETRON 0.25 MG: 0.25 INJECTION, SOLUTION INTRAVENOUS at 11:32

## 2019-10-24 RX ADMIN — CARBOPLATIN 590 MG: 10 INJECTION, SOLUTION INTRAVENOUS at 15:37

## 2019-10-24 RX ADMIN — PACLITAXEL 300 MG: 300 INJECTION INTRAVENOUS at 12:38

## 2019-10-24 RX ADMIN — DIPHENHYDRAMINE HYDROCHLORIDE 50 MG: 50 INJECTION INTRAMUSCULAR; INTRAVENOUS at 11:34

## 2019-10-24 RX ADMIN — SODIUM CHLORIDE 250 ML: 9 INJECTION, SOLUTION INTRAVENOUS at 11:30

## 2019-10-25 NOTE — PROGRESS NOTES
Hailey Osborn  [id number]  1950      Reason for visit: Serous endometrial cancer, chemotherapy-induced neuropathy, consideration of chemotherapy    History of present illness:  The patient is a 69 y.o. year old female who presents today for treatment and evaluation of the above issues.    Today, patient notes severe pain with her last chemotherapy cycle.  She notes that her pain in her extremities completely resolved with addition of increased gabapentin.  However, she could not walk the pain was so severe.  The pain was unresponsive to narcotic medications.  She has been seen Dr. Becker for radiation.  She notes good bowel and bladder function.  She notes good appetite and energy.  She is tolerating p.o. Well.  Of note, she had an echocardiogram in [place] and subsequent stress test which per her report were negative.She is OOB 75% of the day.  PICC is working well and she gets dressing changes in [place].    Oncologic History:     Papillary serous adenocarcinoma of endometrium (CMS/HCC)    7/22/2019 Biopsy     Patient presented to gyn, Dr. Patel, with c/o vaginal bleeding x 1 year. EMB revealed high grade papillary serous endometrial cancer.          7/28/2019 -  Other Event     While outpatient Gyn Oncology referral was pending, patient developed heavier vaginal bleeding and symptomatic anemia requiring hospital admission. Inpatient Gyn Oncology consult. Anemia managed with blood transfusions.   CA-125 = 9.8         7/29/2019 Imaging     Pre-op CT negative for chest disease or obvious metastatic disease. Enlarged uterus with underlying mass noted with several small questionable nodes.          8/2/2019 Surgery     RTLH/BSO, omentectomy, and bilateral pelvic lymph node dissection. Final pathology revealed papillary serous adenocarcinoma with invasion into 1.7 cm of 2.5 cm myometrium. No cervical involvement. Tubes, ovaries, omentum, and all nodes negative. No LVSI. MSI normal. Stage IB grade 3    Reflex  testing reveals HER2+           2019 -  Chemotherapy     OP UTERINE PACLitaxel / CARBOplatin (Q21D)  Dose reduction of carboplatin with cycle #2 due to neuropathy.         2019 - 2019 Radiation     Radiation OncologyTreatment Course:  Hailey Osborn received 3000 cGy in 5 fractions to upper vagina via High Dose Radiation - HDR.              Past Medical History:   Diagnosis Date   • Endometrial cancer (CMS/HCC)     Stage IB grade 3 pap serous   • Hyperlipidemia    • Hypertension    • Murmur    • Pre-diabetes        Past Surgical History:   Procedure Laterality Date   • APPENDECTOMY     •  SECTION     • NODE DISSECTION LAPAROSCOPIC N/A 2019    Procedure: PELVIC LYMPH NODE DISSECTION LAPAROSCOPIC WITH DAVINCI ROBOT BILATERAL;  Surgeon: Lolis Aquino MD;  Location:  [facility];  Service: DaVinci   • TOTAL LAPAROSCOPIC HYSTERECTOMY N/A 2019    Procedure: RADICAL TOTAL LAPAROSCOPIC HYSTERECTOMY BILATERAL SALPINGECTOMY WITH DAVINCI ROBOT, OMENTUMECTOMY;  Surgeon: Lolis Aquino MD;  Location:  [facility];  Service: DaVinci       MEDICATIONS: The current medication list was reviewed with the patient and updated in the EMR this date per the Medical Assistant. Medication dosages and frequencies were confirmed to be accurate.      Allergies:  is allergic to codeine.    Social History:   Social History     Socioeconomic History   • Marital status:      Spouse name: Benedicto   • Number of children: Not on file   • Years of education: Not on file   • Highest education level: Not on file   Tobacco Use   • Smoking status: Former Smoker     Last attempt to quit:      Years since quittin.8   • Smokeless tobacco: Never Used   • Tobacco comment: quit    Substance and Sexual Activity   • Alcohol use: No     Frequency: Never   • Drug use: No   • Sexual activity: Defer   Social History Narrative    Lives in [place] [facility] [place] with [place]       Family History:    Family History   Problem  Relation Age of Onset   • Pancreatic cancer Brother        Health Maintenance:    Health Maintenance   Topic Date Due   • MAMMOGRAM  1950   • URINE MICROALBUMIN  1950   • ZOSTER VACCINE (1 of 2) 08/06/2000   • PNEUMOCOCCAL VACCINES (65+ LOW/MEDIUM RISK) (1 of 2 - PCV13) 08/06/2015   • TDAP/TD VACCINES (2 - Td) 01/21/2019   • INFLUENZA VACCINE  08/01/2019   • HEPATITIS C SCREENING  08/04/2019   • MEDICARE ANNUAL WELLNESS  08/04/2019   • DIABETIC FOOT EXAM  08/04/2019   • LIPID PANEL  08/04/2019   • DIABETIC EYE EXAM  08/04/2019   • COLONOSCOPY  08/04/2019   • HEMOGLOBIN A1C  01/29/2020     Review of Systems   Constitutional: Negative for appetite change, chills, fatigue, fever and unexpected weight change.   HENT: Negative for congestion, hearing loss, sore throat and trouble swallowing.    Eyes: Negative for redness and visual disturbance.   Respiratory: Negative for cough, shortness of breath and wheezing.    Cardiovascular: Negative for chest pain, palpitations and leg swelling.   Gastrointestinal: Negative for abdominal distention, abdominal pain, blood in stool, constipation, diarrhea, nausea and vomiting.   Endocrine: Negative.  Negative for cold intolerance and heat intolerance.   Genitourinary: Negative.  Negative for difficulty urinating, dyspareunia, dysuria, frequency, genital sores, hematuria, pelvic pain, urgency, vaginal bleeding, vaginal discharge and vaginal pain.   Musculoskeletal: Negative for arthralgias, back pain, gait problem and joint swelling.   Skin: Negative.  Negative for rash and wound.   Allergic/Immunologic: Negative.  Negative for food allergies and immunocompromised state.   Neurological: Positive for numbness. Negative for dizziness, seizures, syncope, weakness, light-headedness and headaches.        Neuropathy with pain manifestation   Hematological: Negative for adenopathy. Does not bruise/bleed easily.   Psychiatric/Behavioral: Negative.  Negative for confusion,  dysphoric mood and sleep disturbance. The patient is not nervous/anxious.    All other systems reviewed and are negative.      Physical Exam    Vitals:    10/24/19 0952   BP: (!) 193/93   Pulse: 85   Resp: 14   Temp: 96.4 °F (35.8 °C)   TempSrc: Temporal   SpO2: 95%   Weight: 101 kg (223 lb)   PainSc: 0-No pain     Body mass index is 32.92 kg/m².    Wt Readings from Last 3 Encounters:   10/24/19 101 kg (223 lb)   10/03/19 101 kg (223 lb)   09/12/19 102 kg (224 lb)         GENERAL: Alert, well-appearing female appearing her stated age who is in no apparent distress.   HEENT: Sclera anicteric. Head normocephalic, atraumatic. Mucus membranes moist.  Alopecia  NECK: Trachea midline, supple, without masses.  No thyromegaly.   BREASTS: Deferred  CARDIOVASCULAR: Normal rate, regular rhythm, no murmurs, rubs, or gallops.  No peripheral edema.  RESPIRATORY: Clear to auscultation bilaterally, normal respiratory effort  BACK:  No CVA tenderness, no vertebral tenderness on palpation  GASTROINTESTINAL:  Abdomen is soft, non-tender, non-distended, no rebound or guarding, no masses, or hernias. No HSM.  Incisions well-healed  SKIN:  Warm, dry, well-perfused.  All visible areas intact.  No rashes, lesions, ulcers.  PSYCHIATRIC: AO x3, with appropriate affect, normal thought processes.  NEUROLOGIC: No focal deficits.  Moves extremities well.  MUSCULOSKELETAL: Normal gait and station.   EXTREMITIES:   No cyanosis, clubbing, symmetric.  LYMPHATICS:  No cervical or inguinal adenopathy noted.     PELVIC exam:    deferred    ECOG PS 0    PROCEDURES:  none    Diagnostic Data:        Lab Results   Component Value Date    WBC 5.60 10/24/2019    HGB 10.6 (L) 10/24/2019    HCT 31.9 (L) 10/24/2019    MCV 86.5 10/24/2019     10/24/2019    NEUTROABS 4.40 10/24/2019    GLUCOSE 181 (H) 10/24/2019    BUN 23 10/24/2019    CREATININE 1.00 10/24/2019    EGFRIFNONA 60 (L) 10/24/2019     10/24/2019    K 4.0 10/24/2019     10/24/2019     CO2 23.0 10/24/2019    CALCIUM 9.8 10/24/2019    ALBUMIN 4.50 10/24/2019    AST 16 10/24/2019    ALT 15 10/24/2019    BILITOT 0.3 10/24/2019     Lab Results   Component Value Date     22.7 10/24/2019     29.5 10/03/2019     115.4 (H) 09/03/2019     197.3 (H) 08/23/2019     9.8 07/30/2019           Assessment/Plan   This is a 69 y.o. woman with serous endometrial cancer as detailed above.  Consideration of chemotherapy.    Serous endometrial cancer  -Continue combined carboplatin and AUC of 6+ paclitaxel 175 mg/m² IV q. 21 days for total of 6 cycles.  Cycle #4 today.  Proceed pending laboratory evaluation.    Chemotherapy induced neutropenia  -insurance did not approve GCSF  -ANC = 0.8 at [facility]  -dose reduce carboplatin today     Chemotherapy induced neuropathy  -Resolved with gabapentin  -Given the severity of her symptoms after cycle #1, carboplatin was dose reduced to 90%    IV access: PICC line  -Patient to undergo dressing change today.  She gets weekly dressing changes in [place].    DM/pre DM  -Sliding scale insulin with chemotherapy as needed due to hyperglycemia with steroid effect    Pain assessment was performed today as a part of patient’s care.  For patients with pain related to surgery, gynecologic malignancy or cancer treatment, the plan is as noted in the assessment/plan.  For patients with pain not related to these issues, they are to seek any further needed care from a more appropriate provider, such as PCP.    Orders Placed This Encounter   Procedures   •      Standing Status:   Future     Number of Occurrences:   1     Standing Expiration Date:   10/23/2020   • Comprehensive metabolic panel     Standing Status:   Future     Number of Occurrences:   1     Standing Expiration Date:   10/23/2020   • CBC and Differential     Standing Status:   Future     Number of Occurrences:   1     Standing Expiration Date:   10/23/2020     Order Specific Question:   Manual  Differential     Answer:   No   • CBC & Differential     Standing Status:   Future     Standing Expiration Date:   11/3/2020     Order Specific Question:   Manual Differential     Answer:   No       FOLLOW UP: 3 weeks for consideration of chemotherapy    Lolis Aquino MD  10/24/19  1:33 PM

## 2019-10-28 RX ORDER — GABAPENTIN 100 MG/1
CAPSULE ORAL
Qty: 120 CAPSULE | Refills: 0 | OUTPATIENT
Start: 2019-10-28

## 2019-10-31 ENCOUNTER — OFFICE VISIT (OUTPATIENT)
Dept: RADIATION ONCOLOGY | Facility: HOSPITAL | Age: 69
End: 2019-10-31

## 2019-10-31 ENCOUNTER — HOSPITAL ENCOUNTER (OUTPATIENT)
Dept: RADIATION ONCOLOGY | Facility: HOSPITAL | Age: 69
Setting detail: RADIATION/ONCOLOGY SERIES
Discharge: HOME | End: 2019-10-31

## 2019-10-31 ENCOUNTER — HOSPITAL ENCOUNTER (OUTPATIENT)
Dept: ONCOLOGY | Facility: HOSPITAL | Age: 69
Setting detail: INFUSION SERIES
Discharge: HOME | End: 2019-10-31

## 2019-10-31 VITALS
BODY MASS INDEX: 33.18 KG/M2 | OXYGEN SATURATION: 96 % | TEMPERATURE: 97.8 F | DIASTOLIC BLOOD PRESSURE: 74 MMHG | HEIGHT: 69 IN | RESPIRATION RATE: 18 BRPM | WEIGHT: 224 LBS | SYSTOLIC BLOOD PRESSURE: 148 MMHG | HEART RATE: 75 BPM

## 2019-10-31 VITALS
TEMPERATURE: 97.3 F | HEART RATE: 87 BPM | DIASTOLIC BLOOD PRESSURE: 67 MMHG | SYSTOLIC BLOOD PRESSURE: 145 MMHG | WEIGHT: 223 LBS | HEIGHT: 69 IN | BODY MASS INDEX: 33.03 KG/M2 | RESPIRATION RATE: 18 BRPM

## 2019-10-31 DIAGNOSIS — T45.1X5A CHEMOTHERAPY INDUCED NEUTROPENIA (HCC): ICD-10-CM

## 2019-10-31 DIAGNOSIS — D70.1 CHEMOTHERAPY INDUCED NEUTROPENIA (HCC): ICD-10-CM

## 2019-10-31 DIAGNOSIS — C54.1 ENDOMETRIAL CANCER (HCC): Primary | ICD-10-CM

## 2019-10-31 PROCEDURE — G0463 HOSPITAL OUTPT CLINIC VISIT: HCPCS

## 2019-10-31 NOTE — PROGRESS NOTES
FOLLOW UP NOTE    PATIENT:                                                      Hailey Osborn  MEDICAL RECORD #:                        [id number]  :                                                          1950  COMPLETION DATE:    2019  DIAGNOSIS:     Cancer Staging  Papillary serous adenocarcinoma of endometrium (CMS/HCC)  Staging form: Corpus Uteri - Carcinoma And Carcinosarcoma, AJCC 8th Edition  - Pathologic: FIGO Stage IB (pT1b, pN0, cM0) - Signed by Pennie Mata APRN on 2019        BRIEF HISTORY:  Hailey Osborn  is a very pleasant 69 y.o. female  who developed postmenopausal bleeding patient and underwent robotic assisted total laparoscopic hysterectomy, bilateral salpingo-oophorectomy, omentectomy, bilateral pelvic node dissection performed on 2019 by Dr. Aquino.  Final pathology revealed filtrating high-grade papillary serous adenocarcinoma with myometrial thickness 2.5 cm and tumor invasion, 1.7 cm.  Tumor size was 7 x 5 x 2.5 cm.  Pelvic and periaortic lymph nodes were negative and there was no cervical stromal involvement.  She is undergoing 6 cycles of chemotherapy .  She received radiotherapy in our department to the upper vagina received of 30 Gy in 5 fractions utilizing and vaginal cylinder and Iridium 192.  She has no bowel or bladder complaints today and is here for follow-up.  She continues chemotherapy per Dr. Aquino.      MEDICATIONS: Medication reconciliation for the patient was reviewed and confirmed in the electronic medical record.    Review of Systems   All other systems reviewed and are negative.      KPS 90%    Physical Exam   Constitutional: She is oriented to person, place, and time. She appears well-developed and well-nourished. No distress.   HENT:   Head: Normocephalic and atraumatic.   Eyes: EOM are normal. No scleral icterus.   Neck: Neck supple.   Cardiovascular: Normal rate and regular rhythm.   Pulmonary/Chest: Effort normal and breath  "sounds normal.   Lymphadenopathy:     She has no cervical adenopathy.   Neurological: She is alert and oriented to person, place, and time.   Nursing note and vitals reviewed.      VITAL SIGNS:   Vitals:    10/31/19 1443   BP: 148/74   Pulse: 75   Resp: 18   Temp: 97.8 °F (36.6 °C)   TempSrc: Temporal   SpO2: 96%  Comment: RA   Weight: 102 kg (224 lb)   Height: 175.3 cm (69\")   PainSc:   4   PainLoc: Foot  Comment: bilateral feet pain       The following portions of the patient's history were reviewed and updated as appropriate: allergies, current medications, past family history, past medical history, past social history, past surgical history and problem list.         There are no diagnoses linked to this encounter.     IMPRESSION:   Hailey Osborn underwent robotic assisted total laparoscopic hysterectomy, bilateral salpingo-oophorectomy, omentectomy, bilateral pelvic node dissection performed on 8/2/2019 by Dr. Aquino.  Final pathology revealed filtrating high-grade papillary serous adenocarcinoma with myometrial thickness 2.5 cm and tumor invasion, 1.7 cm.  Tumor size was 7 x 5 x 2.5 cm.  Pelvic and periaortic lymph nodes were negative and there was no cervical stromal involvement.  She is undergoing 6 cycles of chemotherapy .  She received radiotherapy in our department to the upper vagina received of 30 Gy in 5 fractions utilizing and vaginal cylinder and Iridium 192.  She has no complaints today and is here for follow-up.    RECOMMENDATIONS: Ms. Osborn is doing very well.  She continues to see Dr. Aquino for chemotherapy.  Today we gave her a vaginal dilator and instructions in its use.  We will see her back in our department as needed.  It has been a pleasure to participate in her care.  Return for PRN.    Leandra Becker MD    Errors in dictation may reflect use of voice recognition software and not all errors in transcription may have been detected prior to signing.  "

## 2019-11-04 ENCOUNTER — HOSPITAL ENCOUNTER (OUTPATIENT)
Dept: INFUSION THERAPY | Facility: HOSPITAL | Age: 69
Setting detail: INFUSION SERIES
Discharge: HOME | End: 2019-11-04

## 2019-11-04 ENCOUNTER — TELEPHONE (OUTPATIENT)
Dept: GYNECOLOGIC ONCOLOGY | Facility: CLINIC | Age: 69
End: 2019-11-04

## 2019-11-04 DIAGNOSIS — C54.1 PAPILLARY SEROUS ADENOCARCINOMA OF ENDOMETRIUM (HCC): ICD-10-CM

## 2019-11-04 LAB
ANISOCYTOSIS BLD QL: NORMAL
BASOPHILS # BLD AUTO: 0.01 10*3/MM3 (ref 0–0.2)
BASOPHILS NFR BLD AUTO: 0.3 % (ref 0–1.5)
DEPRECATED RDW RBC AUTO: 76.7 FL (ref 37–54)
EOSINOPHIL # BLD AUTO: 0.01 10*3/MM3 (ref 0–0.4)
EOSINOPHIL NFR BLD AUTO: 0.3 % (ref 0.3–6.2)
ERYTHROCYTE [DISTWIDTH] IN BLOOD BY AUTOMATED COUNT: 23.9 % (ref 12.3–15.4)
HCT VFR BLD AUTO: 29 % (ref 34–46.6)
HGB BLD-MCNC: 9.4 G/DL (ref 12–15.9)
IMM GRANULOCYTES # BLD AUTO: 0.01 10*3/MM3 (ref 0–0.05)
IMM GRANULOCYTES NFR BLD AUTO: 0.3 % (ref 0–0.5)
LARGE PLATELETS: NORMAL
LYMPHOCYTES # BLD AUTO: 1.53 10*3/MM3 (ref 0.7–3.1)
LYMPHOCYTES NFR BLD AUTO: 45.7 % (ref 19.6–45.3)
MCH RBC QN AUTO: 28.7 PG (ref 26.6–33)
MCHC RBC AUTO-ENTMCNC: 32.4 G/DL (ref 31.5–35.7)
MCV RBC AUTO: 88.4 FL (ref 79–97)
MONOCYTES # BLD AUTO: 0.4 10*3/MM3 (ref 0.1–0.9)
MONOCYTES NFR BLD AUTO: 11.9 % (ref 5–12)
NEUTROPHILS # BLD AUTO: 1.39 10*3/MM3 (ref 1.7–7)
NEUTROPHILS NFR BLD AUTO: 41.5 % (ref 42.7–76)
NRBC BLD AUTO-RTO: 0 /100 WBC (ref 0–0.2)
OVALOCYTES BLD QL SMEAR: NORMAL
PLATELET # BLD AUTO: 142 10*3/MM3 (ref 140–450)
PMV BLD AUTO: 8.9 FL (ref 6–12)
RBC # BLD AUTO: 3.28 10*6/MM3 (ref 3.77–5.28)
WBC NRBC COR # BLD: 3.35 10*3/MM3 (ref 3.4–10.8)

## 2019-11-04 PROCEDURE — 85007 BL SMEAR W/DIFF WBC COUNT: CPT | Performed by: OBSTETRICS & GYNECOLOGY

## 2019-11-04 PROCEDURE — 85025 COMPLETE CBC W/AUTO DIFF WBC: CPT | Performed by: OBSTETRICS & GYNECOLOGY

## 2019-11-04 PROCEDURE — 36592 COLLECT BLOOD FROM PICC: CPT

## 2019-11-04 NOTE — TELEPHONE ENCOUNTER
Pt called for ying lab results.  Reviewed with her and no interventions needed.  She verbalized understanding.

## 2019-11-07 ENCOUNTER — HOSPITAL ENCOUNTER (OUTPATIENT)
Dept: INFUSION THERAPY | Facility: HOSPITAL | Age: 69
Setting detail: INFUSION SERIES
Discharge: HOME | End: 2019-11-07

## 2019-11-07 DIAGNOSIS — R92.8 ABNORMAL MAMMOGRAM: ICD-10-CM

## 2019-11-07 PROCEDURE — G0463 HOSPITAL OUTPT CLINIC VISIT: HCPCS

## 2019-11-11 ENCOUNTER — TELEPHONE (OUTPATIENT)
Dept: GYNECOLOGIC ONCOLOGY | Facility: CLINIC | Age: 69
End: 2019-11-11

## 2019-11-11 NOTE — TELEPHONE ENCOUNTER
Pt called today and I returned her call.  She wanted to know if her appt on 11/14/19 is with Dr. Aquino.  Informed her that yes it is.  She v/u.

## 2019-11-13 NOTE — PROGRESS NOTES
Hailey Osborn  [id number]  1950      Reason for visit: Serous endometrial cancer, chemotherapy-induced neuropathy, consideration of chemotherapy,     History of present illness:  The patient is a 69 y.o. year old female who presents today for treatment and evaluation of the above issues.    Patient reports doing well today.  She notes good bowel and bladder function.  She notes good appetite and energy.  She is tolerating p.o. well.  Her neuropathy is improved with medical management.  She still has pain, but it is improved with tylenol and     Oncologic History:     Papillary serous adenocarcinoma of endometrium (CMS/HCC)    7/22/2019 Biopsy     Patient presented to gyn, Dr. Patel, with c/o vaginal bleeding x 1 year. EMB revealed high grade papillary serous endometrial cancer.          7/28/2019 -  Other Event     While outpatient Gyn Oncology referral was pending, patient developed heavier vaginal bleeding and symptomatic anemia requiring hospital admission. Inpatient Gyn Oncology consult. Anemia managed with blood transfusions.   CA-125 = 9.8         7/29/2019 Imaging     Pre-op CT negative for chest disease or obvious metastatic disease. Enlarged uterus with underlying mass noted with several small questionable nodes.          8/2/2019 Surgery     RTLH/BSO, omentectomy, and bilateral pelvic lymph node dissection. Final pathology revealed papillary serous adenocarcinoma with invasion into 1.7 cm of 2.5 cm myometrium. No cervical involvement. Tubes, ovaries, omentum, and all nodes negative. No LVSI. MSI normal. Stage IB grade 3    Reflex testing reveals HER2+           8/23/2019 -  Chemotherapy     OP UTERINE PACLitaxel / CARBOplatin (Q21D)  Dose reduction of carboplatin with cycle #2 due to neuropathy.         9/18/2019 - 9/30/2019 Radiation     Radiation OncologyTreatment Course:  Hailey Osborn received 3000 cGy in 5 fractions to upper vagina via High Dose Radiation - HDR.              Past Medical  History:   Diagnosis Date   • Endometrial cancer (CMS/[facility])     Stage IB grade 3 pap serous   • Hyperlipidemia    • Hypertension    • Murmur    • Pre-diabetes        Past Surgical History:   Procedure Laterality Date   • APPENDECTOMY     •  SECTION     • NODE DISSECTION LAPAROSCOPIC N/A 2019    Procedure: PELVIC LYMPH NODE DISSECTION LAPAROSCOPIC WITH DAVINCI ROBOT BILATERAL;  Surgeon: Lolis Aquino MD;  Location: [facility] OR;  Service: DaVinci   • TOTAL LAPAROSCOPIC HYSTERECTOMY N/A 2019    Procedure: RADICAL TOTAL LAPAROSCOPIC HYSTERECTOMY BILATERAL SALPINGECTOMY WITH DAVINCI ROBOT, OMENTUMECTOMY;  Surgeon: Lolis Aquino MD;  Location: [facility] OR;  Service: DaVinci       MEDICATIONS: The current medication list was reviewed with the patient and updated in the EMR this date per the Medical Assistant. Medication dosages and frequencies were confirmed to be accurate.      Allergies:  is allergic to codeine.    Social History:   Social History     Socioeconomic History   • Marital status:      Spouse name: Benedicto   • Number of children: Not on file   • Years of education: Not on file   • Highest education level: Not on file   Tobacco Use   • Smoking status: Former Smoker     Last attempt to quit: 1980     Years since quittin.8   • Smokeless tobacco: Never Used   • Tobacco comment: quit    Substance and Sexual Activity   • Alcohol use: No     Frequency: Never   • Drug use: No   • Sexual activity: Defer   Social History Narrative    Lives in [facility] with [facility]       Family History:    Family History   Problem Relation Age of Onset   • Pancreatic cancer Brother        Health Maintenance:    Health Maintenance   Topic Date Due   • MAMMOGRAM  1950   • URINE MICROALBUMIN  1950   • ZOSTER VACCINE (1 of 2) 2000   • PNEUMOCOCCAL VACCINES (65+ LOW/MEDIUM RISK) (1 of 2 - PCV13) 2015   • TDAP/TD VACCINES (2 - Td) 2019   • INFLUENZA VACCINE  2019   •  HEPATITIS C SCREENING  08/04/2019   • MEDICARE ANNUAL WELLNESS  08/04/2019   • DIABETIC FOOT EXAM  08/04/2019   • LIPID PANEL  08/04/2019   • DIABETIC EYE EXAM  08/04/2019   • COLONOSCOPY  08/04/2019   • HEMOGLOBIN A1C  01/29/2020     Review of Systems   Constitutional: Negative for appetite change, chills, fatigue, fever and unexpected weight change.   HENT: Negative for congestion, hearing loss, sore throat and trouble swallowing.    Eyes: Negative for redness and visual disturbance.   Respiratory: Negative for cough, shortness of breath and wheezing.    Cardiovascular: Negative for chest pain, palpitations and leg swelling.   Gastrointestinal: Negative for abdominal distention, abdominal pain, blood in stool, constipation, diarrhea, nausea and vomiting.   Endocrine: Negative.  Negative for cold intolerance and heat intolerance.   Genitourinary: Negative.  Negative for difficulty urinating, dyspareunia, dysuria, frequency, genital sores, hematuria, pelvic pain, urgency, vaginal bleeding, vaginal discharge and vaginal pain.   Musculoskeletal: Negative for arthralgias, back pain, gait problem and joint swelling.   Skin: Negative.  Negative for rash and wound.   Allergic/Immunologic: Negative.  Negative for food allergies and immunocompromised state.   Neurological: Positive for numbness. Negative for dizziness, seizures, syncope, weakness, light-headedness and headaches.        Neuropathy with pain manifestation   Hematological: Negative for adenopathy. Does not bruise/bleed easily.   Psychiatric/Behavioral: Negative.  Negative for confusion, dysphoric mood and sleep disturbance. The patient is not nervous/anxious.    All other systems reviewed and are negative.      Physical Exam    Vitals:    11/14/19 0952   BP: (!) 184/85   Pulse: 80   Resp: 14   Temp: 96.8 °F (36 °C)   TempSrc: Temporal   Weight: 102 kg (225 lb)   PainSc: 0-No pain     Body mass index is 33.23 kg/m².    Wt Readings from Last 3 Encounters:    11/14/19 102 kg (225 lb)   10/31/19 101 kg (223 lb)   10/31/19 102 kg (224 lb)         GENERAL: Alert, well-appearing female appearing her stated age who is in no apparent distress.   HEENT: Sclera anicteric. Head normocephalic, atraumatic. Mucus membranes moist.  Alopecia  NECK: Trachea midline, supple, without masses.  No thyromegaly.   BREASTS: Deferred  CARDIOVASCULAR: Normal rate, regular rhythm, no murmurs, rubs, or gallops.  No peripheral edema.  RESPIRATORY: Clear to auscultation bilaterally, normal respiratory effort  BACK:  No CVA tenderness, no vertebral tenderness on palpation  GASTROINTESTINAL:  Abdomen is soft, non-tender, non-distended, no rebound or guarding, no masses, or hernias. No HSM.  Incisions well-healed  SKIN:  Warm, dry, well-perfused.  All visible areas intact.  No rashes, lesions, ulcers.  PSYCHIATRIC: AO x3, with appropriate affect, normal thought processes.  NEUROLOGIC: No focal deficits.  Moves extremities well.  MUSCULOSKELETAL: Normal gait and station.   EXTREMITIES:   No cyanosis, clubbing, symmetric.  PICC RUE  LYMPHATICS:  No cervical or inguinal adenopathy noted.     PELVIC exam:    GYNECOLOGIC:  External genitalia are free from lesion. On speculum examination, the vaginal cuff was intact and no lesions were appreciated.  On bimanual examination, no fullness was appreciated.  Uterus, cervix and adnexa were absent.  There was no significant tenderness.  Rectovaginal exam was deferred.    ECOG PS 0    PROCEDURES:  none    Diagnostic Data:        Lab Results   Component Value Date    WBC 7.10 11/14/2019    HGB 10.3 (L) 11/14/2019    HCT 31.1 (L) 11/14/2019    MCV 91.8 11/14/2019     11/14/2019    NEUTROABS 5.50 11/14/2019    GLUCOSE 164 (H) 11/14/2019    BUN 24 (H) 11/14/2019    CREATININE 0.90 11/14/2019    EGFRIFNONA 65 11/14/2019     11/14/2019    K 4.3 11/14/2019     11/14/2019    CO2 25.0 11/14/2019    CALCIUM 9.9 11/14/2019    ALBUMIN 4.40 11/14/2019     AST 18 11/14/2019    ALT 16 11/14/2019    BILITOT 0.3 11/14/2019     Lab Results   Component Value Date     22.2 11/14/2019     22.7 10/24/2019     29.5 10/03/2019     115.4 (H) 09/03/2019     197.3 (H) 08/23/2019     9.8 07/30/2019           Assessment/Plan   This is a 69 y.o. woman with serous endometrial cancer as detailed above.  Consideration of chemotherapy.    Serous endometrial cancer  -Continue combined carboplatin and AUC of 6+ paclitaxel 175 mg/m² IV q. 21 days for total of 6 cycles.  Cycle #5 today.  Proceed pending laboratory evaluation.    Chemotherapy induced neutropenia  -Insurance did not approve GCSF  -Improved with dose reduction of carboplatin  -Continue to ovserve    Chemotherapy induced neuropathy  -Doing well with gabapentin  -Given the severity of her symptoms after cycle #1, carboplatin was dose reduced to 90%    IV access: PICC line  -Patient to undergo dressing change today.  She gets weekly dressing changes in [place].    DM/pre DM  -Sliding scale insulin with chemotherapy as needed due to hyperglycemia with steroid effect    Pain assessment was performed today as a part of patient’s care.  For patients with pain related to surgery, gynecologic malignancy or cancer treatment, the plan is as noted in the assessment/plan.  For patients with pain not related to these issues, they are to seek any further needed care from a more appropriate provider, such as PCP.    Orders Placed This Encounter   Procedures   •      Standing Status:   Future     Number of Occurrences:   1     Standing Expiration Date:   11/13/2020   • Comprehensive metabolic panel     Standing Status:   Future     Number of Occurrences:   1     Standing Expiration Date:   11/13/2020   • CBC and Differential     Standing Status:   Future     Number of Occurrences:   1     Standing Expiration Date:   11/13/2020     Order Specific Question:   Manual Differential     Answer:   No   • CBC &  Differential     Standing Status:   Future     Standing Expiration Date:   11/24/2020     Order Specific Question:   Manual Differential     Answer:   No     FOLLOW UP: 3 weeks for consideration of chemotherapy    Patient was seen and examined with Dr. Garza,  resident, who performed portions of the examination and documentation for this patient's care under my direct supervision.  I agree with the above documentation and plan.    Lolis Aquino MD  11/14/19  3:49 PM

## 2019-11-14 ENCOUNTER — HOSPITAL ENCOUNTER (OUTPATIENT)
Dept: ONCOLOGY | Facility: HOSPITAL | Age: 69
Setting detail: INFUSION SERIES
Discharge: HOME | End: 2019-11-14

## 2019-11-14 ENCOUNTER — OFFICE VISIT (OUTPATIENT)
Dept: GYNECOLOGIC ONCOLOGY | Facility: CLINIC | Age: 69
End: 2019-11-14

## 2019-11-14 VITALS
TEMPERATURE: 96.8 F | SYSTOLIC BLOOD PRESSURE: 184 MMHG | HEART RATE: 80 BPM | BODY MASS INDEX: 33.23 KG/M2 | RESPIRATION RATE: 14 BRPM | WEIGHT: 225 LBS | DIASTOLIC BLOOD PRESSURE: 85 MMHG

## 2019-11-14 VITALS — DIASTOLIC BLOOD PRESSURE: 61 MMHG | HEART RATE: 74 BPM | SYSTOLIC BLOOD PRESSURE: 125 MMHG

## 2019-11-14 DIAGNOSIS — C54.1 PAPILLARY SEROUS ADENOCARCINOMA OF ENDOMETRIUM (HCC): Primary | ICD-10-CM

## 2019-11-14 DIAGNOSIS — T45.1X5A NEUROPATHY DUE TO CHEMOTHERAPEUTIC DRUG (HCC): ICD-10-CM

## 2019-11-14 DIAGNOSIS — G62.0 NEUROPATHY DUE TO CHEMOTHERAPEUTIC DRUG (HCC): ICD-10-CM

## 2019-11-14 DIAGNOSIS — R73.03 PREDIABETES: ICD-10-CM

## 2019-11-14 DIAGNOSIS — T45.1X5A CHEMOTHERAPY INDUCED NEUTROPENIA (HCC): ICD-10-CM

## 2019-11-14 DIAGNOSIS — D70.1 CHEMOTHERAPY INDUCED NEUTROPENIA (HCC): ICD-10-CM

## 2019-11-14 LAB
ALBUMIN SERPL-MCNC: 4.4 G/DL (ref 3.5–5.2)
ALBUMIN/GLOB SERPL: 1.5 G/DL
ALP SERPL-CCNC: 85 U/L (ref 39–117)
ALT SERPL W P-5'-P-CCNC: 16 U/L (ref 1–33)
ANION GAP SERPL CALCULATED.3IONS-SCNC: 15 MMOL/L (ref 5–15)
AST SERPL-CCNC: 18 U/L (ref 1–32)
BILIRUB SERPL-MCNC: 0.3 MG/DL (ref 0.2–1.2)
BUN BLD-MCNC: 24 MG/DL (ref 8–23)
BUN/CREAT SERPL: 27.6 (ref 7–25)
CALCIUM SPEC-SCNC: 9.9 MG/DL (ref 8.6–10.5)
CANCER AG125 SERPL QL: 22.2 U/ML (ref 0–38.1)
CHLORIDE SERPL-SCNC: 102 MMOL/L (ref 98–107)
CO2 SERPL-SCNC: 25 MMOL/L (ref 22–29)
CREAT BLD-MCNC: 0.87 MG/DL (ref 0.57–1)
CREAT BLDA-MCNC: 0.9 MG/DL
CREAT BLDA-MCNC: 0.9 MG/DL (ref 0.6–1.3)
ERYTHROCYTE [DISTWIDTH] IN BLOOD BY AUTOMATED COUNT: 25.2 % (ref 12.3–15.4)
GFR SERPL CREATININE-BSD FRML MDRD: 65 ML/MIN/1.73
GLOBULIN UR ELPH-MCNC: 3 GM/DL
GLUCOSE BLD-MCNC: 164 MG/DL (ref 65–99)
GLUCOSE BLDC GLUCOMTR-MCNC: 200 MG/DL (ref 70–130)
HCT VFR BLD AUTO: 31.1 % (ref 34–46.6)
HGB BLD-MCNC: 10.3 G/DL (ref 12–15.9)
LYMPHOCYTES # BLD AUTO: 1.4 10*3/MM3 (ref 0.7–3.1)
LYMPHOCYTES NFR BLD AUTO: 19.9 % (ref 19.6–45.3)
MCH RBC QN AUTO: 30.3 PG (ref 26.6–33)
MCHC RBC AUTO-ENTMCNC: 33 G/DL (ref 31.5–35.7)
MCV RBC AUTO: 91.8 FL (ref 79–97)
MONOCYTES # BLD AUTO: 0.2 10*3/MM3 (ref 0.1–0.9)
MONOCYTES NFR BLD AUTO: 2.6 % (ref 5–12)
NEUTROPHILS # BLD AUTO: 5.5 10*3/MM3 (ref 1.7–7)
NEUTROPHILS NFR BLD AUTO: 77.5 % (ref 42.7–76)
PLATELET # BLD AUTO: 196 10*3/MM3 (ref 140–450)
PMV BLD AUTO: 6 FL (ref 6–12)
POTASSIUM BLD-SCNC: 4.3 MMOL/L (ref 3.5–5.2)
PROT SERPL-MCNC: 7.4 G/DL (ref 6–8.5)
RBC # BLD AUTO: 3.38 10*6/MM3 (ref 3.77–5.28)
SODIUM BLD-SCNC: 142 MMOL/L (ref 136–145)
WBC NRBC COR # BLD: 7.1 10*3/MM3 (ref 3.4–10.8)

## 2019-11-14 PROCEDURE — 80053 COMPREHEN METABOLIC PANEL: CPT | Performed by: OBSTETRICS & GYNECOLOGY

## 2019-11-14 PROCEDURE — 25010000002 FOSAPREPITANT PER 1 MG: Performed by: OBSTETRICS & GYNECOLOGY

## 2019-11-14 PROCEDURE — 25010000002 DIPHENHYDRAMINE PER 50 MG: Performed by: OBSTETRICS & GYNECOLOGY

## 2019-11-14 PROCEDURE — 25010000002 CARBOPLATIN PER 50 MG: Performed by: OBSTETRICS & GYNECOLOGY

## 2019-11-14 PROCEDURE — 86304 IMMUNOASSAY TUMOR CA 125: CPT | Performed by: OBSTETRICS & GYNECOLOGY

## 2019-11-14 PROCEDURE — 96367 TX/PROPH/DG ADDL SEQ IV INF: CPT

## 2019-11-14 PROCEDURE — 25010000002 PACLITAXEL PER 30 MG: Performed by: OBSTETRICS & GYNECOLOGY

## 2019-11-14 PROCEDURE — 99214 OFFICE O/P EST MOD 30 MIN: CPT | Performed by: OBSTETRICS & GYNECOLOGY

## 2019-11-14 PROCEDURE — 85025 COMPLETE CBC W/AUTO DIFF WBC: CPT | Performed by: OBSTETRICS & GYNECOLOGY

## 2019-11-14 PROCEDURE — 82565 ASSAY OF CREATININE: CPT

## 2019-11-14 PROCEDURE — 96372 THER/PROPH/DIAG INJ SC/IM: CPT

## 2019-11-14 PROCEDURE — 96413 CHEMO IV INFUSION 1 HR: CPT

## 2019-11-14 PROCEDURE — 63710000001 INSULIN LISPRO (HUMAN) PER 5 UNITS: Performed by: OBSTETRICS & GYNECOLOGY

## 2019-11-14 PROCEDURE — 96375 TX/PRO/DX INJ NEW DRUG ADDON: CPT

## 2019-11-14 PROCEDURE — 25010000002 DEXAMETHASONE PER 1 MG: Performed by: OBSTETRICS & GYNECOLOGY

## 2019-11-14 PROCEDURE — 96415 CHEMO IV INFUSION ADDL HR: CPT

## 2019-11-14 PROCEDURE — 25010000002 PALONOSETRON 0.25 MG/5ML SOLUTION PREFILLED SYRINGE: Performed by: OBSTETRICS & GYNECOLOGY

## 2019-11-14 PROCEDURE — 96417 CHEMO IV INFUS EACH ADDL SEQ: CPT

## 2019-11-14 PROCEDURE — 82962 GLUCOSE BLOOD TEST: CPT

## 2019-11-14 RX ORDER — PALONOSETRON 0.05 MG/ML
0.25 INJECTION, SOLUTION INTRAVENOUS ONCE
Status: COMPLETED | OUTPATIENT
Start: 2019-11-14 | End: 2019-11-14

## 2019-11-14 RX ORDER — ENALAPRILAT 2.5 MG/2ML
1.25 INJECTION INTRAVENOUS EVERY 6 HOURS
Status: DISCONTINUED | OUTPATIENT
Start: 2019-11-14 | End: 2019-11-15 | Stop reason: HOSPADM

## 2019-11-14 RX ORDER — SODIUM CHLORIDE 9 MG/ML
250 INJECTION, SOLUTION INTRAVENOUS ONCE
Status: COMPLETED | OUTPATIENT
Start: 2019-11-14 | End: 2019-11-14

## 2019-11-14 RX ORDER — SODIUM CHLORIDE 9 MG/ML
250 INJECTION, SOLUTION INTRAVENOUS ONCE
Status: CANCELLED | OUTPATIENT
Start: 2019-11-14

## 2019-11-14 RX ORDER — FAMOTIDINE 10 MG/ML
20 INJECTION, SOLUTION INTRAVENOUS ONCE
Status: COMPLETED | OUTPATIENT
Start: 2019-11-14 | End: 2019-11-14

## 2019-11-14 RX ORDER — FAMOTIDINE 10 MG/ML
20 INJECTION, SOLUTION INTRAVENOUS ONCE
Status: CANCELLED | OUTPATIENT
Start: 2019-11-14

## 2019-11-14 RX ORDER — ENALAPRILAT 2.5 MG/2ML
1.25 INJECTION INTRAVENOUS EVERY 6 HOURS
Status: CANCELLED
Start: 2019-11-14

## 2019-11-14 RX ORDER — FAMOTIDINE 10 MG/ML
20 INJECTION, SOLUTION INTRAVENOUS AS NEEDED
Status: DISCONTINUED | OUTPATIENT
Start: 2019-11-14 | End: 2019-11-15 | Stop reason: HOSPADM

## 2019-11-14 RX ORDER — PALONOSETRON 0.05 MG/ML
0.25 INJECTION, SOLUTION INTRAVENOUS ONCE
Status: CANCELLED | OUTPATIENT
Start: 2019-11-14

## 2019-11-14 RX ORDER — DIPHENHYDRAMINE HYDROCHLORIDE 50 MG/ML
50 INJECTION INTRAMUSCULAR; INTRAVENOUS AS NEEDED
Status: CANCELLED | OUTPATIENT
Start: 2019-11-14

## 2019-11-14 RX ORDER — DIPHENHYDRAMINE HYDROCHLORIDE 50 MG/ML
50 INJECTION INTRAMUSCULAR; INTRAVENOUS AS NEEDED
Status: DISCONTINUED | OUTPATIENT
Start: 2019-11-14 | End: 2019-11-15 | Stop reason: HOSPADM

## 2019-11-14 RX ORDER — FAMOTIDINE 10 MG/ML
20 INJECTION, SOLUTION INTRAVENOUS AS NEEDED
Status: CANCELLED | OUTPATIENT
Start: 2019-11-14

## 2019-11-14 RX ADMIN — SODIUM CHLORIDE 250 ML: 9 INJECTION, SOLUTION INTRAVENOUS at 11:53

## 2019-11-14 RX ADMIN — PALONOSETRON 0.25 MG: 0.25 INJECTION, SOLUTION INTRAVENOUS at 11:58

## 2019-11-14 RX ADMIN — SODIUM CHLORIDE 300 MG: 9 INJECTION, SOLUTION INTRAVENOUS at 13:01

## 2019-11-14 RX ADMIN — SODIUM CHLORIDE 150 MG: 9 INJECTION, SOLUTION INTRAVENOUS at 12:24

## 2019-11-14 RX ADMIN — FAMOTIDINE 20 MG: 10 INJECTION, SOLUTION INTRAVENOUS at 12:00

## 2019-11-14 RX ADMIN — CARBOPLATIN 590 MG: 10 INJECTION, SOLUTION INTRAVENOUS at 16:02

## 2019-11-14 RX ADMIN — INSULIN LISPRO 2 UNITS: 100 INJECTION, SOLUTION INTRAVENOUS; SUBCUTANEOUS at 12:18

## 2019-11-14 RX ADMIN — DIPHENHYDRAMINE HYDROCHLORIDE 50 MG: 50 INJECTION INTRAMUSCULAR; INTRAVENOUS at 12:00

## 2019-11-14 RX ADMIN — DEXAMETHASONE SODIUM PHOSPHATE 20 MG: 4 INJECTION, SOLUTION INTRAMUSCULAR; INTRAVENOUS at 12:00

## 2019-11-15 ENCOUNTER — TELEPHONE (OUTPATIENT)
Dept: GYNECOLOGIC ONCOLOGY | Facility: CLINIC | Age: 69
End: 2019-11-15

## 2019-11-15 NOTE — TELEPHONE ENCOUNTER
Patient called and states she has been checking her glucose at home. After chemo it was 253, then at 0100 AM it was 239 and at 1000 this morning it was 181. I advised patient increase in glucose is in response to the steroid she is using for treatment. Advised patient to limit Carbohydrate intake. She will continue to monitor her glucose and take her metformin 1000mg bid. If her glucose she reach 300 or greater, she will need to let us know or go to the ER or ask her PCP who treats her DM II. She verbalized understanding.

## 2019-11-21 ENCOUNTER — HOSPITAL ENCOUNTER (OUTPATIENT)
Dept: INFUSION THERAPY | Facility: HOSPITAL | Age: 69
Setting detail: INFUSION SERIES
Discharge: HOME | End: 2019-11-21

## 2019-11-21 PROCEDURE — G0463 HOSPITAL OUTPT CLINIC VISIT: HCPCS

## 2019-11-21 RX ORDER — GABAPENTIN 300 MG/1
300 CAPSULE ORAL 2 TIMES DAILY
Qty: 60 CAPSULE | Refills: 0 | Status: SHIPPED | OUTPATIENT
Start: 2019-11-21 | End: 2019-12-27

## 2019-11-21 RX ORDER — PANTOPRAZOLE SODIUM 40 MG/1
40 TABLET, DELAYED RELEASE ORAL DAILY
Qty: 30 TABLET | Refills: 2 | Status: SHIPPED | OUTPATIENT
Start: 2019-11-21 | End: 2020-02-17

## 2019-11-21 RX ORDER — OXYCODONE HYDROCHLORIDE 5 MG/1
5 TABLET ORAL EVERY 6 HOURS PRN
Qty: 20 TABLET | Refills: 0 | Status: SHIPPED | OUTPATIENT
Start: 2019-11-21 | End: 2020-03-10

## 2019-11-21 NOTE — TELEPHONE ENCOUNTER
Pt called.  She is having acid reflux daily.  Pantoprazole sent to her pharmacy.  She is out of oxycodone and requests a refill.  She says she is only taking this for severe peripheral neuropathy pain at night when tylenol and ibuprofen do not work.  RF request for gabapentin so she doesn't run out over the holiday.  Dr. Aquino informed.

## 2019-11-25 ENCOUNTER — HOSPITAL ENCOUNTER (OUTPATIENT)
Dept: INFUSION THERAPY | Facility: HOSPITAL | Age: 69
Setting detail: INFUSION SERIES
Discharge: HOME | End: 2019-11-25

## 2019-11-25 DIAGNOSIS — C54.1 PAPILLARY SEROUS ADENOCARCINOMA OF ENDOMETRIUM (HCC): ICD-10-CM

## 2019-11-25 LAB
ANISOCYTOSIS BLD QL: ABNORMAL
DEPRECATED RDW RBC AUTO: 71.3 FL (ref 37–54)
ERYTHROCYTE [DISTWIDTH] IN BLOOD BY AUTOMATED COUNT: 21.2 % (ref 12.3–15.4)
HCT VFR BLD AUTO: 24.7 % (ref 34–46.6)
HGB BLD-MCNC: 8 G/DL (ref 12–15.9)
HYPOCHROMIA BLD QL: ABNORMAL
LYMPHOCYTES # BLD MANUAL: 1.04 10*3/MM3 (ref 0.7–3.1)
LYMPHOCYTES NFR BLD MANUAL: 27 % (ref 5–12)
LYMPHOCYTES NFR BLD MANUAL: 36 % (ref 19.6–45.3)
MCH RBC QN AUTO: 30 PG (ref 26.6–33)
MCHC RBC AUTO-ENTMCNC: 32.4 G/DL (ref 31.5–35.7)
MCV RBC AUTO: 92.5 FL (ref 79–97)
MONOCYTES # BLD AUTO: 0.78 10*3/MM3 (ref 0.1–0.9)
NEUTROPHILS # BLD AUTO: 1.07 10*3/MM3 (ref 1.7–7)
NEUTROPHILS NFR BLD MANUAL: 37 % (ref 42.7–76)
NRBC SPEC MANUAL: 1 /100 WBC (ref 0–0.2)
OVALOCYTES BLD QL SMEAR: ABNORMAL
PLATELET # BLD AUTO: 90 10*3/MM3 (ref 140–450)
PMV BLD AUTO: 9.8 FL (ref 6–12)
RBC # BLD AUTO: 2.67 10*6/MM3 (ref 3.77–5.28)
SCAN SLIDE: NORMAL
SMALL PLATELETS BLD QL SMEAR: ABNORMAL
WBC NRBC COR # BLD: 2.88 10*3/MM3 (ref 3.4–10.8)

## 2019-11-25 PROCEDURE — 85025 COMPLETE CBC W/AUTO DIFF WBC: CPT | Performed by: OBSTETRICS & GYNECOLOGY

## 2019-11-25 PROCEDURE — 85007 BL SMEAR W/DIFF WBC COUNT: CPT | Performed by: OBSTETRICS & GYNECOLOGY

## 2019-11-25 PROCEDURE — 36592 COLLECT BLOOD FROM PICC: CPT

## 2019-11-26 RX ORDER — GABAPENTIN 300 MG/1
CAPSULE ORAL
Qty: 60 CAPSULE | Refills: 0 | OUTPATIENT
Start: 2019-11-26

## 2019-11-26 NOTE — TELEPHONE ENCOUNTER
----- Message from Lolis Aquino MD sent at 11/25/2019  2:16 PM EST -----  Fall precautions    ----- Message -----  From: Lab, Background User  Sent: 11/25/2019   2:05 PM  To: Lolis Aquino MD

## 2019-11-27 ENCOUNTER — HOSPITAL ENCOUNTER (OUTPATIENT)
Dept: INFUSION THERAPY | Facility: HOSPITAL | Age: 69
Setting detail: INFUSION SERIES
Discharge: HOME | End: 2019-11-27

## 2019-11-27 VITALS
OXYGEN SATURATION: 97 % | DIASTOLIC BLOOD PRESSURE: 74 MMHG | RESPIRATION RATE: 18 BRPM | SYSTOLIC BLOOD PRESSURE: 135 MMHG | HEART RATE: 82 BPM | TEMPERATURE: 98 F

## 2019-11-27 DIAGNOSIS — T45.1X5A CHEMOTHERAPY INDUCED NEUTROPENIA (HCC): ICD-10-CM

## 2019-11-27 DIAGNOSIS — D70.1 CHEMOTHERAPY INDUCED NEUTROPENIA (HCC): ICD-10-CM

## 2019-11-27 PROCEDURE — G0463 HOSPITAL OUTPT CLINIC VISIT: HCPCS

## 2019-12-05 ENCOUNTER — OFFICE VISIT (OUTPATIENT)
Dept: GYNECOLOGIC ONCOLOGY | Facility: CLINIC | Age: 69
End: 2019-12-05

## 2019-12-05 ENCOUNTER — HOSPITAL ENCOUNTER (OUTPATIENT)
Dept: ONCOLOGY | Facility: HOSPITAL | Age: 69
Setting detail: INFUSION SERIES
Discharge: HOME | End: 2019-12-05

## 2019-12-05 VITALS
TEMPERATURE: 97.6 F | RESPIRATION RATE: 16 BRPM | OXYGEN SATURATION: 97 % | DIASTOLIC BLOOD PRESSURE: 82 MMHG | WEIGHT: 224 LBS | SYSTOLIC BLOOD PRESSURE: 178 MMHG | BODY MASS INDEX: 33.08 KG/M2 | HEART RATE: 91 BPM

## 2019-12-05 VITALS — DIASTOLIC BLOOD PRESSURE: 79 MMHG | HEART RATE: 70 BPM | SYSTOLIC BLOOD PRESSURE: 148 MMHG

## 2019-12-05 DIAGNOSIS — B37.2 CANDIDAL DERMATITIS: ICD-10-CM

## 2019-12-05 DIAGNOSIS — D70.1 CHEMOTHERAPY INDUCED NEUTROPENIA (HCC): ICD-10-CM

## 2019-12-05 DIAGNOSIS — T45.1X5A CHEMOTHERAPY INDUCED NEUTROPENIA (HCC): ICD-10-CM

## 2019-12-05 DIAGNOSIS — C54.1 PAPILLARY SEROUS ADENOCARCINOMA OF ENDOMETRIUM (HCC): Primary | ICD-10-CM

## 2019-12-05 DIAGNOSIS — Z45.2: ICD-10-CM

## 2019-12-05 DIAGNOSIS — T45.1X5A NEUROPATHY DUE TO CHEMOTHERAPEUTIC DRUG (HCC): ICD-10-CM

## 2019-12-05 DIAGNOSIS — G62.0 NEUROPATHY DUE TO CHEMOTHERAPEUTIC DRUG (HCC): ICD-10-CM

## 2019-12-05 LAB
ALBUMIN SERPL-MCNC: 4.3 G/DL (ref 3.5–5.2)
ALBUMIN/GLOB SERPL: 1.3 G/DL
ALP SERPL-CCNC: 96 U/L (ref 39–117)
ALT SERPL W P-5'-P-CCNC: 20 U/L (ref 1–33)
ANION GAP SERPL CALCULATED.3IONS-SCNC: 18 MMOL/L (ref 5–15)
AST SERPL-CCNC: 19 U/L (ref 1–32)
BILIRUB SERPL-MCNC: 0.3 MG/DL (ref 0.2–1.2)
BUN BLD-MCNC: 20 MG/DL (ref 8–23)
BUN/CREAT SERPL: 25.3 (ref 7–25)
CALCIUM SPEC-SCNC: 9.8 MG/DL (ref 8.6–10.5)
CANCER AG125 SERPL QL: 25.1 U/ML (ref 0–38.1)
CHLORIDE SERPL-SCNC: 99 MMOL/L (ref 98–107)
CO2 SERPL-SCNC: 22 MMOL/L (ref 22–29)
CREAT BLD-MCNC: 0.79 MG/DL (ref 0.57–1)
CREAT BLDA-MCNC: 0.8 MG/DL (ref 0.6–1.3)
ERYTHROCYTE [DISTWIDTH] IN BLOOD BY AUTOMATED COUNT: 22.3 % (ref 12.3–15.4)
GFR SERPL CREATININE-BSD FRML MDRD: 72 ML/MIN/1.73
GLOBULIN UR ELPH-MCNC: 3.2 GM/DL
GLUCOSE BLD-MCNC: 172 MG/DL (ref 65–99)
GLUCOSE BLDC GLUCOMTR-MCNC: 159 MG/DL (ref 70–130)
HCT VFR BLD AUTO: 30 % (ref 34–46.6)
HGB BLD-MCNC: 9.7 G/DL (ref 12–15.9)
LYMPHOCYTES # BLD AUTO: 1.3 10*3/MM3 (ref 0.7–3.1)
LYMPHOCYTES NFR BLD AUTO: 18.6 % (ref 19.6–45.3)
MCH RBC QN AUTO: 30.8 PG (ref 26.6–33)
MCHC RBC AUTO-ENTMCNC: 32.3 G/DL (ref 31.5–35.7)
MCV RBC AUTO: 95.2 FL (ref 79–97)
MONOCYTES # BLD AUTO: 0.2 10*3/MM3 (ref 0.1–0.9)
MONOCYTES NFR BLD AUTO: 3 % (ref 5–12)
NEUTROPHILS # BLD AUTO: 5.6 10*3/MM3 (ref 1.7–7)
NEUTROPHILS NFR BLD AUTO: 78.4 % (ref 42.7–76)
PLATELET # BLD AUTO: 198 10*3/MM3 (ref 140–450)
PMV BLD AUTO: 5.8 FL (ref 6–12)
POTASSIUM BLD-SCNC: 4.5 MMOL/L (ref 3.5–5.2)
PROT SERPL-MCNC: 7.5 G/DL (ref 6–8.5)
RBC # BLD AUTO: 3.15 10*6/MM3 (ref 3.77–5.28)
SODIUM BLD-SCNC: 139 MMOL/L (ref 136–145)
WBC NRBC COR # BLD: 7.2 10*3/MM3 (ref 3.4–10.8)

## 2019-12-05 PROCEDURE — 96413 CHEMO IV INFUSION 1 HR: CPT

## 2019-12-05 PROCEDURE — 96375 TX/PRO/DX INJ NEW DRUG ADDON: CPT

## 2019-12-05 PROCEDURE — 25010000002 PACLITAXEL PER 30 MG: Performed by: OBSTETRICS & GYNECOLOGY

## 2019-12-05 PROCEDURE — G0463 HOSPITAL OUTPT CLINIC VISIT: HCPCS

## 2019-12-05 PROCEDURE — 96417 CHEMO IV INFUS EACH ADDL SEQ: CPT

## 2019-12-05 PROCEDURE — 99215 OFFICE O/P EST HI 40 MIN: CPT | Performed by: OBSTETRICS & GYNECOLOGY

## 2019-12-05 PROCEDURE — 25010000002 DEXAMETHASONE PER 1 MG: Performed by: OBSTETRICS & GYNECOLOGY

## 2019-12-05 PROCEDURE — 96366 THER/PROPH/DIAG IV INF ADDON: CPT

## 2019-12-05 PROCEDURE — 25010000002 CARBOPLATIN PER 50 MG: Performed by: OBSTETRICS & GYNECOLOGY

## 2019-12-05 PROCEDURE — 80053 COMPREHEN METABOLIC PANEL: CPT | Performed by: OBSTETRICS & GYNECOLOGY

## 2019-12-05 PROCEDURE — 82962 GLUCOSE BLOOD TEST: CPT

## 2019-12-05 PROCEDURE — 25010000002 DIPHENHYDRAMINE PER 50 MG: Performed by: OBSTETRICS & GYNECOLOGY

## 2019-12-05 PROCEDURE — 86304 IMMUNOASSAY TUMOR CA 125: CPT | Performed by: OBSTETRICS & GYNECOLOGY

## 2019-12-05 PROCEDURE — 85025 COMPLETE CBC W/AUTO DIFF WBC: CPT | Performed by: OBSTETRICS & GYNECOLOGY

## 2019-12-05 PROCEDURE — 96415 CHEMO IV INFUSION ADDL HR: CPT

## 2019-12-05 PROCEDURE — 63710000001 INSULIN LISPRO (HUMAN) PER 5 UNITS: Performed by: OBSTETRICS & GYNECOLOGY

## 2019-12-05 PROCEDURE — 96367 TX/PROPH/DG ADDL SEQ IV INF: CPT

## 2019-12-05 PROCEDURE — 82565 ASSAY OF CREATININE: CPT

## 2019-12-05 PROCEDURE — 25010000002 FOSAPREPITANT PER 1 MG: Performed by: OBSTETRICS & GYNECOLOGY

## 2019-12-05 PROCEDURE — 25010000002 PALONOSETRON 0.25 MG/5ML SOLUTION PREFILLED SYRINGE: Performed by: OBSTETRICS & GYNECOLOGY

## 2019-12-05 RX ORDER — SODIUM CHLORIDE 9 MG/ML
250 INJECTION, SOLUTION INTRAVENOUS ONCE
Status: CANCELLED | OUTPATIENT
Start: 2019-12-05

## 2019-12-05 RX ORDER — SODIUM CHLORIDE 9 MG/ML
250 INJECTION, SOLUTION INTRAVENOUS ONCE
Status: COMPLETED | OUTPATIENT
Start: 2019-12-05 | End: 2019-12-05

## 2019-12-05 RX ORDER — PALONOSETRON 0.05 MG/ML
0.25 INJECTION, SOLUTION INTRAVENOUS ONCE
Status: COMPLETED | OUTPATIENT
Start: 2019-12-05 | End: 2019-12-05

## 2019-12-05 RX ORDER — PALONOSETRON 0.05 MG/ML
0.25 INJECTION, SOLUTION INTRAVENOUS ONCE
Status: CANCELLED | OUTPATIENT
Start: 2019-12-05

## 2019-12-05 RX ORDER — DIPHENHYDRAMINE HYDROCHLORIDE 50 MG/ML
50 INJECTION INTRAMUSCULAR; INTRAVENOUS AS NEEDED
Status: CANCELLED | OUTPATIENT
Start: 2019-12-05

## 2019-12-05 RX ORDER — FAMOTIDINE 10 MG/ML
20 INJECTION, SOLUTION INTRAVENOUS AS NEEDED
Status: CANCELLED | OUTPATIENT
Start: 2019-12-05

## 2019-12-05 RX ORDER — NYSTATIN 100000 [USP'U]/G
POWDER TOPICAL 3 TIMES DAILY PRN
Qty: 30 G | Refills: 1 | Status: SHIPPED | OUTPATIENT
Start: 2019-12-05 | End: 2022-12-29 | Stop reason: SDUPTHER

## 2019-12-05 RX ORDER — FAMOTIDINE 10 MG/ML
20 INJECTION, SOLUTION INTRAVENOUS ONCE
Status: COMPLETED | OUTPATIENT
Start: 2019-12-05 | End: 2019-12-05

## 2019-12-05 RX ORDER — FAMOTIDINE 10 MG/ML
20 INJECTION, SOLUTION INTRAVENOUS ONCE
Status: CANCELLED | OUTPATIENT
Start: 2019-12-05

## 2019-12-05 RX ORDER — ENALAPRILAT 2.5 MG/2ML
0.62 INJECTION INTRAVENOUS ONCE
Status: CANCELLED
Start: 2019-12-05 | End: 2019-12-05

## 2019-12-05 RX ADMIN — SODIUM CHLORIDE 300 MG: 9 INJECTION, SOLUTION INTRAVENOUS at 11:19

## 2019-12-05 RX ADMIN — DEXAMETHASONE SODIUM PHOSPHATE 20 MG: 4 INJECTION, SOLUTION INTRAMUSCULAR; INTRAVENOUS at 10:38

## 2019-12-05 RX ADMIN — DIPHENHYDRAMINE HYDROCHLORIDE 50 MG: 50 INJECTION INTRAMUSCULAR; INTRAVENOUS at 10:38

## 2019-12-05 RX ADMIN — FAMOTIDINE 20 MG: 10 INJECTION, SOLUTION INTRAVENOUS at 10:38

## 2019-12-05 RX ADMIN — PALONOSETRON 0.25 MG: 0.25 INJECTION, SOLUTION INTRAVENOUS at 10:38

## 2019-12-05 RX ADMIN — INSULIN LISPRO 2 UNITS: 100 INJECTION, SOLUTION INTRAVENOUS; SUBCUTANEOUS at 12:06

## 2019-12-05 RX ADMIN — CARBOPLATIN 590 MG: 10 INJECTION, SOLUTION INTRAVENOUS at 14:31

## 2019-12-05 RX ADMIN — SODIUM CHLORIDE 150 MG: 9 INJECTION, SOLUTION INTRAVENOUS at 10:57

## 2019-12-05 RX ADMIN — SODIUM CHLORIDE 250 ML: 9 INJECTION, SOLUTION INTRAVENOUS at 10:38

## 2019-12-05 NOTE — PROGRESS NOTES
Hailey Osborn  [id number]  1950      Reason for visit: Serous endometrial cancer, chemotherapy-induced neuropathy, consideration of chemotherapy cycle #6     History of present illness:  The patient is a 69 y.o. year old female who presents today for treatment and evaluation of the above issues.    Patient reports doing well today.  She has no complaints or concerns. She reports good bowel and bladder function.  She notes good appetite and energy.  She is tolerating p.o. well.  Her neuropathy is improved with medical management.  She denies painful neuropathy and reports that she only occasionally has tingling in her feet bilaterally. She notes bilateral foot swelling after Thanksgiving that has since resolved. No issues with PICC line.     Oncologic History:     Papillary serous adenocarcinoma of endometrium (CMS/HCC)    7/22/2019 Biopsy     Patient presented to gyn, Dr. Patel, with c/o vaginal bleeding x 1 year. EMB revealed high grade papillary serous endometrial cancer.          7/28/2019 -  Other Event     While outpatient Gyn Oncology referral was pending, patient developed heavier vaginal bleeding and symptomatic anemia requiring hospital admission. Inpatient Gyn Oncology consult. Anemia managed with blood transfusions.   CA-125 = 9.8         7/29/2019 Imaging     Pre-op CT negative for chest disease or obvious metastatic disease. Enlarged uterus with underlying mass noted with several small questionable nodes.          8/2/2019 Surgery     RTLH/BSO, omentectomy, and bilateral pelvic lymph node dissection. Final pathology revealed papillary serous adenocarcinoma with invasion into 1.7 cm of 2.5 cm myometrium. No cervical involvement. Tubes, ovaries, omentum, and all nodes negative. No LVSI. MSI normal. Stage IB grade 3    Reflex testing reveals HER2+           8/23/2019 -  Chemotherapy     OP UTERINE PACLitaxel / CARBOplatin (Q21D)  Dose reduction of carboplatin with cycle #2 due to neuropathy.          2019 - 2019 Radiation     Radiation OncologyTreatment Course:  Hailey Osborn received 3000 cGy in 5 fractions to upper vagina via High Dose Radiation - HDR.              Past Medical History:   Diagnosis Date   • Endometrial cancer (CMS/HCC)     Stage IB grade 3 pap serous   • Hyperlipidemia    • Hypertension    • Murmur    • Pre-diabetes        Past Surgical History:   Procedure Laterality Date   • APPENDECTOMY     •  SECTION     • NODE DISSECTION LAPAROSCOPIC N/A 2019    Procedure: PELVIC LYMPH NODE DISSECTION LAPAROSCOPIC WITH DAVINCI ROBOT BILATERAL;  Surgeon: Lolis Aquino MD;  Location: [facility] OR;  Service: DaVinci   • TOTAL LAPAROSCOPIC HYSTERECTOMY N/A 2019    Procedure: RADICAL TOTAL LAPAROSCOPIC HYSTERECTOMY BILATERAL SALPINGECTOMY WITH DAVINCI ROBOT, OMENTUMECTOMY;  Surgeon: Lolis Aquino MD;  Location: [facility] OR;  Service: DaVinci       MEDICATIONS: The current medication list was reviewed with the patient and updated in the EMR this date per the Medical Assistant. Medication dosages and frequencies were confirmed to be accurate.      Allergies:  is allergic to codeine.    Social History:   Social History     Socioeconomic History   • Marital status:      Spouse name: Benedicto   • Number of children: Not on file   • Years of education: Not on file   • Highest education level: Not on file   Tobacco Use   • Smoking status: Former Smoker     Last attempt to quit:      Years since quittin.9   • Smokeless tobacco: Never Used   • Tobacco comment: quit    Substance and Sexual Activity   • Alcohol use: No     Frequency: Never   • Drug use: No   • Sexual activity: Defer   Social History Narrative    Lives in [facility] with [facility]       Family History:    Family History   Problem Relation Age of Onset   • Pancreatic cancer Brother        Health Maintenance:    Health Maintenance   Topic Date Due   • MAMMOGRAM  1950   • URINE MICROALBUMIN  1950   •  ZOSTER VACCINE (1 of 2) 08/06/2000   • PNEUMOCOCCAL VACCINES (65+ LOW/MEDIUM RISK) (1 of 2 - PCV13) 08/06/2015   • TDAP/TD VACCINES (2 - Td) 01/21/2019   • INFLUENZA VACCINE  08/01/2019   • HEPATITIS C SCREENING  08/04/2019   • MEDICARE ANNUAL WELLNESS  08/04/2019   • DIABETIC FOOT EXAM  08/04/2019   • LIPID PANEL  08/04/2019   • DIABETIC EYE EXAM  08/04/2019   • COLONOSCOPY  08/04/2019   • HEMOGLOBIN A1C  01/29/2020     Review of Systems   Constitutional: Negative for appetite change, chills, fatigue, fever and unexpected weight change.   HENT: Negative for congestion, hearing loss, sore throat and trouble swallowing.    Eyes: Negative for redness and visual disturbance.   Respiratory: Negative for cough, shortness of breath and wheezing.    Cardiovascular: Negative for chest pain, palpitations and leg swelling.   Gastrointestinal: Negative for abdominal distention, abdominal pain, blood in stool, constipation, diarrhea, nausea and vomiting.   Endocrine: Negative.  Negative for cold intolerance and heat intolerance.   Genitourinary: Negative.  Negative for difficulty urinating, dyspareunia, dysuria, frequency, genital sores, hematuria, pelvic pain, urgency, vaginal bleeding, vaginal discharge and vaginal pain.   Musculoskeletal: Negative for arthralgias, back pain, gait problem and joint swelling.   Skin: Negative.  Negative for rash and wound.   Allergic/Immunologic: Negative.  Negative for food allergies and immunocompromised state.   Neurological: Positive for numbness. Negative for dizziness, seizures, syncope, weakness, light-headedness and headaches.   Hematological: Negative for adenopathy. Does not bruise/bleed easily.   Psychiatric/Behavioral: Negative.  Negative for confusion, dysphoric mood and sleep disturbance. The patient is not nervous/anxious.    All other systems reviewed and are negative.      Physical Exam    Vitals:    12/05/19 0906   BP: 178/82   Pulse: 91   Resp: 16   Temp: 97.6 °F (36.4 °C)    TempSrc: Temporal   SpO2: 97%   Weight: 102 kg (224 lb)   PainSc: 0-No pain     Body mass index is 33.08 kg/m².    Wt Readings from Last 3 Encounters:   12/05/19 102 kg (224 lb)   11/14/19 102 kg (225 lb)   10/31/19 101 kg (223 lb)         GENERAL: Alert, well-appearing female appearing her stated age who is in no apparent distress.   HEENT: Sclera anicteric. Head normocephalic, atraumatic. Mucus membranes moist.  Alopecia  NECK: Trachea midline, supple, without masses.  No thyromegaly.   BREASTS: Deferred  CARDIOVASCULAR: Normal rate, regular rhythm, no murmurs, rubs, or gallops.  No peripheral edema.  RESPIRATORY: Clear to auscultation bilaterally, normal respiratory effort  BACK:  No CVA tenderness, no vertebral tenderness on palpation  GASTROINTESTINAL:  Abdomen is soft, non-tender, non-distended, no rebound or guarding, no masses, or hernias. No HSM.  Incisions well-healed  SKIN:  Warm, dry, well-perfused.  All visible areas intact.  No rashes, lesions, ulcers. Erythema under pannus consistent with yeast.   PSYCHIATRIC: AO x3, with appropriate affect, normal thought processes.  NEUROLOGIC: No focal deficits.  Moves extremities well.  MUSCULOSKELETAL: Normal gait and station.   EXTREMITIES:   No cyanosis, clubbing, symmetric.  PICC RUE  LYMPHATICS:  No cervical or inguinal adenopathy noted.     ECOG PS 0    PROCEDURES:  none    Diagnostic Data:        Lab Results   Component Value Date    WBC 7.20 12/05/2019    HGB 9.7 (L) 12/05/2019    HCT 30.0 (L) 12/05/2019    MCV 95.2 12/05/2019     12/05/2019    NEUTROABS 5.60 12/05/2019    GLUCOSE 172 (H) 12/05/2019    BUN 20 12/05/2019    CREATININE 0.80 12/05/2019    EGFRIFNONA 72 12/05/2019     12/05/2019    K 4.5 12/05/2019    CL 99 12/05/2019    CO2 22.0 12/05/2019    CALCIUM 9.8 12/05/2019    ALBUMIN 4.30 12/05/2019    AST 19 12/05/2019    ALT 20 12/05/2019    BILITOT 0.3 12/05/2019     Lab Results   Component Value Date     22.2 11/14/2019      22.7 10/24/2019     29.5 10/03/2019     115.4 (H) 09/03/2019     197.3 (H) 08/23/2019     9.8 07/30/2019           Assessment/Plan   This is a 69 y.o. woman with serous endometrial cancer who presents for consideration of chemotherapy, cycle #6     Serous endometrial cancer  - Continue combined carboplatin and AUC of 6+ paclitaxel 175 mg/m² IV q. 21 days for total of 6 cycles.  Cycle #6 today.  Proceed pending laboratory evaluation.  - Plan to obtain CT scan in 4 weeks     Chemotherapy induced neutropenia  - Insurance did not approve GCSF  - Improved with dose reduction of carboplatin  - Continue to observe    Chemotherapy induced neuropathy  - Doing well with gabapentin  - Given the severity of her symptoms after cycle #1, carboplatin was dose reduced     IV access: PICC line  - Plan to remove PICC line today after completion of chemotherapy     DM/pre DM  - Sliding scale insulin with chemotherapy as needed due to hyperglycemia with steroid effect    Cutaneous Candidiasis   - Nystain powder PRN    Pain assessment was performed today as a part of patient’s care.  For patients with pain related to surgery, gynecologic malignancy or cancer treatment, the plan is as noted in the assessment/plan.  For patients with pain not related to these issues, they are to seek any further needed care from a more appropriate provider, such as PCP.    Orders Placed This Encounter   Procedures   • CT Abdomen Pelvis With Contrast     Standing Status:   Future     Standing Expiration Date:   12/5/2020     Scheduling Instructions:      Oral & IV contrast     Order Specific Question:   Will Oral Contrast be needed for this procedure?     Answer:   Yes   • CT Chest With Contrast     Standing Status:   Future     Standing Expiration Date:   12/5/2020   •      Standing Status:   Future     Number of Occurrences:   1     Standing Expiration Date:   12/4/2020   • Comprehensive metabolic panel     Standing  Status:   Future     Number of Occurrences:   1     Standing Expiration Date:   12/4/2020   • CBC & Differential     Standing Status:   Future     Standing Expiration Date:   12/5/2020     Order Specific Question:   Manual Differential     Answer:   No   • CBC and Differential     Standing Status:   Future     Number of Occurrences:   1     Standing Expiration Date:   12/4/2020     Order Specific Question:   Manual Differential     Answer:   No     FOLLOW UP: 4 weeks for discussion of CT results; survivorship scheduled for 4 months from now    Patient was seen and examined with Dr. Moran,  resident, who performed portions of the examination and documentation for this patient's care under my direct supervision.  I agree with the above documentation and plan.    Lolis Aquino MD  12/05/19  1:22 PM

## 2019-12-12 ENCOUNTER — HOSPITAL ENCOUNTER (OUTPATIENT)
Dept: ONCOLOGY | Facility: HOSPITAL | Age: 69
Setting detail: INFUSION SERIES
Discharge: HOME | End: 2019-12-12

## 2019-12-12 ENCOUNTER — TELEPHONE (OUTPATIENT)
Dept: GYNECOLOGIC ONCOLOGY | Facility: CLINIC | Age: 69
End: 2019-12-12

## 2019-12-12 VITALS
HEIGHT: 69 IN | RESPIRATION RATE: 16 BRPM | WEIGHT: 221 LBS | DIASTOLIC BLOOD PRESSURE: 80 MMHG | SYSTOLIC BLOOD PRESSURE: 177 MMHG | TEMPERATURE: 97.2 F | BODY MASS INDEX: 32.73 KG/M2 | HEART RATE: 87 BPM

## 2019-12-12 DIAGNOSIS — C54.1 PAPILLARY SEROUS ADENOCARCINOMA OF ENDOMETRIUM (HCC): ICD-10-CM

## 2019-12-12 LAB
ERYTHROCYTE [DISTWIDTH] IN BLOOD BY AUTOMATED COUNT: 21.2 % (ref 12.3–15.4)
HCT VFR BLD AUTO: 28.3 % (ref 34–46.6)
HGB BLD-MCNC: 9 G/DL (ref 12–15.9)
LYMPHOCYTES # BLD AUTO: 1.4 10*3/MM3 (ref 0.7–3.1)
LYMPHOCYTES NFR BLD AUTO: 37.4 % (ref 19.6–45.3)
MCH RBC QN AUTO: 31.4 PG (ref 26.6–33)
MCHC RBC AUTO-ENTMCNC: 31.8 G/DL (ref 31.5–35.7)
MCV RBC AUTO: 98.8 FL (ref 79–97)
MONOCYTES # BLD AUTO: 0.1 10*3/MM3 (ref 0.1–0.9)
MONOCYTES NFR BLD AUTO: 2.9 % (ref 5–12)
NEUTROPHILS # BLD AUTO: 2.3 10*3/MM3 (ref 1.7–7)
NEUTROPHILS NFR BLD AUTO: 59.7 % (ref 42.7–76)
PLATELET # BLD AUTO: 173 10*3/MM3 (ref 140–450)
PMV BLD AUTO: 7.1 FL (ref 6–12)
RBC # BLD AUTO: 2.86 10*6/MM3 (ref 3.77–5.28)
WBC NRBC COR # BLD: 3.8 10*3/MM3 (ref 3.4–10.8)

## 2019-12-12 PROCEDURE — 85025 COMPLETE CBC W/AUTO DIFF WBC: CPT | Performed by: OBSTETRICS & GYNECOLOGY

## 2019-12-12 PROCEDURE — 36415 COLL VENOUS BLD VENIPUNCTURE: CPT

## 2019-12-12 NOTE — PROGRESS NOTES
Reviewed pt's hgb 9.0 with , no blood transfusion needed at this time. Instructed pt if she develops SOA or increased levels of fatigue to call 's office. Pt verbalized understanding. Agata Bradley RN

## 2019-12-12 NOTE — TELEPHONE ENCOUNTER
Pt called.  She took her metoprolol, lisinopril,  Isosorbide, promethazine 25mg and benadryl 50mg this morning.  Shortly after that she became dizzy, sweaty and her blood pressure dropped from 177/85 to 106/53.  It has been a couple of hours since she felt dizzy and sweaty and BP is now 111/61 with heart rate of 66.  I told her not take anymore medications, call her cardiologist who manages her blood pressure medications, have her  stay with her and if dizziness, sweating, blood pressure or heart rate drops or other concerning new symptoms call 911.  She agreed and verbalized understanding.

## 2019-12-25 NOTE — PROGRESS NOTES
Hailey Osborn  [id number]  1950      Reason for visit: Serous endometrial cancer, chemotherapy-induced neuropathy, discussion regarding CT scan results    History of present illness:  The patient is a 69 y.o. year old female who presents today for treatment and evaluation of the above issues.    PICC line removed.  Patient notes that her neuropathy is dramatically improved.  She has questions about her gabapentin dosing.  She has questions about her CT scan results.  She was ill last week and had a pretty significant cough which is completely resolved.  She said it was so bad she felt like she was going to pass out sometimes from coughing.  She was seen in urgent treatment care center and told that she had a viral infection.  She did not take antibiotics.    Oncologic History:     Papillary serous adenocarcinoma of endometrium (CMS/HCC)    7/22/2019 Biopsy     Patient presented to gyn, Dr. Patel, with c/o vaginal bleeding x 1 year. EMB revealed high grade papillary serous endometrial cancer.       7/28/2019 -  Other Event     While outpatient Gyn Oncology referral was pending, patient developed heavier vaginal bleeding and symptomatic anemia requiring hospital admission. Inpatient Gyn Oncology consult. Anemia managed with blood transfusions.   CA-125 = 9.8      7/29/2019 Imaging     Pre-op CT negative for chest disease or obvious metastatic disease. Enlarged uterus with underlying mass noted with several small questionable nodes.       8/2/2019 Surgery     RTLH/BSO, omentectomy, and bilateral pelvic lymph node dissection. Final pathology revealed papillary serous adenocarcinoma with invasion into 1.7 cm of 2.5 cm myometrium. No cervical involvement. Tubes, ovaries, omentum, and all nodes negative. No LVSI. MSI normal. Stage IB grade 3    Reflex testing reveals HER2+        8/23/2019 - 12/5/2019 Chemotherapy     OP UTERINE PACLitaxel / CARBOplatin (Q21D)  Dose reduction of carboplatin and paclitaxel due  to neuropathy.  Treated with gabapentin.      2019 - 2019 Radiation     Radiation OncologyTreatment Course:  Hailey Osborn received 3000 cGy in 5 fractions to upper vagina via High Dose Radiation - HDR.      2019 Imaging     CT scan chest/abdomen/pelvis: Likely postinflammatory changes of left lung noted.  Repeat CT scan of chest ordered.           Past Medical History:   Diagnosis Date   • Endometrial cancer (CMS/HCC)     Stage IB grade 3 pap serous   • Hyperlipidemia    • Hypertension    • Murmur    • Pre-diabetes        Past Surgical History:   Procedure Laterality Date   • APPENDECTOMY     •  SECTION     • NODE DISSECTION LAPAROSCOPIC N/A 2019    Procedure: PELVIC LYMPH NODE DISSECTION LAPAROSCOPIC WITH DAVINCI ROBOT BILATERAL;  Surgeon: Lolis Aquino MD;  Location: [facility] OR;  Service: DaVinci   • TOTAL LAPAROSCOPIC HYSTERECTOMY N/A 2019    Procedure: RADICAL TOTAL LAPAROSCOPIC HYSTERECTOMY BILATERAL SALPINGECTOMY WITH DAVINCI ROBOT, OMENTUMECTOMY;  Surgeon: Lolis Aquino MD;  Location: [facility] OR;  Service: DaVinci       MEDICATIONS: The current medication list was reviewed with the patient and updated in the EMR this date per the Medical Assistant. Medication dosages and frequencies were confirmed to be accurate.      Allergies:  is allergic to codeine.    Social History:   Social History     Socioeconomic History   • Marital status:      Spouse name: Benedicto   • Number of children: Not on file   • Years of education: Not on file   • Highest education level: Not on file   Tobacco Use   • Smoking status: Former Smoker     Last attempt to quit:      Years since quittin.0   • Smokeless tobacco: Never Used   • Tobacco comment: quit    Substance and Sexual Activity   • Alcohol use: No     Frequency: Never   • Drug use: No   • Sexual activity: Defer   Social History Narrative    Lives in [facility] with spouse       Family History:    Family History    Problem Relation Age of Onset   • Pancreatic cancer Brother        Health Maintenance:    Health Maintenance   Topic Date Due   • MAMMOGRAM  1950   • URINE MICROALBUMIN  1950   • TDAP/TD VACCINES (1 - Tdap) 08/06/1961   • ZOSTER VACCINE (1 of 2) 08/06/2000   • PNEUMOCOCCAL VACCINES (65+ LOW/MEDIUM RISK) (1 of 2 - PCV13) 08/06/2015   • INFLUENZA VACCINE  08/01/2019   • HEPATITIS C SCREENING  08/04/2019   • MEDICARE ANNUAL WELLNESS  08/04/2019   • DIABETIC FOOT EXAM  08/04/2019   • LIPID PANEL  08/04/2019   • DIABETIC EYE EXAM  08/04/2019   • COLONOSCOPY  08/04/2019   • HEMOGLOBIN A1C  01/29/2020     Review of Systems   Constitutional: Negative for appetite change, chills, fatigue, fever and unexpected weight change.   HENT: Negative for congestion, hearing loss, sore throat and trouble swallowing.    Eyes: Negative for redness and visual disturbance.   Respiratory: Negative for cough, shortness of breath and wheezing.    Cardiovascular: Negative for chest pain, palpitations and leg swelling.   Gastrointestinal: Negative for abdominal distention, abdominal pain, blood in stool, constipation, diarrhea, nausea and vomiting.   Endocrine: Negative.  Negative for cold intolerance and heat intolerance.   Genitourinary: Negative.  Negative for difficulty urinating, dyspareunia, dysuria, frequency, genital sores, hematuria, pelvic pain, urgency, vaginal bleeding, vaginal discharge and vaginal pain.   Musculoskeletal: Negative for arthralgias, back pain, gait problem and joint swelling.   Skin: Negative.  Negative for rash and wound.   Allergic/Immunologic: Negative.  Negative for food allergies and immunocompromised state.   Neurological: Positive for numbness. Negative for dizziness, seizures, syncope, weakness, light-headedness and headaches.   Hematological: Negative for adenopathy. Does not bruise/bleed easily.   Psychiatric/Behavioral: Negative.  Negative for confusion, dysphoric mood and sleep disturbance.  The patient is not nervous/anxious.    All other systems reviewed and are negative.      Physical Exam    Vitals:    12/26/19 1309   BP: (!) 189/80   Pulse: 111   Resp: 12   Temp: 98.2 °F (36.8 °C)   TempSrc: Temporal   SpO2: 96%   Weight: 103 kg (226 lb)   PainSc: 0-No pain     Body mass index is 33.36 kg/m².    Wt Readings from Last 3 Encounters:   12/26/19 103 kg (226 lb)   12/12/19 100 kg (221 lb)   12/05/19 102 kg (224 lb)         GENERAL: Alert, well-appearing female appearing her stated age who is in no apparent distress.   HEENT: Sclera anicteric. Head normocephalic, atraumatic. Mucus membranes moist.  Alopecia  NECK: Deferred   BREASTS: Deferred  CARDIOVASCULAR: Deferred  RESPIRATORY: Deferred  BACK:  Deferred  GASTROINTESTINAL:  Deferred  SKIN:  Warm, dry, well-perfused.  All visible areas intact.  No rashes, lesions, ulcers.   PSYCHIATRIC: AO x3, with appropriate affect, normal thought processes.  NEUROLOGIC: No focal deficits.  Moves extremities well.  MUSCULOSKELETAL: Normal gait and station.   EXTREMITIES:   No cyanosis, clubbing, symmetric.    LYMPHATICS:  Deferred     ECOG PS 0    PROCEDURES:  none    Diagnostic Data:      I personally looked at the images.  I see the areas of concern on the left.  No other disease noted.  Gallstones.    Ct Chest With Contrast    Result Date: 12/26/2019  Tiny fibronodular densities identified within the left mid and lower lung field which are new when compared to the prior study and concerning for early infectious process or possible early metastatic disease. The two nodules seen in the right lower lobe are stable. There is old healed granulomatous disease seen within the chest. Continued followup is recommended for resolution or stability. The remainder of the abdomen and pelvis is stable and unremarkable.  D:  12/26/2019 E:  12/26/2019  This report was finalized on 12/26/2019 1:11 PM by Dr. Tegan Richey MD.      Ct Abdomen Pelvis With Contrast    Result Date:  12/26/2019  Tiny fibronodular densities identified within the left mid and lower lung field which are new when compared to the prior study and concerning for early infectious process or possible early metastatic disease. The two nodules seen in the right lower lobe are stable. There is old healed granulomatous disease seen within the chest. Continued followup is recommended for resolution or stability. The remainder of the abdomen and pelvis is stable and unremarkable.  D:  12/26/2019 E:  12/26/2019  This report was finalized on 12/26/2019 1:11 PM by Dr. Tegan Richey MD.          Lab Results   Component Value Date    WBC 3.80 12/12/2019    HGB 9.0 (L) 12/12/2019    HCT 28.3 (L) 12/12/2019    MCV 98.8 (H) 12/12/2019     12/12/2019    NEUTROABS 2.30 12/12/2019    GLUCOSE 172 (H) 12/05/2019    BUN 20 12/05/2019    CREATININE 0.80 12/05/2019    EGFRIFNONA 72 12/05/2019     12/05/2019    K 4.5 12/05/2019    CL 99 12/05/2019    CO2 22.0 12/05/2019    CALCIUM 9.8 12/05/2019    ALBUMIN 4.30 12/05/2019    AST 19 12/05/2019    ALT 20 12/05/2019    BILITOT 0.3 12/05/2019     Lab Results   Component Value Date     25.1 12/05/2019     22.2 11/14/2019     22.7 10/24/2019     29.5 10/03/2019     115.4 (H) 09/03/2019     197.3 (H) 08/23/2019     9.8 07/30/2019           Assessment/Plan   This is a 69 y.o. woman with serous endometrial cancer who presents for follow up after 6 cycles.    Serous endometrial cancer  - S/p 6 cycles Carbo/Taxol.  -CT scan performed this morning; likely postinfectious changes of left lung.  Will order repeat scan of chest to be performed in approximately 3 months time.  Patient and  are pleased.    Chemotherapy induced neuropathy  - Doing well with gabapentin  -We discussed titration down of gabapentin doses.  She has 100 mg tablets and is taking a total of 300 twice daily.  Discussed backing down to 200 twice daily then 100 twice daily then  decreasing to nightly and then hopefully discontinuing completely.  Patient understands that she may continue to need gabapentin long-term for her chemotherapy-induced neuropathy.  She would like to come completely off the medication if possible.    Pain assessment was performed today as a part of patient’s care.  For patients with pain related to surgery, gynecologic malignancy or cancer treatment, the plan is as noted in the assessment/plan.  For patients with pain not related to these issues, they are to seek any further needed care from a more appropriate provider, such as PCP.    FOLLOW UP:  Survivorship in 3 months.    Lolis Aquino MD  12/26/19  1:22 PM

## 2019-12-26 ENCOUNTER — OFFICE VISIT (OUTPATIENT)
Dept: GYNECOLOGIC ONCOLOGY | Facility: CLINIC | Age: 69
End: 2019-12-26

## 2019-12-26 ENCOUNTER — HOSPITAL ENCOUNTER (OUTPATIENT)
Dept: CT IMAGING | Facility: HOSPITAL | Age: 69
Discharge: HOME | End: 2019-12-26
Admitting: OBSTETRICS & GYNECOLOGY

## 2019-12-26 VITALS
WEIGHT: 226 LBS | OXYGEN SATURATION: 96 % | TEMPERATURE: 98.2 F | DIASTOLIC BLOOD PRESSURE: 80 MMHG | SYSTOLIC BLOOD PRESSURE: 189 MMHG | BODY MASS INDEX: 33.36 KG/M2 | HEART RATE: 111 BPM | RESPIRATION RATE: 12 BRPM

## 2019-12-26 DIAGNOSIS — R91.1 LESION OF LEFT LUNG: ICD-10-CM

## 2019-12-26 DIAGNOSIS — C54.1 PAPILLARY SEROUS ADENOCARCINOMA OF ENDOMETRIUM (HCC): ICD-10-CM

## 2019-12-26 DIAGNOSIS — C54.1 PAPILLARY SEROUS ADENOCARCINOMA OF ENDOMETRIUM (HCC): Primary | ICD-10-CM

## 2019-12-26 PROCEDURE — 63710000001 BARIUM 2 % SUSPENSION: Performed by: OBSTETRICS & GYNECOLOGY

## 2019-12-26 PROCEDURE — 74177 CT ABD & PELVIS W/CONTRAST: CPT

## 2019-12-26 PROCEDURE — 71260 CT THORAX DX C+: CPT

## 2019-12-26 PROCEDURE — A9270 NON-COVERED ITEM OR SERVICE: HCPCS | Performed by: OBSTETRICS & GYNECOLOGY

## 2019-12-26 PROCEDURE — 25010000002 IOPAMIDOL 61 % SOLUTION: Performed by: OBSTETRICS & GYNECOLOGY

## 2019-12-26 PROCEDURE — 99213 OFFICE O/P EST LOW 20 MIN: CPT | Performed by: OBSTETRICS & GYNECOLOGY

## 2019-12-26 RX ADMIN — IOPAMIDOL 80 ML: 612 INJECTION, SOLUTION INTRAVENOUS at 11:15

## 2019-12-26 RX ADMIN — BARIUM SULFATE 450 ML: 21 SUSPENSION ORAL at 10:00

## 2019-12-27 DIAGNOSIS — G62.0 NEUROPATHY DUE TO CHEMOTHERAPEUTIC DRUG (HCC): Primary | ICD-10-CM

## 2019-12-27 DIAGNOSIS — T45.1X5A NEUROPATHY DUE TO CHEMOTHERAPEUTIC DRUG (HCC): Primary | ICD-10-CM

## 2019-12-27 RX ORDER — GABAPENTIN 300 MG/1
CAPSULE ORAL
Qty: 60 CAPSULE | Refills: 0 | Status: SHIPPED | OUTPATIENT
Start: 2019-12-27 | End: 2020-01-27 | Stop reason: SDUPTHER

## 2020-01-26 DIAGNOSIS — G62.0 NEUROPATHY DUE TO CHEMOTHERAPEUTIC DRUG (HCC): ICD-10-CM

## 2020-01-26 DIAGNOSIS — T45.1X5A NEUROPATHY DUE TO CHEMOTHERAPEUTIC DRUG (HCC): ICD-10-CM

## 2020-01-27 DIAGNOSIS — T45.1X5A NEUROPATHY DUE TO CHEMOTHERAPEUTIC DRUG (HCC): ICD-10-CM

## 2020-01-27 DIAGNOSIS — G62.0 NEUROPATHY DUE TO CHEMOTHERAPEUTIC DRUG (HCC): ICD-10-CM

## 2020-01-27 RX ORDER — GABAPENTIN 300 MG/1
CAPSULE ORAL
Qty: 60 CAPSULE | Refills: 0 | OUTPATIENT
Start: 2020-01-27

## 2020-01-27 RX ORDER — GABAPENTIN 300 MG/1
300 CAPSULE ORAL 2 TIMES DAILY
Qty: 60 CAPSULE | Refills: 0 | Status: SHIPPED | OUTPATIENT
Start: 2020-01-27 | End: 2020-02-17 | Stop reason: WASHOUT

## 2020-02-17 ENCOUNTER — TELEPHONE (OUTPATIENT)
Dept: GYNECOLOGIC ONCOLOGY | Facility: CLINIC | Age: 70
End: 2020-02-17

## 2020-02-17 RX ORDER — PANTOPRAZOLE SODIUM 40 MG/1
TABLET, DELAYED RELEASE ORAL
Qty: 30 TABLET | Refills: 2 | Status: SHIPPED | OUTPATIENT
Start: 2020-02-17 | End: 2020-05-19

## 2020-02-17 NOTE — TELEPHONE ENCOUNTER
Pt called. She had questions about pantoprazole that was refilled earlier this morning for her.  She also had questions about her cardiologist re-prescribing hydrochlorothiazide for her.  I advised she call that office for this medication.  She verbalized understanding and will call back with any questions or concerns.

## 2020-03-09 PROBLEM — E11.9 TYPE 2 OR UNSPECIFIED TYPE DIABETES MELLITUS: Status: ACTIVE | Noted: 2020-03-09

## 2020-03-10 ENCOUNTER — CLINICAL SUPPORT (OUTPATIENT)
Dept: GYNECOLOGIC ONCOLOGY | Facility: CLINIC | Age: 70
End: 2020-03-10

## 2020-03-10 VITALS
SYSTOLIC BLOOD PRESSURE: 180 MMHG | HEART RATE: 68 BPM | TEMPERATURE: 97.8 F | WEIGHT: 224 LBS | DIASTOLIC BLOOD PRESSURE: 74 MMHG | OXYGEN SATURATION: 93 % | BODY MASS INDEX: 33.06 KG/M2 | RESPIRATION RATE: 16 BRPM

## 2020-03-10 DIAGNOSIS — R60.0 EDEMA OF BOTH LEGS: ICD-10-CM

## 2020-03-10 DIAGNOSIS — G62.0 NEUROPATHY DUE TO CHEMOTHERAPEUTIC DRUG (HCC): ICD-10-CM

## 2020-03-10 DIAGNOSIS — T45.1X5A NEUROPATHY DUE TO CHEMOTHERAPEUTIC DRUG (HCC): ICD-10-CM

## 2020-03-10 DIAGNOSIS — C54.1 PAPILLARY SEROUS ADENOCARCINOMA OF ENDOMETRIUM (HCC): Primary | ICD-10-CM

## 2020-03-10 PROBLEM — D70.1 CHEMOTHERAPY INDUCED NEUTROPENIA (HCC): Status: RESOLVED | Noted: 2019-10-24 | Resolved: 2020-03-10

## 2020-03-10 PROCEDURE — 99215 OFFICE O/P EST HI 40 MIN: CPT | Performed by: NURSE PRACTITIONER

## 2020-03-10 RX ORDER — HYDROCHLOROTHIAZIDE 12.5 MG/1
12.5 TABLET ORAL DAILY
Qty: 30 TABLET | Refills: 1 | Status: SHIPPED | OUTPATIENT
Start: 2020-03-10 | End: 2020-05-05

## 2020-03-10 NOTE — PROGRESS NOTES
GYN ONCOLOGY CANCER SURVIVORSHIP VISIT    Hailey Osborn  [id number]  1950    Chief Complaint: Follow-up (Survivorship: bilateral leg swelling)        History of present illness:  Hailey Osborn is a 69 y.o. year old female who is here today for her Cancer Survivorship visit, see oncology history below. She is accompanied by her . She completed treatment and had her post-treatment scan in 12/2019. CT was negative for disease, but there were post-inflammatory changes seen at the left lung, repeat chest CT recommended. This has been previously ordered and is scheduled for 4/10/2020. She denies any respiratory changes or concerns.   Patient is feeling generally well since completion of treatment. Energy is improving. She has mild neuropathy that remains in bilateral feet, tolerable and unchanged. She has some BLE swelling, compression stockings and elevation are helpful. She has also taken HCTZ 12.5 mg PO as ok'd by her cardiologist and this has helped with BLE swelling as well. She is out of this medication, sees cardiologist next in May 2020.        Cancer History:      Papillary serous adenocarcinoma of endometrium (CMS/HCC)    7/22/2019 Biopsy     Patient presented to gyn, Dr. Patel, with c/o vaginal bleeding x 1 year. EMB revealed high grade papillary serous endometrial cancer.       7/28/2019 -  Other Event     While outpatient Gyn Oncology referral was pending, patient developed heavier vaginal bleeding and symptomatic anemia requiring hospital admission. Inpatient Gyn Oncology consult. Anemia managed with blood transfusions.   CA-125 = 9.8      7/29/2019 Imaging     Pre-op CT negative for chest disease or obvious metastatic disease. Enlarged uterus with underlying mass noted with several small questionable nodes.       8/2/2019 Surgery     RTLH/BSO, omentectomy, and bilateral pelvic lymph node dissection. Final pathology revealed papillary serous adenocarcinoma with invasion into 1.7 cm  of 2.5 cm myometrium. No cervical involvement. Tubes, ovaries, omentum, and all nodes negative. No LVSI. MSI normal. Stage IB grade 3    Reflex testing reveals HER2+        2019 - 2019 Chemotherapy     OP UTERINE PACLitaxel / CARBOplatin (Q21D)  Dose reduction of carboplatin and paclitaxel due to neuropathy.  Treated with gabapentin.      2019 - 2019 Radiation     Radiation OncologyTreatment Course:  Hailey Osborn received 3000 cGy in 5 fractions to upper vagina via High Dose Radiation - HDR.      2019 Imaging     CT scan chest/abdomen/pelvis: Likely postinflammatory changes of left lung noted.  Repeat CT scan of chest ordered.         Past Medical History:   Diagnosis Date   • Endometrial cancer (CMS/HCC)     Stage IB grade 3 pap serous   • Hyperlipidemia    • Hypertension    • Murmur    • Pre-diabetes        Past Surgical History:   Procedure Laterality Date   • APPENDECTOMY     •  SECTION     • NODE DISSECTION LAPAROSCOPIC N/A 2019    Procedure: PELVIC LYMPH NODE DISSECTION LAPAROSCOPIC WITH DAVINCI ROBOT BILATERAL;  Surgeon: Lolis Aquino MD;  Location: [facility] OR;  Service: DaVinci   • TOTAL LAPAROSCOPIC HYSTERECTOMY N/A 2019    Procedure: RADICAL TOTAL LAPAROSCOPIC HYSTERECTOMY BILATERAL SALPINGECTOMY WITH DAVINCI ROBOT, OMENTUMECTOMY;  Surgeon: Lolis Aquino MD;  Location:  [facility] OR;  Service: DaVinci       MEDICATIONS: The current medication list was reviewed and reconciled.     Allergies:  is allergic to codeine.    Family History   Problem Relation Age of Onset   • Pancreatic cancer Brother        Last imaging study was Post-treatment CT c/a/p 2019.   Tumor marker:     Date Value Ref Range Status   2019 25.1 0.0 - 38.1 U/mL Final   2019 22.2 0.0 - 38.1 U/mL Final   10/24/2019 22.7 0.0 - 38.1 U/mL Final   10/03/2019 29.5 0.0 - 38.1 U/mL Final       Review of Systems   Constitutional: Negative for appetite change, chills, fatigue,  fever and unexpected weight change.   Respiratory: Negative for cough, shortness of breath and wheezing.    Cardiovascular: Positive for leg swelling (BLE edema). Negative for chest pain and palpitations.   Gastrointestinal: Negative for abdominal distention, abdominal pain, blood in stool, constipation, diarrhea, nausea and vomiting.   Endocrine: Negative.    Genitourinary: Negative for dyspareunia, dysuria, frequency, genital sores, hematuria, pelvic pain, urgency, vaginal bleeding, vaginal discharge and vaginal pain.   Musculoskeletal: Negative for arthralgias, gait problem and joint swelling.   Neurological: Positive for numbness (mild neuropathy in feet). Negative for dizziness, seizures, syncope, weakness, light-headedness and headaches.   Hematological: Negative for adenopathy.   Psychiatric/Behavioral: Negative.        Depression screening: PHQ-9 Total Score: 0    Physical Exam  Vital Signs: /74   Pulse 68   Temp 97.8 °F (36.6 °C) (Temporal)   Resp 16   Wt 102 kg (224 lb)   SpO2 93%   BMI 33.06 kg/m²   Vitals:    03/10/20 1302   PainSc: 0-No pain           General Appearance:  alert, cooperative, no apparent distress and appears stated age   Neurologic/Psychiatric: A&O x 3, gait steady, appropriate affect   HEENT:  Normocephalic, without obvious abnormality, mucous membranes moist   Neck: Supple, symmetrical, trachea midline, no adenopathy;  No thyromegaly, masses, or tenderness   Back:   Symmetric, no curvature, ROM normal, no CVA tenderness   Lungs:   Clear to auscultation bilaterally; respirations regular, even, and unlabored bilaterally   Heart:  Regular rate and rhythm, no murmurs appreciated   Breasts:  deferred   Abdomen:   Soft, non-tender, non-distended and no organomegaly   Lymph nodes: No cervical, supraclavicular, inguinal or axillary adenopathy noted   Extremities: Normal, atraumatic; no clubbing, cyanosis. 1+ edema in BLE. Compression stockings to knee on bilaterally.     Pelvic:  External Genitalia  without lesions or skin changes  Vagina  is pale, atrophic.   Vaginal Cuff  Female Vaginal Cuff: smooth, intact and without visible lesions  Uterus  surgically absent and no palpable masses  Ovaries  surgically absent bilaterally  Parametria  smooth  Rectovaginal  Female rectovaginal: deferred     ECOG Performance Status: (0) Fully Active - Able to Carry On All Pre-disease Performance Without Restriction      Survivorship Needs Assessment:  PT/Rehab  Health history, treatment course, and current status. The patient is not in need of physical therapy or rehabilitation services. If BLE swelling worsens or suspicion for lymphedema, she may be referred in the future.     Behavioral Health  A post-treatment depression screening has been completed. PHQ-9 results show 0 (No Depression). The patient has previously established mental health care. A referral is not recommended at this time. The patient is not in need of [facility].      Procedure Note:  No notes on file      Assessment and Plan:  Hailey was seen today for follow-up.    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        There is no evidence of disease upon today's exam. We will plan for every 3 month visits for the first 2 years. She is understanding to call with any changes in pelvic symptoms or general GYN concerns at any time between regularly scheduled visits.     Repeat chest CT scheduled for 4/10/2020. She will be notified of results upon their return.     The patient and I have reviewed her Survivorship Care Plan in detail. We discussed her diagnosis, pathology, histology, all treatments, and ongoing surveillance recommendations. All questions were answered to her satisfaction. She is in agreement with our plan  for ongoing surveillance as outlined in her plan. A copy of this document was provided to her at the completion of our visit.  A copy has also been sent to her primary care provider.    Neuropathy stable. Compression stockings and HCTZ PRN helpful to control BLE edema. Patient scheduled to see cardiologist again in May 2020, refills of low-dose HCTZ given x2 months.     Pain assessment was performed today as a part of patient’s care. For patients with pain related to surgery, gynecologic malignancy or cancer treatment, the plan is as noted in the assessment/plan.  For patients with pain not related to these issues, they are to seek any further needed care from a more appropriate provider, such as PCP.    Advance Care Planning   ACP discussion was held with the patient during this visit. Patient does not have an advance directive, information provided.         Return to clinic in 3 months for ongoing cancer surveillance.      Pennie Mata, EVER      Addendum:  Tumor reflex testing showed ERBB2 amplification, consistent with HER2 positivity.   Reviewed with MD: Herceptin indicated in Stage III-IV disease up front or in the recurrent setting.   This is not currently indicated for patient, but would be considered if recurrence in the future.   No new changes, continue plan for surveillance as above.     EVER Back  03/10/2020

## 2020-04-03 ENCOUNTER — TELEPHONE (OUTPATIENT)
Dept: GYNECOLOGIC ONCOLOGY | Facility: CLINIC | Age: 70
End: 2020-04-03

## 2020-04-03 NOTE — TELEPHONE ENCOUNTER
Pt called requesting to speak with Daniela about possibly changing CT SCAN scheduled for 4/10/20.    Call pt back at [phone] in regards to this.

## 2020-04-10 ENCOUNTER — APPOINTMENT (OUTPATIENT)
Dept: CT IMAGING | Facility: HOSPITAL | Age: 70
End: 2020-04-10

## 2020-05-05 RX ORDER — HYDROCHLOROTHIAZIDE 12.5 MG/1
TABLET ORAL
Qty: 30 TABLET | Refills: 0 | Status: SHIPPED | OUTPATIENT
Start: 2020-05-05 | End: 2021-03-11 | Stop reason: ALTCHOICE

## 2020-05-19 RX ORDER — PANTOPRAZOLE SODIUM 40 MG/1
TABLET, DELAYED RELEASE ORAL
Qty: 30 TABLET | Refills: 1 | Status: SHIPPED | OUTPATIENT
Start: 2020-05-19 | End: 2020-07-17

## 2020-06-10 ENCOUNTER — OFFICE VISIT (OUTPATIENT)
Dept: GYNECOLOGIC ONCOLOGY | Facility: CLINIC | Age: 70
End: 2020-06-10

## 2020-06-10 ENCOUNTER — LAB (OUTPATIENT)
Dept: LAB | Facility: HOSPITAL | Age: 70
End: 2020-06-10

## 2020-06-10 ENCOUNTER — HOSPITAL ENCOUNTER (OUTPATIENT)
Dept: CT IMAGING | Facility: HOSPITAL | Age: 70
Discharge: HOME | End: 2020-06-10
Admitting: OBSTETRICS & GYNECOLOGY

## 2020-06-10 VITALS
BODY MASS INDEX: 34.24 KG/M2 | SYSTOLIC BLOOD PRESSURE: 163 MMHG | WEIGHT: 232 LBS | TEMPERATURE: 98.8 F | DIASTOLIC BLOOD PRESSURE: 72 MMHG | RESPIRATION RATE: 16 BRPM | HEART RATE: 70 BPM | OXYGEN SATURATION: 93 %

## 2020-06-10 DIAGNOSIS — R91.1 LESION OF LEFT LUNG: ICD-10-CM

## 2020-06-10 DIAGNOSIS — C54.1 PAPILLARY SEROUS ADENOCARCINOMA OF ENDOMETRIUM (HCC): ICD-10-CM

## 2020-06-10 DIAGNOSIS — C54.1 PAPILLARY SEROUS ADENOCARCINOMA OF ENDOMETRIUM (HCC): Primary | ICD-10-CM

## 2020-06-10 PROBLEM — Z45.2 PERIPHERALLY INSERTED CENTRAL VENOUS CATHETER IN SITU: Status: RESOLVED | Noted: 2019-08-23 | Resolved: 2020-06-10

## 2020-06-10 LAB — CANCER AG125 SERPL QL: 9.2 U/ML (ref 0–38.1)

## 2020-06-10 PROCEDURE — 71260 CT THORAX DX C+: CPT

## 2020-06-10 PROCEDURE — 36415 COLL VENOUS BLD VENIPUNCTURE: CPT

## 2020-06-10 PROCEDURE — 99214 OFFICE O/P EST MOD 30 MIN: CPT | Performed by: NURSE PRACTITIONER

## 2020-06-10 PROCEDURE — 25010000002 IOPAMIDOL 61 % SOLUTION: Performed by: OBSTETRICS & GYNECOLOGY

## 2020-06-10 PROCEDURE — 82565 ASSAY OF CREATININE: CPT

## 2020-06-10 PROCEDURE — 86304 IMMUNOASSAY TUMOR CA 125: CPT

## 2020-06-10 RX ADMIN — IOPAMIDOL 75 ML: 612 INJECTION, SOLUTION INTRAVENOUS at 11:27

## 2020-06-10 NOTE — PROGRESS NOTES
GYN ONCOLOGY CANCER SURVEILLANCE FOLLOW-UP    Hailey Osborn  [id number]  1950    Chief Complaint: Follow-up (no complaints)        History of present illness:  Hailey Osborn is a 69 y.o. 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 She underwent repeat CT chest earlier today for follow-up of lung nodule on her post-treatment scan. She is anxious for results. Otherwise, she is feeling generally well today. She denies vaginal bleeding, pelvic pain, concerning lesions, or changes in bowel or bladder function.         Cancer History:      Papillary serous adenocarcinoma of endometrium (CMS/HCC)    7/22/2019 Biopsy     Patient presented to gyn, Dr. Patel, with c/o vaginal bleeding x 1 year. EMB revealed high grade papillary serous endometrial cancer.       7/28/2019 -  Other Event     While outpatient Gyn Oncology referral was pending, patient developed heavier vaginal bleeding and symptomatic anemia requiring hospital admission. Inpatient Gyn Oncology consult. Anemia managed with blood transfusions.   CA-125 = 9.8      7/29/2019 Imaging     Pre-op CT negative for chest disease or obvious metastatic disease. Enlarged uterus with underlying mass noted with several small questionable nodes.       8/2/2019 Surgery     RTLH/BSO, omentectomy, and bilateral pelvic lymph node dissection. Final pathology revealed papillary serous adenocarcinoma with invasion into 1.7 cm of 2.5 cm myometrium. No cervical involvement. Tubes, ovaries, omentum, and all nodes negative. No LVSI. MSI normal. Stage IB grade 3    Reflex testing reveals HER2+        8/23/2019 - 12/5/2019 Chemotherapy     OP UTERINE PACLitaxel / CARBOplatin (Q21D)  Dose reduction of carboplatin and paclitaxel due to neuropathy.  Treated with gabapentin.      9/18/2019 - 9/30/2019 Radiation     Radiation OncologyTreatment Course:  Hailey Osborn received 3000 cGy in 5 fractions to upper  vagina via High Dose Radiation - HDR.      2019 Imaging     CT scan chest/abdomen/pelvis: Likely postinflammatory changes of left lung noted.  Repeat CT scan of chest ordered.         Past Medical History:   Diagnosis Date   • Endometrial cancer (CMS/HCC)     Stage IB grade 3 pap serous   • Hyperlipidemia    • Hypertension    • Murmur    • Pre-diabetes        Past Surgical History:   Procedure Laterality Date   • APPENDECTOMY     •  SECTION     • NODE DISSECTION LAPAROSCOPIC N/A 2019    Procedure: PELVIC LYMPH NODE DISSECTION LAPAROSCOPIC WITH DAVINCI ROBOT BILATERAL;  Surgeon: Lolis Aquino MD;  Location: [facility] OR;  Service: DaVinci   • TOTAL LAPAROSCOPIC HYSTERECTOMY N/A 2019    Procedure: RADICAL TOTAL LAPAROSCOPIC HYSTERECTOMY BILATERAL SALPINGECTOMY WITH DAVINCI ROBOT, OMENTUMECTOMY;  Surgeon: Lolis Aquino MD;  Location: [facility] OR;  Service: DaVinci       MEDICATIONS: The current medication list was reviewed and reconciled.     Allergies:  is allergic to codeine.    Family History   Problem Relation Age of Onset   • Pancreatic cancer Brother        Last imaging study was repeat Chest CT today.  CT Chest With Contrast  Narrative: EXAMINATION: CT CHEST W CONTRAST-      INDICATION: Lung nodule, <1cm, followup pulmonary nodule.     TECHNIQUE: Multiple axial CT imaging was obtained of the chest following  the administration of intravenous contrast.     The radiation dose reduction device was turned on for each scan per the  ALARA (As Low as Reasonably Achievable) protocol.     COMPARISON: 2019.     FINDINGS: The thyroid is homogeneous. No mediastinal mass or adenopathy.  Calcification is seen in the mediastinum. Vascular calcification is seen  within the thoracic aorta. Some calcification is also seen within the  left hilar region all suggesting old healed granulomatous disease. No  pericardial effusion. No bulky hilar or axillary lymphadenopathy. Lung  parenchyma reveals  nodularity identified superiorly and anteriorly  within the left upper lobe which is stable. There is some nodularity  posteriorly within the right upper lobe which is also stable. There is a  small noncalcified nodule seen posteriorly within the right upper lobe.  This area measures 3 mm in size and is stable and unchanged. Small  nodule identified at the right lung base which is stable and unchanged  in size and appearance when compared to the prior study. Several nodular  densities are identified within the left lung base which have resolved.     Impression: Stable old healed granulomatous disease seen within the  chest. Nodules are all less than 5 mm in size which remains stable.  Nodularity of the left lung base has resolved. Continued followup  recommended as indicated. No evidence of metastatic disease.     D:  06/10/2020  E:  06/10/2020             Tumor marker:     Date Value Ref Range Status   12/05/2019 25.1 0.0 - 38.1 U/mL Final   11/14/2019 22.2 0.0 - 38.1 U/mL Final   10/24/2019 22.7 0.0 - 38.1 U/mL Final   10/03/2019 29.5 0.0 - 38.1 U/mL Final       Review of Systems   Constitutional: Negative for appetite change, chills, fatigue, fever and unexpected weight change.   Respiratory: Negative for cough, shortness of breath and wheezing.    Cardiovascular: Negative for chest pain, palpitations and leg swelling.   Gastrointestinal: Negative for abdominal distention, abdominal pain, blood in stool, constipation, diarrhea, nausea and vomiting.   Endocrine: Negative.    Genitourinary: Negative for dyspareunia, dysuria, frequency, genital sores, hematuria, pelvic pain, urgency, vaginal bleeding, vaginal discharge and vaginal pain.   Musculoskeletal: Negative for arthralgias, gait problem and joint swelling.   Neurological: Negative for dizziness, seizures, syncope, weakness, light-headedness, numbness and headaches.   Hematological: Negative for adenopathy.   Psychiatric/Behavioral: The patient is  nervous/anxious.          Physical Exam  Vital Signs: /72   Pulse 70   Temp 98.8 °F (37.1 °C) (Temporal)   Resp 16   Wt 105 kg (232 lb)   SpO2 93%   BMI 34.24 kg/m²   Vitals:    06/10/20 1336   PainSc: 0-No pain           General Appearance:  alert, cooperative, no apparent distress and appears stated age   Neurologic/Psychiatric: A&O x 3, gait steady, appropriate affect   HEENT:  Normocephalic, without obvious abnormality, mucous membranes moist   Neck: Supple, symmetrical, trachea midline, no adenopathy;  No thyromegaly, masses, or tenderness   Back:   Symmetric, no curvature, ROM normal, no CVA tenderness   Lungs:   Clear to auscultation bilaterally; respirations regular, even, and unlabored bilaterally   Heart:  Regular rate and rhythm, no murmurs appreciated   Breasts:  deferred   Abdomen:   Soft, non-tender, non-distended, no organomegaly and Exam limited d/t habitus.   Lymph nodes: No cervical, supraclavicular, inguinal adenopathy noted   Extremities: Normal, atraumatic; no clubbing, cyanosis, or edema    Pelvic: External Genitalia  without lesions or skin changes  Vagina  is pale, atrophic.   Vaginal Cuff  Female Vaginal Cuff: smooth, intact and without visible lesions  Uterus  surgically absent and no palpable masses  Ovaries  surgically absent bilaterally  Parametria  smooth  Rectovaginal  Female rectovaginal: confirms no masses or bleeding and Hemoccult negative     ECOG Performance Status: (0) Fully Active - Able to Carry On All Pre-disease Performance Without Restriction    Procedure Note:  No notes on file      Assessment and Plan:  Hailey was seen today for follow-up.    Diagnoses and all orders for this visit:    Papillary serous adenocarcinoma of endometrium (CMS/HCC)  -     ; Future            There is no evidence of disease upon today's exam. She will be notified of CA-125 results upon their return. Continue every 3 month cancer surveillance visits for the first 2 years. She is  understanding to call with any changes in pelvic symptoms or general GYN concerns at any time between regularly scheduled visits.     Time was taken today to review images and preliminary results from chest CT at bedside with patient. Stable granulomatous disease, nodules all less than 5 mm, stable. Nodularity at left lung base has resolved. No evidence of disease. Patient is very pleased with this. We will repeat CTs PRN.     Pain assessment was performed today as a part of patient’s care.  For patients with pain related to surgery, gynecologic malignancy or cancer treatment, the plan is as noted in the assessment/plan.  For patients with pain not related to these issues, they are to seek any further needed care from a more appropriate provider, such as PCP.      Return to clinic in 3 months for ongoing cancer surveillance.      Pennie Mata, APRN

## 2020-06-11 ENCOUNTER — TELEPHONE (OUTPATIENT)
Dept: GYNECOLOGIC ONCOLOGY | Facility: CLINIC | Age: 70
End: 2020-06-11

## 2020-06-11 NOTE — TELEPHONE ENCOUNTER
----- Message from EVER Back sent at 6/11/2020  9:11 AM EDT -----  Please notify CA-125 low! Thanks!

## 2020-06-15 LAB — CREAT BLDA-MCNC: 1 MG/DL (ref 0.6–1.3)

## 2020-07-17 RX ORDER — PANTOPRAZOLE SODIUM 40 MG/1
TABLET, DELAYED RELEASE ORAL
Qty: 30 TABLET | Refills: 5 | Status: SHIPPED | OUTPATIENT
Start: 2020-07-17 | End: 2021-01-04

## 2020-09-09 ENCOUNTER — OFFICE VISIT (OUTPATIENT)
Dept: GYNECOLOGIC ONCOLOGY | Facility: CLINIC | Age: 70
End: 2020-09-09

## 2020-09-09 ENCOUNTER — LAB (OUTPATIENT)
Dept: LAB | Facility: HOSPITAL | Age: 70
End: 2020-09-09

## 2020-09-09 VITALS
SYSTOLIC BLOOD PRESSURE: 159 MMHG | BODY MASS INDEX: 35.7 KG/M2 | DIASTOLIC BLOOD PRESSURE: 72 MMHG | OXYGEN SATURATION: 94 % | TEMPERATURE: 97.7 F | RESPIRATION RATE: 17 BRPM | WEIGHT: 241 LBS | HEIGHT: 69 IN | HEART RATE: 61 BPM

## 2020-09-09 DIAGNOSIS — C54.1 PAPILLARY SEROUS ADENOCARCINOMA OF ENDOMETRIUM (HCC): Primary | ICD-10-CM

## 2020-09-09 PROCEDURE — 36415 COLL VENOUS BLD VENIPUNCTURE: CPT | Performed by: NURSE PRACTITIONER

## 2020-09-09 PROCEDURE — 99214 OFFICE O/P EST MOD 30 MIN: CPT | Performed by: NURSE PRACTITIONER

## 2020-09-09 PROCEDURE — 86304 IMMUNOASSAY TUMOR CA 125: CPT | Performed by: NURSE PRACTITIONER

## 2020-09-09 NOTE — PROGRESS NOTES
GYN ONCOLOGY CANCER SURVEILLANCE FOLLOW-UP    Hailey Osborn  [id number]  1950    Chief Complaint: Follow-up (trouble sleeping, no gyn complaints)        History of present illness:  Hailey Osborn is a 70 y.o. 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 She will reach 1 year from completion of treatment in December. Upon arrival she c/o trouble sleeping. She feels this is due to worry and stress related to her own gilma changes and family situations. She has not tried any medications or supplements. She is interested in OTC recommendations. Otherwise, she is feeling generally well and has no gyn complaints. She denies vaginal bleeding, pelvic pain, concerning lesions, or changes in bowel or bladder function.        Cancer History:      Papillary serous adenocarcinoma of endometrium (CMS/HCC)    7/22/2019 Biopsy     Patient presented to gyn, Dr. Patel, with c/o vaginal bleeding x 1 year. EMB revealed high grade papillary serous endometrial cancer.       7/28/2019 -  Other Event     While outpatient Gyn Oncology referral was pending, patient developed heavier vaginal bleeding and symptomatic anemia requiring hospital admission. Inpatient Gyn Oncology consult. Anemia managed with blood transfusions.   CA-125 = 9.8      7/29/2019 Imaging     Pre-op CT negative for chest disease or obvious metastatic disease. Enlarged uterus with underlying mass noted with several small questionable nodes.       8/2/2019 Surgery     RTLH/BSO, omentectomy, and bilateral pelvic lymph node dissection. Final pathology revealed papillary serous adenocarcinoma with invasion into 1.7 cm of 2.5 cm myometrium. No cervical involvement. Tubes, ovaries, omentum, and all nodes negative. No LVSI. MSI normal. Stage IB grade 3    Reflex testing reveals HER2+        8/23/2019 - 12/5/2019 Chemotherapy     OP UTERINE PACLitaxel / CARBOplatin (Q21D)  Dose reduction of  carboplatin and paclitaxel due to neuropathy.  Treated with gabapentin.      2019 - 2019 Radiation     Radiation OncologyTreatment Course:  Hailey Osborn received 3000 cGy in 5 fractions to upper vagina via High Dose Radiation - HDR.      2019 Imaging     CT scan chest/abdomen/pelvis: Likely postinflammatory changes of left lung noted.  Repeat CT scan of chest ordered.      3/10/2020 Survivorship     Survivorship Care Plan completed and discussed with patient.  Copy of Survivorship Care Plan provided to patient and primary care provider.      6/10/2020 Imaging     Repeat CT chest showed table old healed granulomatous disease. Nodules are all less than 5 mm in size which remains stable. Nodularity of the left lung base has resolved. No evidence of metastatic disease.         Past Medical History:   Diagnosis Date   • Endometrial cancer (CMS/HCC)     Stage IB grade 3 pap serous   • Hyperlipidemia    • Hypertension    • Murmur    • Pre-diabetes        Past Surgical History:   Procedure Laterality Date   • APPENDECTOMY     •  SECTION     • NODE DISSECTION LAPAROSCOPIC N/A 2019    Procedure: PELVIC LYMPH NODE DISSECTION LAPAROSCOPIC WITH DAVINCI ROBOT BILATERAL;  Surgeon: Lolis Aquino MD;  Location: [facility] OR;  Service: DaVinci   • TOTAL LAPAROSCOPIC HYSTERECTOMY N/A 2019    Procedure: RADICAL TOTAL LAPAROSCOPIC HYSTERECTOMY BILATERAL SALPINGECTOMY WITH DAVINCI ROBOT, OMENTUMECTOMY;  Surgeon: Lolis Aquino MD;  Location:  [facility] OR;  Service: DaVinci       MEDICATIONS: The current medication list was reviewed and reconciled.     Allergies:  is allergic to codeine.    Family History   Problem Relation Age of Onset   • Pancreatic cancer Brother        Last imaging study was repeat CT chest 2020.   Tumor marker:     Date Value Ref Range Status   06/10/2020 9.2 0.0 - 38.1 U/mL Final   2019 25.1 0.0 - 38.1 U/mL Final   2019 22.2 0.0 - 38.1 U/mL Final   10/24/2019  "22.7 0.0 - 38.1 U/mL Final       Review of Systems   Constitutional: Negative for appetite change, chills, fatigue, fever and unexpected weight change.   Respiratory: Negative for cough, shortness of breath and wheezing.    Cardiovascular: Negative for chest pain, palpitations and leg swelling.   Gastrointestinal: Negative for abdominal distention, abdominal pain, blood in stool, constipation, diarrhea, nausea and vomiting.   Endocrine: Negative.    Genitourinary: Negative for dyspareunia, dysuria, frequency, genital sores, hematuria, pelvic pain, urgency, vaginal bleeding, vaginal discharge and vaginal pain.   Musculoskeletal: Positive for arthralgias (bilateral knees). Negative for gait problem and joint swelling.   Neurological: Negative for dizziness, seizures, syncope, weakness, light-headedness, numbness and headaches.   Hematological: Negative for adenopathy.   Psychiatric/Behavioral: Positive for sleep disturbance (see HPI).         Physical Exam  Vital Signs: /72   Pulse 61   Temp 97.7 °F (36.5 °C) (Temporal)   Resp 17   Ht 175.3 cm (69.02\")   Wt 109 kg (241 lb)   SpO2 94%   BMI 35.57 kg/m²   Vitals:    09/09/20 1354   PainSc:   2   PainLoc: Knee           General Appearance:  alert, cooperative, no apparent distress, appears stated age and obese   Neurologic/Psychiatric: A&O x 3, gait steady, appropriate affect   HEENT:  Normocephalic, without obvious abnormality, mucous membranes moist   Neck: Supple, symmetrical, trachea midline, no adenopathy;  No thyromegaly, masses, or tenderness   Back:   Symmetric, no curvature, ROM normal, no CVA tenderness   Lungs:   Clear to auscultation bilaterally; respirations regular, even, and unlabored bilaterally   Heart:  Regular rate and rhythm, no murmurs appreciated   Breasts:  deferred   Abdomen:   Soft, non-tender, non-distended, no organomegaly and Exam limited d/t habitus.   Lymph nodes: No cervical, supraclavicular, inguinal adenopathy noted "   Extremities: Normal, atraumatic; no clubbing, cyanosis, or edema    Pelvic: External Genitalia  atrophic, without lesions  Vagina  is pale, atrophic.   Vaginal Cuff  Female Vaginal Cuff: smooth, intact and without visible lesions  Uterus  surgically absent and no palpable masses  Ovaries  surgically absent bilaterally  Parametria  smooth  Rectovaginal  Female rectovaginal: deferred     ECOG Performance Status: (0) Fully Active - Able to Carry On All Pre-disease Performance Without Restriction    Procedure Note:  No notes on file      Assessment and Plan:  Hailey was seen today for follow-up.    Diagnoses and all orders for this visit:    Papillary serous adenocarcinoma of endometrium (CMS/HCC)  -               There is no evidence of disease upon today's exam. She will be notified of CA-125 results upon their return. Continue every 3 month visits. She is understanding to call with any changes in pelvic symptoms or general GYN concerns at any time between regularly scheduled visits.     Patient and I discussed her trouble sleeping. She is interested in first trying OTC options. I recommended melatonin and/or Unisom sleep tabs PRN. If this is ineffective, we could consider other prescription options. She v/u.     Pain assessment was performed today as a part of patient’s care.  For patients with pain related to surgery, gynecologic malignancy or cancer treatment, the plan is as noted in the assessment/plan.  For patients with pain not related to these issues, they are to seek any further needed care from a more appropriate provider, such as PCP.      Return to clinic in 3 months for ongoing cancer surveillance.      Electronically signed by EVER Back on 09/09/20 at 18:15

## 2020-09-10 ENCOUNTER — TELEPHONE (OUTPATIENT)
Dept: GYNECOLOGIC ONCOLOGY | Facility: CLINIC | Age: 70
End: 2020-09-10

## 2020-09-10 LAB — CANCER AG125 SERPL QL: 8.5 U/ML (ref 0–38.1)

## 2020-09-10 NOTE — TELEPHONE ENCOUNTER
----- Message from EVER Back sent at 9/10/2020  8:56 AM EDT -----  Please notify CA-125 low! Thanks!

## 2020-12-10 ENCOUNTER — OFFICE VISIT (OUTPATIENT)
Dept: GYNECOLOGIC ONCOLOGY | Facility: CLINIC | Age: 70
End: 2020-12-10

## 2020-12-10 ENCOUNTER — LAB (OUTPATIENT)
Dept: LAB | Facility: HOSPITAL | Age: 70
End: 2020-12-10

## 2020-12-10 VITALS
WEIGHT: 244 LBS | HEART RATE: 54 BPM | HEIGHT: 69 IN | DIASTOLIC BLOOD PRESSURE: 67 MMHG | BODY MASS INDEX: 36.14 KG/M2 | RESPIRATION RATE: 18 BRPM | SYSTOLIC BLOOD PRESSURE: 147 MMHG | TEMPERATURE: 97.8 F

## 2020-12-10 DIAGNOSIS — C54.1 PAPILLARY SEROUS ADENOCARCINOMA OF ENDOMETRIUM (HCC): Primary | ICD-10-CM

## 2020-12-10 DIAGNOSIS — C54.1 PAPILLARY SEROUS ADENOCARCINOMA OF ENDOMETRIUM (HCC): ICD-10-CM

## 2020-12-10 LAB — CANCER AG125 SERPL QL: 8.1 U/ML (ref 0–38.1)

## 2020-12-10 PROCEDURE — 36415 COLL VENOUS BLD VENIPUNCTURE: CPT

## 2020-12-10 PROCEDURE — 99213 OFFICE O/P EST LOW 20 MIN: CPT | Performed by: NURSE PRACTITIONER

## 2020-12-10 PROCEDURE — 86304 IMMUNOASSAY TUMOR CA 125: CPT

## 2020-12-10 NOTE — PROGRESS NOTES
GYN ONCOLOGY CANCER SURVEILLANCE FOLLOW-UP    Hailey Osborn  [id number]  1950    Chief Complaint: Follow-up (no complaints)        History of present illness:  Hailey Osborn is a 70 y.o. 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 She is now 1 year out from completion of treatment in December. She is feeling generally well and has no gyn complaints. She denies vaginal bleeding, pelvic pain, concerning lesions, or changes in bowel or bladder function. She is having some knee pain intermittently, possible arthritis, using topical diclofenac PRN.        Cancer History:   Oncology/Hematology History   Papillary serous adenocarcinoma of endometrium (CMS/HCC)   7/22/2019 Biopsy    Patient presented to gyn, Dr. Patel, with c/o vaginal bleeding x 1 year. EMB revealed high grade papillary serous endometrial cancer.      7/28/2019 -  Other Event    While outpatient Gyn Oncology referral was pending, patient developed heavier vaginal bleeding and symptomatic anemia requiring hospital admission. Inpatient Gyn Oncology consult. Anemia managed with blood transfusions.   CA-125 = 9.8     7/29/2019 Imaging    Pre-op CT negative for chest disease or obvious metastatic disease. Enlarged uterus with underlying mass noted with several small questionable nodes.      8/2/2019 Surgery    RTLH/BSO, omentectomy, and bilateral pelvic lymph node dissection. Final pathology revealed papillary serous adenocarcinoma with invasion into 1.7 cm of 2.5 cm myometrium. No cervical involvement. Tubes, ovaries, omentum, and all nodes negative. No LVSI. MSI normal. 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  Treated with gabapentin.     9/18/2019 - 9/30/2019 Radiation    Radiation OncologyTreatment Course:   Hailey Osborn received 3000 cGy in 5 fractions to upper vagina via High Dose Radiation - HDR.     2019 Imaging    CT scan chest/abdomen/pelvis: Likely postinflammatory changes of left lung noted.  Repeat CT scan of chest ordered.     3/10/2020 Survivorship    Survivorship Care Plan completed and discussed with patient.  Copy of Survivorship Care Plan provided to patient and primary care provider.     6/10/2020 Imaging    Repeat CT chest showed table old healed granulomatous disease. Nodules are all less than 5 mm in size which remains stable. Nodularity of the left lung base has resolved. No evidence of metastatic disease.         Past Medical History:   Diagnosis Date   • Endometrial cancer (CMS/[facility])     Stage IB grade 3 pap serous   • Hyperlipidemia    • Hypertension    • Murmur    • Pre-diabetes        Past Surgical History:   Procedure Laterality Date   • APPENDECTOMY     •  SECTION     • NODE DISSECTION LAPAROSCOPIC N/A 2019    Procedure: PELVIC LYMPH NODE DISSECTION LAPAROSCOPIC WITH DAVINCI ROBOT BILATERAL;  Surgeon: Lolis Aquino MD;  Location:  [facility] OR;  Service: DaVinci   • TOTAL LAPAROSCOPIC HYSTERECTOMY N/A 2019    Procedure: RADICAL TOTAL LAPAROSCOPIC HYSTERECTOMY BILATERAL SALPINGECTOMY WITH DAVINCI ROBOT, OMENTUMECTOMY;  Surgeon: Lolis Aquino MD;  Location:  [facility] OR;  Service: DaVinci       MEDICATIONS: The current medication list was reviewed and reconciled.     Allergies:  is allergic to codeine.    Family History   Problem Relation Age of Onset   • Pancreatic cancer Brother        Last imaging study was repeat CT chest 2020.   Tumor marker:     Date Value Ref Range Status   2020 8.5 0.0 - 38.1 U/mL Final   06/10/2020 9.2 0.0 - 38.1 U/mL Final   2019 25.1 0.0 - 38.1 U/mL Final   2019 22.2 0.0 - 38.1 U/mL Final       Review of Systems   Constitutional: Negative for appetite change, chills, fatigue, fever and unexpected weight change.  "  Respiratory: Negative for cough, shortness of breath and wheezing.    Cardiovascular: Negative for chest pain, palpitations and leg swelling.   Gastrointestinal: Negative for abdominal distention, abdominal pain, blood in stool, constipation, diarrhea, nausea and vomiting.   Endocrine: Negative.    Genitourinary: Negative for dyspareunia, dysuria, frequency, genital sores, hematuria, pelvic pain, urgency, vaginal bleeding, vaginal discharge and vaginal pain.   Musculoskeletal: Positive for arthralgias (bilateral knees). Negative for gait problem and joint swelling.   Neurological: Negative for dizziness, seizures, syncope, weakness, light-headedness, numbness and headaches.   Hematological: Negative for adenopathy.         Physical Exam  Vital Signs: /67   Pulse 54   Temp 97.8 °F (36.6 °C) (Temporal)   Resp 18   Ht 175.3 cm (69.02\")   Wt 111 kg (244 lb)   BMI 36.02 kg/m²   Vitals:    12/10/20 1315   PainSc: 0-No pain           General Appearance:  alert, cooperative, no apparent distress, appears stated age and obese   Neurologic/Psychiatric: A&O x 3, gait steady, appropriate affect   HEENT:  Normocephalic, without obvious abnormality, mucous membranes moist   Lungs:   Clear to auscultation bilaterally; respirations regular, even, and unlabored bilaterally   Heart:  Regular rate and rhythm, no murmurs appreciated   Breasts:  deferred   Abdomen:   Soft, non-tender, non-distended, no organomegaly and Exam limited d/t habitus.   Lymph nodes: No cervical, supraclavicular, inguinal adenopathy noted   Extremities: Normal, atraumatic; no clubbing, cyanosis, or edema    Pelvic: External Genitalia  atrophic, without lesions  Vagina  is pale, atrophic.   Vaginal Cuff  Female Vaginal Cuff: smooth, intact and without visible lesions  Uterus  surgically absent and no palpable masses  Ovaries  surgically absent bilaterally  Parametria  smooth  Rectovaginal  Female rectovaginal: deferred     ECOG Performance Status: " (0) Fully Active - Able to Carry On All Pre-disease Performance Without Restriction    Procedure Note:  No notes on file      Assessment and Plan:  Diagnoses and all orders for this visit:    1. Papillary serous adenocarcinoma of endometrium (CMS/HCC) (Primary)  -     ; Future          There is no evidence of disease upon today's exam. She will be notified of CA-125 results upon their return. Continue every 3 month visits until the 2 year angélica. She is understanding to call with any changes in pelvic symptoms or general GYN concerns at any time between regularly scheduled visits.     Pain assessment was performed today as a part of patient’s care.  For patients with pain related to surgery, gynecologic malignancy or cancer treatment, the plan is as noted in the assessment/plan.  For patients with pain not related to these issues, they are to seek any further needed care from a more appropriate provider, such as PCP.      Return to clinic in 3 months for ongoing cancer surveillance.      Electronically signed by EVER Back on 12/10/20 at 13:59 EST

## 2020-12-11 ENCOUNTER — TELEPHONE (OUTPATIENT)
Dept: GYNECOLOGIC ONCOLOGY | Facility: CLINIC | Age: 70
End: 2020-12-11

## 2020-12-11 NOTE — TELEPHONE ENCOUNTER
----- Message from EVER Back sent at 12/11/2020  9:05 AM EST -----  Please notify CA-125 low! Thanks!

## 2021-01-04 RX ORDER — PANTOPRAZOLE SODIUM 40 MG/1
TABLET, DELAYED RELEASE ORAL
Qty: 30 TABLET | Refills: 4 | Status: SHIPPED | OUTPATIENT
Start: 2021-01-04 | End: 2021-06-01

## 2021-02-09 ENCOUNTER — HOSPITAL ENCOUNTER (OUTPATIENT)
Dept: MAMMOGRAPHY | Facility: HOSPITAL | Age: 71
Discharge: HOME | End: 2021-02-09
Admitting: PHYSICIAN ASSISTANT

## 2021-02-09 DIAGNOSIS — Z12.31 VISIT FOR SCREENING MAMMOGRAM: ICD-10-CM

## 2021-02-09 PROCEDURE — 77063 BREAST TOMOSYNTHESIS BI: CPT | Performed by: RADIOLOGY

## 2021-02-09 PROCEDURE — 77067 SCR MAMMO BI INCL CAD: CPT

## 2021-02-09 PROCEDURE — 77063 BREAST TOMOSYNTHESIS BI: CPT

## 2021-02-09 PROCEDURE — 77067 SCR MAMMO BI INCL CAD: CPT | Performed by: RADIOLOGY

## 2021-02-12 DIAGNOSIS — Z23 IMMUNIZATION DUE: ICD-10-CM

## 2021-03-11 ENCOUNTER — LAB (OUTPATIENT)
Dept: LAB | Facility: HOSPITAL | Age: 71
End: 2021-03-11

## 2021-03-11 ENCOUNTER — OFFICE VISIT (OUTPATIENT)
Dept: GYNECOLOGIC ONCOLOGY | Facility: CLINIC | Age: 71
End: 2021-03-11

## 2021-03-11 VITALS
HEART RATE: 65 BPM | DIASTOLIC BLOOD PRESSURE: 72 MMHG | SYSTOLIC BLOOD PRESSURE: 160 MMHG | BODY MASS INDEX: 36.43 KG/M2 | RESPIRATION RATE: 15 BRPM | OXYGEN SATURATION: 95 % | HEIGHT: 69 IN | TEMPERATURE: 97.3 F | WEIGHT: 246 LBS

## 2021-03-11 DIAGNOSIS — C54.1 PAPILLARY SEROUS ADENOCARCINOMA OF ENDOMETRIUM (HCC): ICD-10-CM

## 2021-03-11 DIAGNOSIS — C54.1 PAPILLARY SEROUS ADENOCARCINOMA OF ENDOMETRIUM (HCC): Primary | ICD-10-CM

## 2021-03-11 LAB — CANCER AG125 SERPL QL: 9 U/ML (ref 0–38.1)

## 2021-03-11 PROCEDURE — 36415 COLL VENOUS BLD VENIPUNCTURE: CPT

## 2021-03-11 PROCEDURE — 99213 OFFICE O/P EST LOW 20 MIN: CPT | Performed by: NURSE PRACTITIONER

## 2021-03-11 PROCEDURE — 86304 IMMUNOASSAY TUMOR CA 125: CPT

## 2021-03-11 RX ORDER — HYDROCHLOROTHIAZIDE 25 MG/1
TABLET ORAL
COMMUNITY
Start: 2021-02-10 | End: 2021-06-16

## 2021-03-11 NOTE — PROGRESS NOTES
GYN ONCOLOGY CANCER SURVEILLANCE FOLLOW-UP    Hailey Osborn  [id number]  1950    Subjective   Chief Complaint: Follow-up (no complaints)        History of present illness:     Hailey Osborn is a 70 y.o. 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 She is over 1 year out from completion of treatment. She reports she is feeling very well today and has no complaints. She denies vaginal bleeding, pelvic pain, concerning lesions, abdominal fullness/bloating, and changes in bowel or bladder function. CA-125 has been low/stable, due to repeat today.        Cancer History:   Oncology/Hematology History   Papillary serous adenocarcinoma of endometrium (CMS/HCC)   7/22/2019 Biopsy    Patient presented to gyn, Dr. Patel, with c/o vaginal bleeding x 1 year. EMB revealed high grade papillary serous endometrial cancer.      7/28/2019 -  Other Event    While outpatient Gyn Oncology referral was pending, patient developed heavier vaginal bleeding and symptomatic anemia requiring hospital admission. Inpatient Gyn Oncology consult. Anemia managed with blood transfusions.   CA-125 = 9.8     7/29/2019 Imaging    Pre-op CT negative for chest disease or obvious metastatic disease. Enlarged uterus with underlying mass noted with several small questionable nodes.      8/2/2019 Surgery    RTLH/BSO, omentectomy, and bilateral pelvic lymph node dissection. Final pathology revealed papillary serous adenocarcinoma with invasion into 1.7 cm of 2.5 cm myometrium. No cervical involvement. Tubes, ovaries, omentum, and all nodes negative. No LVSI. MSI normal. 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  Treated with gabapentin.     9/18/2019 - 9/30/2019 Radiation    Radiation OncologyTreatment Course:  Hailey  "Irena received 3000 cGy in 5 fractions to upper vagina via High Dose Radiation - HDR.     12/26/2019 Imaging    CT scan chest/abdomen/pelvis: Likely postinflammatory changes of left lung noted.  Repeat CT scan of chest ordered.     3/10/2020 Survivorship    Survivorship Care Plan completed and discussed with patient.  Copy of Survivorship Care Plan provided to patient and primary care provider.     6/10/2020 Imaging    Repeat CT chest showed table old healed granulomatous disease. Nodules are all less than 5 mm in size which remains stable. Nodularity of the left lung base has resolved. No evidence of metastatic disease.           The current medication list and allergy list were reviewed and reconciled.     Past Medical History, Past Surgical History, Social History, Family History have been reviewed and are without significant changes except as mentioned.      Review of Systems   Constitutional: Negative.    Respiratory: Negative.    Cardiovascular: Negative.    Gastrointestinal: Negative.    Genitourinary: Negative.    Psychiatric/Behavioral: Negative.          Objective   Physical Exam  Vital Signs: /72   Pulse 65   Temp 97.3 °F (36.3 °C) (Temporal)   Resp 15   Ht 175.3 cm (69.02\")   Wt 112 kg (246 lb)   SpO2 95%   BMI 36.31 kg/m²   Vitals:    03/11/21 1300   PainSc: 0-No pain           General Appearance:  alert, cooperative, no apparent distress and appears stated age   Neurologic/Psychiatric: A&O x 3, gait steady, appropriate affect   HEENT:  Normocephalic, without obvious abnormality, mucous membranes moist   Abdomen:   Soft, non-tender, non-distended and no organomegaly   Lymph nodes: No cervical, supraclavicular, inguinal adenopathy noted   Pelvic: External Genitalia  atrophic, without lesions  Vagina  is pale, atrophic.   Vaginal Cuff  Female Vaginal Cuff: smooth, intact and without visible lesions  Uterus  surgically absent and no palpable masses  Ovaries  surgically absent " bilaterally  Parametria  smooth  Rectovaginal  Female rectovaginal: deferred     ECOG score: 0     Karnofsky score: 100       Result Review :   The following data was reviewed by: EVER Back on 03/11/2021:  Last imaging study was CT abdomen pelvis 12/26/2019, last CT chest 6/10/2020.   Tumor marker:     Date Value Ref Range Status   12/10/2020 8.1 0.0 - 38.1 U/mL Final   09/09/2020 8.5 0.0 - 38.1 U/mL Final   06/10/2020 9.2 0.0 - 38.1 U/mL Final   12/05/2019 25.1 0.0 - 38.1 U/mL Final       PHQ-9 Total Score: 0    Procedure Note:  No notes on file         Assessment and Plan:    Diagnoses and all orders for this visit:    1. Papillary serous adenocarcinoma of endometrium (CMS/HCC) (Primary)  -     ; Future            There is no evidence of disease upon today's exam. She will be notified of CA-125 results upon their return. Continue every 3 month cancer surveillance visits until the 2 year angélica. She is understanding to call with any changes in pelvic symptoms or general GYN concerns at any time between regularly scheduled visits.     Pain assessment was performed today as a part of patient’s care.  For patients with pain related to surgery, gynecologic malignancy or cancer treatment, the plan is as noted in the assessment/plan.  For patients with pain not related to these issues, they are to seek any further needed care from a more appropriate provider, such as PCP.      Follow-up:     Return to clinic in 3 months for ongoing cancer surveillance.      Electronically signed by EVER Back on 03/11/21 at 13:32 EST

## 2021-03-12 ENCOUNTER — TELEPHONE (OUTPATIENT)
Dept: GYNECOLOGIC ONCOLOGY | Facility: CLINIC | Age: 71
End: 2021-03-12

## 2021-03-12 NOTE — TELEPHONE ENCOUNTER
----- Message from EVER Back sent at 3/12/2021  8:32 AM EST -----  Please notify CA-125 low! Thanks!

## 2021-03-19 ENCOUNTER — IMMUNIZATION (OUTPATIENT)
Dept: VACCINE CLINIC | Facility: HOSPITAL | Age: 71
End: 2021-03-19

## 2021-03-19 PROCEDURE — 0001A: CPT | Performed by: INTERNAL MEDICINE

## 2021-03-19 PROCEDURE — 91300 HC SARSCOV02 VAC 30MCG/0.3ML IM: CPT | Performed by: INTERNAL MEDICINE

## 2021-03-27 ENCOUNTER — HOSPITAL ENCOUNTER (OUTPATIENT)
Dept: HOSPITAL 79 - ER1 | Age: 71
Setting detail: OBSERVATION
LOS: 2 days | Discharge: HOME | End: 2021-03-29
Attending: INTERNAL MEDICINE | Admitting: INTERNAL MEDICINE
Payer: MEDICARE

## 2021-03-27 VITALS — BODY MASS INDEX: 36.14 KG/M2 | HEIGHT: 69 IN | WEIGHT: 244 LBS

## 2021-03-27 DIAGNOSIS — Z79.899: ICD-10-CM

## 2021-03-27 DIAGNOSIS — I25.2: ICD-10-CM

## 2021-03-27 DIAGNOSIS — E87.1: ICD-10-CM

## 2021-03-27 DIAGNOSIS — I10: ICD-10-CM

## 2021-03-27 DIAGNOSIS — Z85.42: ICD-10-CM

## 2021-03-27 DIAGNOSIS — N30.00: ICD-10-CM

## 2021-03-27 DIAGNOSIS — E86.0: ICD-10-CM

## 2021-03-27 DIAGNOSIS — Z88.5: ICD-10-CM

## 2021-03-27 DIAGNOSIS — R55: Primary | ICD-10-CM

## 2021-03-27 DIAGNOSIS — E03.9: ICD-10-CM

## 2021-03-27 DIAGNOSIS — E11.9: ICD-10-CM

## 2021-03-27 DIAGNOSIS — Z92.21: ICD-10-CM

## 2021-03-27 DIAGNOSIS — Z90.710: ICD-10-CM

## 2021-03-27 DIAGNOSIS — Z20.822: ICD-10-CM

## 2021-03-27 DIAGNOSIS — Z79.84: ICD-10-CM

## 2021-03-27 DIAGNOSIS — Z95.5: ICD-10-CM

## 2021-03-27 DIAGNOSIS — I25.10: ICD-10-CM

## 2021-03-27 LAB
BUN/CREATININE RATIO: 20 (ref 0–10)
BUN/CREATININE RATIO: 23 (ref 0–10)
HGB BLD-MCNC: 12.8 GM/DL (ref 12.3–15.3)
RED BLOOD COUNT: 4.23 M/UL (ref 4–5.1)
WHITE BLOOD COUNT: 9.1 K/UL (ref 4.5–11)

## 2021-03-27 PROCEDURE — G0378 HOSPITAL OBSERVATION PER HR: HCPCS

## 2021-03-27 PROCEDURE — U0002 COVID-19 LAB TEST NON-CDC: HCPCS

## 2021-03-28 LAB
BUN/CREATININE RATIO: 16 (ref 0–10)
BUN/CREATININE RATIO: 19 (ref 0–10)
HGB BLD-MCNC: 11.9 GM/DL (ref 12.3–15.3)
RED BLOOD COUNT: 3.99 M/UL (ref 4–5.1)
WHITE BLOOD COUNT: 7.5 K/UL (ref 4.5–11)

## 2021-03-29 LAB — BUN/CREATININE RATIO: 11 (ref 0–10)

## 2021-04-09 ENCOUNTER — IMMUNIZATION (OUTPATIENT)
Dept: VACCINE CLINIC | Facility: HOSPITAL | Age: 71
End: 2021-04-09

## 2021-04-09 PROCEDURE — 91300 HC SARSCOV02 VAC 30MCG/0.3ML IM: CPT | Performed by: INTERNAL MEDICINE

## 2021-04-09 PROCEDURE — 0002A: CPT | Performed by: INTERNAL MEDICINE

## 2021-06-01 RX ORDER — PANTOPRAZOLE SODIUM 40 MG/1
TABLET, DELAYED RELEASE ORAL
Qty: 30 TABLET | Refills: 4 | Status: SHIPPED | OUTPATIENT
Start: 2021-06-01 | End: 2021-09-22 | Stop reason: SDUPTHER

## 2021-06-15 NOTE — PROGRESS NOTES
GYN ONCOLOGY CANCER SURVEILLANCE FOLLOW-UP    Hailey Osborn  [id number]  1950    Subjective   Chief Complaint: Follow-up (3 month, pap serous endo cancer surveillance )        History of present illness:     Hailey Osborn is a 70 y.o. year old female who is here today for ongoing surveillance of papillary serous endometrial cancer, see history below. She is about 1.5 years out from completion of treatment. She reports she is feeling very well today and has no complaints. She denies vaginal bleeding, pelvic pain, concerning lesions, abdominal fullness/bloating, and changes in bowel or bladder function. CA-125 has been low/stable.  She was admitted to the hospital on March for presyncopal event.  There was concern for a heart attack, however work-up was negative.  She was noted to have hyponatremia at that time which was corrected.       Cancer History:   Oncology/Hematology History   Papillary serous adenocarcinoma of endometrium (CMS/HCC)   7/22/2019 Biopsy    Patient presented to gyn, Dr. Patel, with c/o vaginal bleeding x 1 year. EMB revealed high grade papillary serous endometrial cancer.      7/28/2019 -  Other Event    While outpatient Gyn Oncology referral was pending, patient developed heavier vaginal bleeding and symptomatic anemia requiring hospital admission. Inpatient Gyn Oncology consult. Anemia managed with blood transfusions.   CA-125 = 9.8     7/29/2019 Imaging    Pre-op CT negative for chest disease or obvious metastatic disease. Enlarged uterus with underlying mass noted with several small questionable nodes.      8/2/2019 Surgery    RTLH/BSO, omentectomy, and bilateral pelvic lymph node dissection. Final pathology revealed papillary serous adenocarcinoma with invasion into 1.7 cm of 2.5 cm myometrium. No cervical involvement. Tubes, ovaries, omentum, and all nodes negative. No LVSI. MSI normal. 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  Treated with gabapentin.     9/18/2019 - 9/30/2019 Radiation    Radiation OncologyTreatment Course:  Hailey Osborn received 3000 cGy in 5 fractions to upper vagina via High Dose Radiation - HDR.     12/26/2019 Imaging    CT scan chest/abdomen/pelvis: Likely postinflammatory changes of left lung noted.  Repeat CT scan of chest ordered.     3/10/2020 Survivorship    Survivorship Care Plan completed and discussed with patient.  Copy of Survivorship Care Plan provided to patient and primary care provider.     6/10/2020 Imaging    Repeat CT chest showed table old healed granulomatous disease. Nodules are all less than 5 mm in size which remains stable. Nodularity of the left lung base has resolved. No evidence of metastatic disease.           The current medication list and allergy list were reviewed and reconciled.     Past Medical History, Past Surgical History, Social History, Family History have been reviewed and are without significant changes except as mentioned.      Review of Systems   Constitutional: Negative.    Respiratory: Negative.    Cardiovascular: Negative.    Gastrointestinal: Negative.    Genitourinary: Negative.    Psychiatric/Behavioral: Negative.          Objective   Physical Exam  Vital Signs: /78   Pulse 62   Temp 98 °F (36.7 °C) (Temporal)   Resp 18   Ht 175.3 cm (69.02\")   Wt 111 kg (244 lb 4.8 oz)   SpO2 96%   BMI 36.06 kg/m²   Vitals:    06/16/21 1330   PainSc: 0-No pain           General Appearance:  alert, cooperative, no apparent distress and appears stated age   Neurologic/Psychiatric: A&O x 3, gait steady, appropriate affect   HEENT:  Normocephalic, without obvious abnormality, mucous membranes moist   Abdomen:   Soft, non-tender, non-distended and no organomegaly   Lymph nodes: No cervical, supraclavicular, inguinal adenopathy noted   Pelvic: External " Genitalia  atrophic, without lesions  Vagina  is pale, atrophic.  There are telangiectasias consistent with prior radiation therapy.  Vaginal Cuff  Female Vaginal Cuff: smooth, intact.  Uterus  surgically absent and no palpable masses  Ovaries  surgically absent bilaterally  Parametria  smooth  Rectovaginal  Female rectovaginal: deferred     ECOG score: 0     Karnofsky score: 100       Result Review :   Tumor marker:     Date Value Ref Range Status   03/11/2021 9.0 0.0 - 38.1 U/mL Final   12/10/2020 8.1 0.0 - 38.1 U/mL Final   09/09/2020 8.5 0.0 - 38.1 U/mL Final   06/10/2020 9.2 0.0 - 38.1 U/mL Final       PHQ-9 Total Score:             Assessment and Plan:    Diagnoses and all orders for this visit:    1. Papillary serous adenocarcinoma of endometrium (CMS/HCC) (Primary)  -     ; Future    There is no evidence of disease upon today's exam. Continue every 3 month cancer surveillance visits until the 2 year angélica. She is understanding to call with any changes in pelvic symptoms or general GYN concerns at any time between regularly scheduled visits.      Pain assessment was performed today as a part of patient’s care.  For patients with pain related to surgery, gynecologic malignancy or cancer treatment, the plan is as noted in the assessment/plan.  For patients with pain not related to these issues, they are to seek any further needed care from a more appropriate provider, such as PCP.    Follow-up:     Return to clinic in 3 months for ongoing cancer surveillance.    Patient was seen and examined with Dr. Hester,  resident, who performed portions of the examination and documentation for this patient's care under my direct supervision.  I agree with the above documentation and plan.    Lolis Aquino MD  06/16/21  21:39 EDT             sudden onset

## 2021-06-16 ENCOUNTER — LAB (OUTPATIENT)
Dept: LAB | Facility: HOSPITAL | Age: 71
End: 2021-06-16

## 2021-06-16 ENCOUNTER — OFFICE VISIT (OUTPATIENT)
Dept: GYNECOLOGIC ONCOLOGY | Facility: CLINIC | Age: 71
End: 2021-06-16

## 2021-06-16 VITALS
DIASTOLIC BLOOD PRESSURE: 78 MMHG | BODY MASS INDEX: 36.18 KG/M2 | OXYGEN SATURATION: 96 % | HEIGHT: 69 IN | RESPIRATION RATE: 18 BRPM | HEART RATE: 62 BPM | TEMPERATURE: 98 F | SYSTOLIC BLOOD PRESSURE: 178 MMHG | WEIGHT: 244.3 LBS

## 2021-06-16 DIAGNOSIS — C54.1 PAPILLARY SEROUS ADENOCARCINOMA OF ENDOMETRIUM (HCC): Primary | ICD-10-CM

## 2021-06-16 LAB — CANCER AG125 SERPL QL: 10.1 U/ML (ref 0–38.1)

## 2021-06-16 PROCEDURE — 86304 IMMUNOASSAY TUMOR CA 125: CPT | Performed by: OBSTETRICS & GYNECOLOGY

## 2021-06-16 PROCEDURE — 36415 COLL VENOUS BLD VENIPUNCTURE: CPT | Performed by: OBSTETRICS & GYNECOLOGY

## 2021-06-16 PROCEDURE — 99213 OFFICE O/P EST LOW 20 MIN: CPT | Performed by: OBSTETRICS & GYNECOLOGY

## 2021-09-22 ENCOUNTER — OFFICE VISIT (OUTPATIENT)
Dept: GYNECOLOGIC ONCOLOGY | Facility: CLINIC | Age: 71
End: 2021-09-22

## 2021-09-22 ENCOUNTER — LAB (OUTPATIENT)
Dept: LAB | Facility: HOSPITAL | Age: 71
End: 2021-09-22

## 2021-09-22 VITALS
RESPIRATION RATE: 15 BRPM | OXYGEN SATURATION: 95 % | WEIGHT: 243.2 LBS | BODY MASS INDEX: 36.02 KG/M2 | TEMPERATURE: 97.3 F | HEIGHT: 69 IN | HEART RATE: 56 BPM | DIASTOLIC BLOOD PRESSURE: 72 MMHG | SYSTOLIC BLOOD PRESSURE: 140 MMHG

## 2021-09-22 DIAGNOSIS — C54.1 PAPILLARY SEROUS ADENOCARCINOMA OF ENDOMETRIUM (HCC): ICD-10-CM

## 2021-09-22 DIAGNOSIS — C54.1 PAPILLARY SEROUS ADENOCARCINOMA OF ENDOMETRIUM (HCC): Primary | ICD-10-CM

## 2021-09-22 LAB — CANCER AG125 SERPL QL: 10.3 U/ML (ref 0–38.1)

## 2021-09-22 PROCEDURE — 36415 COLL VENOUS BLD VENIPUNCTURE: CPT

## 2021-09-22 PROCEDURE — 99212 OFFICE O/P EST SF 10 MIN: CPT | Performed by: NURSE PRACTITIONER

## 2021-09-22 PROCEDURE — 86304 IMMUNOASSAY TUMOR CA 125: CPT

## 2021-09-22 RX ORDER — HYDROCHLOROTHIAZIDE 12.5 MG/1
TABLET ORAL
COMMUNITY
Start: 2021-09-09 | End: 2022-06-29

## 2021-09-22 RX ORDER — PANTOPRAZOLE SODIUM 40 MG/1
40 TABLET, DELAYED RELEASE ORAL
Qty: 30 TABLET | Refills: 5 | Status: SHIPPED | OUTPATIENT
Start: 2021-09-22 | End: 2022-06-29

## 2021-09-23 ENCOUNTER — TELEPHONE (OUTPATIENT)
Dept: GYNECOLOGIC ONCOLOGY | Facility: CLINIC | Age: 71
End: 2021-09-23

## 2021-09-23 NOTE — TELEPHONE ENCOUNTER
----- Message from EVER Back sent at 9/23/2021  8:36 AM EDT -----  Please notify CA-125 low/stable. Thanks!

## 2021-12-22 ENCOUNTER — OFFICE VISIT (OUTPATIENT)
Dept: GYNECOLOGIC ONCOLOGY | Facility: CLINIC | Age: 71
End: 2021-12-22

## 2021-12-22 ENCOUNTER — LAB (OUTPATIENT)
Dept: LAB | Facility: HOSPITAL | Age: 71
End: 2021-12-22

## 2021-12-22 VITALS
RESPIRATION RATE: 18 BRPM | BODY MASS INDEX: 36.73 KG/M2 | OXYGEN SATURATION: 99 % | TEMPERATURE: 96.6 F | WEIGHT: 248 LBS | HEART RATE: 60 BPM | SYSTOLIC BLOOD PRESSURE: 151 MMHG | HEIGHT: 69 IN | DIASTOLIC BLOOD PRESSURE: 73 MMHG

## 2021-12-22 DIAGNOSIS — C54.1 PAPILLARY SEROUS ADENOCARCINOMA OF ENDOMETRIUM (HCC): Primary | ICD-10-CM

## 2021-12-22 LAB — CANCER AG125 SERPL QL: 9.2 U/ML (ref 0–38.1)

## 2021-12-22 PROCEDURE — 99213 OFFICE O/P EST LOW 20 MIN: CPT | Performed by: NURSE PRACTITIONER

## 2021-12-22 PROCEDURE — 86304 IMMUNOASSAY TUMOR CA 125: CPT | Performed by: NURSE PRACTITIONER

## 2021-12-22 PROCEDURE — 36415 COLL VENOUS BLD VENIPUNCTURE: CPT | Performed by: NURSE PRACTITIONER

## 2021-12-22 NOTE — PROGRESS NOTES
GYN ONCOLOGY CANCER SURVEILLANCE FOLLOW-UP    Hailey Osborn  [id number]  1950    Subjective   Chief Complaint: Uterine Cancer (no complaints)        History of present illness:     Hailey Osborn is a 71 y.o. 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 She is now 2 years from completion of treatment. She reports she is feeling very well today and has no complaints. She denies vaginal bleeding, pelvic pain, concerning lesions, abdominal fullness/bloating, and changes in bowel or bladder function. CA-125 has been low/stable, due to repeat today. She has completed her initial COVID-19 vaccines, declines booster and seasonal flu shot.        Cancer History:   Oncology/Hematology History   Papillary serous adenocarcinoma of endometrium (HCC)   7/22/2019 Biopsy    Patient presented to gyn, Dr. Patel, with c/o vaginal bleeding x 1 year. EMB revealed high grade papillary serous endometrial cancer.      7/28/2019 -  Other Event    While outpatient Gyn Oncology referral was pending, patient developed heavier vaginal bleeding and symptomatic anemia requiring hospital admission. Inpatient Gyn Oncology consult. Anemia managed with blood transfusions.   CA-125 = 9.8     7/29/2019 Imaging    Pre-op CT negative for chest disease or obvious metastatic disease. Enlarged uterus with underlying mass noted with several small questionable nodes.      8/2/2019 Surgery    RTLH/BSO, omentectomy, and bilateral pelvic lymph node dissection. Final pathology revealed papillary serous adenocarcinoma with invasion into 1.7 cm of 2.5 cm myometrium. No cervical involvement. Tubes, ovaries, omentum, and all nodes negative. No LVSI. MSI normal. 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  Treated with  "gabapentin.     9/18/2019 - 9/30/2019 Radiation    Radiation OncologyTreatment Course:  Hailey Osborn received 3000 cGy in 5 fractions to upper vagina via High Dose Radiation - HDR.     12/26/2019 Imaging    CT scan chest/abdomen/pelvis: Likely postinflammatory changes of left lung noted.  Repeat CT scan of chest ordered.     3/10/2020 Survivorship    Survivorship Care Plan completed and discussed with patient.  Copy of Survivorship Care Plan provided to patient and primary care provider.     6/10/2020 Imaging    Repeat CT chest showed table old healed granulomatous disease. Nodules are all less than 5 mm in size which remains stable. Nodularity of the left lung base has resolved. No evidence of metastatic disease.           The current medication list and allergy list were reviewed and reconciled.     Past Medical History, Past Surgical History, Social History, Family History have been reviewed and are without significant changes except as mentioned.      Review of Systems   Constitutional: Negative.    Respiratory: Negative.    Cardiovascular: Negative.    Gastrointestinal: Negative.    Genitourinary: Negative.    Psychiatric/Behavioral: Negative.          Objective   Physical Exam  Vital Signs: /73 Comment: LUE  Pulse 60   Temp 96.6 °F (35.9 °C) (Infrared)   Resp 18   Ht 175.3 cm (69\")   Wt 112 kg (248 lb)   SpO2 99% Comment: RA  BMI 36.62 kg/m²   Vitals:    12/22/21 1356   PainSc: 0-No pain           General Appearance:  alert, cooperative, no apparent distress and appears stated age   Neurologic/Psychiatric: A&O x 3, gait steady, appropriate affect   HEENT:  Normocephalic, without obvious abnormality, mucous membranes moist   Abdomen:   Soft, non-tender, non-distended and no organomegaly   Lymph nodes: No cervical, supraclavicular, inguinal adenopathy noted   Pelvic: External Genitalia  atrophic, without lesions  Vagina  is pale, atrophic.   Vaginal Cuff  Female Vaginal Cuff: smooth, intact and " without visible lesions  Uterus  surgically absent and no palpable masses  Ovaries  surgically absent bilaterally  Parametria  smooth  Rectovaginal  Female rectovaginal: deferred     ECOG score: 0             Result Review :   The following data was reviewed by: EVER Back on 12/22/21:  Last imaging study was CT abdomen pelvis 12/26/2019, last CT chest 6/10/2020.   Tumor marker:     Date Value Ref Range Status   09/22/2021 10.3 0.0 - 38.1 U/mL Final   06/16/2021 10.1 0.0 - 38.1 U/mL Final   03/11/2021 9.0 0.0 - 38.1 U/mL Final   12/10/2020 8.1 0.0 - 38.1 U/mL Final       Procedure Note:  No notes on file         Assessment and Plan:    Diagnoses and all orders for this visit:    1. Papillary serous adenocarcinoma of endometrium (HCC) (Primary)  -                 There is no evidence of disease upon today's exam. She will be notified of CA-125 results upon their return. She is now 2 years out from completion of treatment and doing well. She may now go to every 6 month cancer surveillance visits, pending stable CA-125 now. She is understanding to call with any changes in pelvic symptoms or general GYN concerns at any time between regularly scheduled visits.     Pain assessment was performed today as a part of patient’s care.  For patients with pain related to surgery, gynecologic malignancy or cancer treatment, the plan is as noted in the assessment/plan.  For patients with pain not related to these issues, they are to seek any further needed care from a more appropriate provider, such as PCP.      Follow-up:     Return to clinic in 6 months for ongoing cancer surveillance.      Electronically signed by EVER Back on 12/22/21 at 14:45 EST

## 2021-12-23 ENCOUNTER — TELEPHONE (OUTPATIENT)
Dept: GYNECOLOGIC ONCOLOGY | Facility: CLINIC | Age: 71
End: 2021-12-23

## 2021-12-23 NOTE — TELEPHONE ENCOUNTER
----- Message from EVER Back sent at 12/23/2021  8:10 AM EST -----  Please notify CA-125 low/stable. Thanks!

## 2022-01-24 ENCOUNTER — HOSPITAL ENCOUNTER (OUTPATIENT)
Dept: HOSPITAL 79 - KOH-I | Age: 72
End: 2022-01-24
Attending: PHYSICIAN ASSISTANT
Payer: MEDICARE

## 2022-01-24 DIAGNOSIS — N28.89: Primary | ICD-10-CM

## 2022-01-24 DIAGNOSIS — N27.1: ICD-10-CM

## 2022-01-24 DIAGNOSIS — K76.0: ICD-10-CM

## 2022-03-21 ENCOUNTER — HOSPITAL ENCOUNTER (OUTPATIENT)
Dept: HOSPITAL 79 - HEART 5 | Age: 72
End: 2022-03-21
Attending: INTERNAL MEDICINE
Payer: MEDICARE

## 2022-03-21 DIAGNOSIS — I35.0: Primary | ICD-10-CM

## 2022-03-21 DIAGNOSIS — I51.7: ICD-10-CM

## 2022-03-21 DIAGNOSIS — I51.89: ICD-10-CM

## 2022-06-27 ENCOUNTER — HOSPITAL ENCOUNTER (OUTPATIENT)
Dept: HOSPITAL 79 - LAB | Age: 72
End: 2022-06-27
Attending: INTERNAL MEDICINE
Payer: MEDICARE

## 2022-06-27 DIAGNOSIS — E87.1: Primary | ICD-10-CM

## 2022-06-27 LAB — BUN/CREATININE RATIO: 16 (ref 0–10)

## 2022-06-29 ENCOUNTER — LAB (OUTPATIENT)
Dept: LAB | Facility: HOSPITAL | Age: 72
End: 2022-06-29

## 2022-06-29 ENCOUNTER — OFFICE VISIT (OUTPATIENT)
Dept: GYNECOLOGIC ONCOLOGY | Facility: CLINIC | Age: 72
End: 2022-06-29

## 2022-06-29 VITALS
DIASTOLIC BLOOD PRESSURE: 62 MMHG | HEIGHT: 69 IN | BODY MASS INDEX: 32.98 KG/M2 | HEART RATE: 66 BPM | WEIGHT: 222.7 LBS | RESPIRATION RATE: 16 BRPM | OXYGEN SATURATION: 98 % | SYSTOLIC BLOOD PRESSURE: 121 MMHG

## 2022-06-29 DIAGNOSIS — Z85.42 HISTORY OF ENDOMETRIAL CANCER: Primary | ICD-10-CM

## 2022-06-29 DIAGNOSIS — Z85.42 HISTORY OF ENDOMETRIAL CANCER: ICD-10-CM

## 2022-06-29 LAB — CANCER AG125 SERPL QL: 9.2 U/ML (ref 0–38.1)

## 2022-06-29 PROCEDURE — 86304 IMMUNOASSAY TUMOR CA 125: CPT | Performed by: NURSE PRACTITIONER

## 2022-06-29 PROCEDURE — 36415 COLL VENOUS BLD VENIPUNCTURE: CPT

## 2022-06-29 PROCEDURE — 99212 OFFICE O/P EST SF 10 MIN: CPT | Performed by: NURSE PRACTITIONER

## 2022-06-29 NOTE — PROGRESS NOTES
GYN ONCOLOGY CANCER SURVEILLANCE FOLLOW-UP    Hailey Osborn  [id number]  1950    Subjective   Chief Complaint: Uterine Cancer (No gyn complaints)        History of present illness:     Hailey Osborn is a 71 y.o. 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 She is approaching 3 years since completion of treatment later this summer. She reports she is feeling very well today and has no gyn complaints. She denies vaginal bleeding, pelvic pain, concerning lesions, abdominal fullness/bloating, and changes in bowel or bladder function. CA-125 has been low/stable, due to repeat today. Of note, patient was admitted to the hospital at [facility] last week with hyponatremia. They did a CT during her admission that was negative for disease per her report. She is doing much better and was released to normal activity by her PCP yesterday.        Cancer History:   Oncology/Hematology History   Papillary serous adenocarcinoma of endometrium (HCC)   7/22/2019 Biopsy    Patient presented to gyn, Dr. Patel, with c/o vaginal bleeding x 1 year. EMB revealed high grade papillary serous endometrial cancer.      7/28/2019 -  Other Event    While outpatient Gyn Oncology referral was pending, patient developed heavier vaginal bleeding and symptomatic anemia requiring hospital admission. Inpatient Gyn Oncology consult. Anemia managed with blood transfusions.   CA-125 = 9.8     7/29/2019 Imaging    Pre-op CT negative for chest disease or obvious metastatic disease. Enlarged uterus with underlying mass noted with several small questionable nodes.      8/2/2019 Surgery    RTLH/BSO, omentectomy, and bilateral pelvic lymph node dissection. Final pathology revealed papillary serous adenocarcinoma with invasion into 1.7 cm of 2.5 cm myometrium. No cervical involvement. Tubes, ovaries, omentum, and all nodes negative. No LVSI. MSI normal. 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  Treated with gabapentin.     9/18/2019 - 9/30/2019 Radiation    Radiation OncologyTreatment Course:  Hailey Osborn received 3000 cGy in 5 fractions to upper vagina via High Dose Radiation - HDR.     12/26/2019 Imaging    CT scan chest/abdomen/pelvis: Likely postinflammatory changes of left lung noted.  Repeat CT scan of chest ordered.     3/10/2020 Survivorship    Survivorship Care Plan completed and discussed with patient.  Copy of Survivorship Care Plan provided to patient and primary care provider.     6/10/2020 Imaging    Repeat CT chest showed table old healed granulomatous disease. Nodules are all less than 5 mm in size which remains stable. Nodularity of the left lung base has resolved. No evidence of metastatic disease.           The current medication list and allergy list were reviewed and reconciled.     Past Medical History, Past Surgical History, Social History, Family History have been reviewed and are without significant changes except as mentioned.      Review of Systems   Constitutional: Negative.    Respiratory: Negative.    Cardiovascular: Negative.    Gastrointestinal: Negative.    Genitourinary: Negative.    Psychiatric/Behavioral: Negative.          Objective   Physical Exam  Vital Signs: /62   Pulse 66   Resp 16   Ht 175.3 cm (69.02\")   Wt 101 kg (222 lb 11.2 oz)   SpO2 98%   BMI 32.87 kg/m²   Vitals:    06/29/22 1404   PainSc: 0-No pain           General Appearance:  alert, cooperative, no apparent distress and appears stated age   Neurologic/Psychiatric: A&O x 3, gait steady, appropriate affect   HEENT:  Normocephalic, without obvious abnormality, mucous membranes moist   Abdomen:   Soft, non-tender, non-distended and no organomegaly   Lymph nodes: No cervical, supraclavicular, inguinal " adenopathy noted   Pelvic: External Genitalia  atrophic, without lesions  Vagina  is pale, atrophic.   Vaginal Cuff  Female Vaginal Cuff: smooth, intact and without visible lesions  Uterus  surgically absent and no palpable masses  Ovaries  surgically absent bilaterally  Parametria  smooth  Rectovaginal  Female rectovaginal: deferred     ECOG score: 0             Result Review :   The following data was reviewed by: EVER Back on 06/29/22:  Last imaging study was [facility] last [facility]. We do not have a copy at the time of visit, but this will be requested for review.   Tumor marker:     Date Value Ref Range Status   12/22/2021 9.2 0.0 - 38.1 U/mL Final   09/22/2021 10.3 0.0 - 38.1 U/mL Final   06/16/2021 10.1 0.0 - 38.1 U/mL Final   03/11/2021 9.0 0.0 - 38.1 U/mL Final       Procedure Note:            Assessment and Plan:    Diagnoses and all orders for this visit:    1. History of endometrial cancer (Primary)  -     ; Future        There is no evidence of disease upon today's exam. She will be notified of CA-125 results upon their return. Continue every 6 month cancer surveillance visits, pending stable CA-125. She is understanding to call with any changes in pelvic symptoms or general GYN concerns at any time between regularly scheduled visits.     Outside CT scan record requested. This will be reviewed and uploaded to chart once received.     Pain assessment was performed today as a part of patient’s care.  For patients with pain related to surgery, gynecologic malignancy or cancer treatment, the plan is as noted in the assessment/plan.  For patients with pain not related to these issues, they are to seek any further needed care from a more appropriate provider, such as PCP.      Follow-up:     Return to clinic in 6 months for ongoing cancer surveillance.      Electronically signed by EVER Back on 06/29/22 at 14:29 EDT

## 2022-06-30 ENCOUNTER — TELEPHONE (OUTPATIENT)
Dept: GYNECOLOGIC ONCOLOGY | Facility: CLINIC | Age: 72
End: 2022-06-30

## 2022-06-30 NOTE — TELEPHONE ENCOUNTER
----- Message from EVER Back sent at 6/30/2022  8:21 AM EDT -----  Please notify CA-125 low/stable. Thanks!

## 2022-12-29 ENCOUNTER — OFFICE VISIT (OUTPATIENT)
Dept: GYNECOLOGIC ONCOLOGY | Facility: CLINIC | Age: 72
End: 2022-12-29

## 2022-12-29 ENCOUNTER — LAB (OUTPATIENT)
Dept: LAB | Facility: HOSPITAL | Age: 72
End: 2022-12-29
Payer: MEDICARE

## 2022-12-29 VITALS
SYSTOLIC BLOOD PRESSURE: 153 MMHG | WEIGHT: 241.6 LBS | HEART RATE: 62 BPM | TEMPERATURE: 97.3 F | RESPIRATION RATE: 18 BRPM | HEIGHT: 69 IN | BODY MASS INDEX: 35.78 KG/M2 | DIASTOLIC BLOOD PRESSURE: 72 MMHG | OXYGEN SATURATION: 98 %

## 2022-12-29 DIAGNOSIS — Z85.42 HISTORY OF ENDOMETRIAL CANCER: ICD-10-CM

## 2022-12-29 DIAGNOSIS — B37.2 SKIN YEAST INFECTION: ICD-10-CM

## 2022-12-29 DIAGNOSIS — Z85.42 HISTORY OF ENDOMETRIAL CANCER: Primary | ICD-10-CM

## 2022-12-29 LAB — CANCER AG125 SERPL QL: 10.6 U/ML (ref 0–38.1)

## 2022-12-29 PROCEDURE — 99213 OFFICE O/P EST LOW 20 MIN: CPT | Performed by: NURSE PRACTITIONER

## 2022-12-29 PROCEDURE — 36415 COLL VENOUS BLD VENIPUNCTURE: CPT

## 2022-12-29 PROCEDURE — 86304 IMMUNOASSAY TUMOR CA 125: CPT

## 2022-12-29 RX ORDER — AMLODIPINE BESYLATE 10 MG/1
TABLET ORAL
COMMUNITY
Start: 2022-12-27

## 2022-12-29 RX ORDER — LISINOPRIL 20 MG/1
TABLET ORAL
COMMUNITY
Start: 2022-12-15

## 2022-12-29 RX ORDER — NYSTATIN 100000 [USP'U]/G
POWDER TOPICAL 3 TIMES DAILY PRN
Qty: 30 G | Refills: 1 | Status: SHIPPED | OUTPATIENT
Start: 2022-12-29

## 2022-12-29 NOTE — PROGRESS NOTES
GYN ONCOLOGY CANCER SURVEILLANCE FOLLOW-UP    Hailey Osborn  [id number]  1950    Subjective   Chief Complaint: Uterine Cancer        History of present illness:     Hailey Osborn is a 72 y.o. 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 She is now over 3 years out from completion of treatment. She reports she is feeling very well today and has no gyn complaints. She denies vaginal bleeding, pelvic pain, concerning lesions, abdominal fullness/bloating, and changes in bowel or bladder function. CA-125 has been low/stable, due to repeat today. She requests refills of her nystatin powder.        Cancer History:   Oncology/Hematology History   Papillary serous adenocarcinoma of endometrium (HCC)   7/22/2019 Biopsy    Patient presented to gyn, Dr. Patel, with c/o vaginal bleeding x 1 year. EMB revealed high grade papillary serous endometrial cancer.      7/28/2019 -  Other Event    While outpatient Gyn Oncology referral was pending, patient developed heavier vaginal bleeding and symptomatic anemia requiring hospital admission. Inpatient Gyn Oncology consult. Anemia managed with blood transfusions.   CA-125 = 9.8     7/29/2019 Imaging    Pre-op CT negative for chest disease or obvious metastatic disease. Enlarged uterus with underlying mass noted with several small questionable nodes.      8/2/2019 Surgery    RTLH/BSO, omentectomy, and bilateral pelvic lymph node dissection. Final pathology revealed papillary serous adenocarcinoma with invasion into 1.7 cm of 2.5 cm myometrium. No cervical involvement. Tubes, ovaries, omentum, and all nodes negative. No LVSI. MSI normal. 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  Treated with gabapentin.     9/18/2019 - 9/30/2019 Radiation     "Radiation OncologyTreatment Course:  Hailey Osborn received 3000 cGy in 5 fractions to upper vagina via High Dose Radiation - HDR.     12/26/2019 Imaging    CT scan chest/abdomen/pelvis: Likely postinflammatory changes of left lung noted.  Repeat CT scan of chest ordered.     3/10/2020 Survivorship    Survivorship Care Plan completed and discussed with patient.  Copy of Survivorship Care Plan provided to patient and primary care provider.     6/10/2020 Imaging    Repeat CT chest showed table old healed granulomatous disease. Nodules are all less than 5 mm in size which remains stable. Nodularity of the left lung base has resolved. No evidence of metastatic disease.           The current medication list and allergy list were reviewed and reconciled.     Past Medical History, Past Surgical History, Social History, Family History have been reviewed and are without significant changes except as mentioned.      Review of Systems   Constitutional: Negative.    Respiratory: Negative.    Cardiovascular: Negative.    Gastrointestinal: Negative.    Genitourinary: Negative.    Psychiatric/Behavioral: Negative.          Objective   Physical Exam  Vital Signs: /72   Pulse 62   Temp 97.3 °F (36.3 °C) (Infrared)   Resp 18   Ht 175.3 cm (69.02\")   Wt 110 kg (241 lb 9.6 oz)   SpO2 98%   BMI 35.66 kg/m²   Vitals:    12/29/22 1322   PainSc: 0-No pain           General Appearance:  alert, cooperative, no apparent distress and appears stated age   Neurologic/Psychiatric: A&O x 3, gait steady, appropriate affect   HEENT:  Normocephalic, without obvious abnormality, mucous membranes moist   Abdomen:   Soft, non-tender, non-distended and no organomegaly. Mild persistent skin yeast to lower abdomen.   Lymph nodes: No cervical, supraclavicular, inguinal adenopathy noted   Pelvic: External Genitalia  atrophic, without lesions  Vagina  is pale, atrophic.   Vaginal Cuff  Female Vaginal Cuff: smooth, intact and without visible " lesions  Uterus  surgically absent and no palpable masses  Ovaries  surgically absent bilaterally  Parametria  smooth  Rectovaginal  Female rectovaginal: deferred     ECOG score: 0             Result Review :   The following data was reviewed by: EVER Back on 12/29/22:  Last imaging study was CT 6/29/2022 at [facility]  Tumor marker:     Date Value Ref Range Status   06/29/2022 9.2 0.0 - 38.1 U/mL Final   12/22/2021 9.2 0.0 - 38.1 U/mL Final   09/22/2021 10.3 0.0 - 38.1 U/mL Final   06/16/2021 10.1 0.0 - 38.1 U/mL Final       Procedure Note:            Assessment and Plan:    Diagnoses and all orders for this visit:    1. History of endometrial cancer (Primary)  -     ; Future    2. Skin yeast infection    Other orders  -     nystatin (MYCOSTATIN) 855511 UNIT/GM powder; Apply  topically to the appropriate area as directed 3 (Three) Times a Day As Needed (yeast rash).  Dispense: 30 g; Refill: 1        There is no evidence of disease upon today's exam. She will be notified of CA-125 results upon their return. Continue every 6 month cancer surveillance visits, pending stable CA-125. She is understanding to call with any changes in pelvic symptoms or general GYN concerns at any time between regularly scheduled visits.     Nystatin powder refilled for management of intermittent skin yeast.     Pain assessment was performed today as a part of patient’s care.  For patients with pain related to surgery, gynecologic malignancy or cancer treatment, the plan is as noted in the assessment/plan.  For patients with pain not related to these issues, they are to seek any further needed care from a more appropriate provider, such as PCP.      Follow-up:     Return to clinic in 6 months for ongoing cancer surveillance.      Electronically signed by EVER Back on 12/29/22 at 13:58 EST

## 2022-12-30 ENCOUNTER — TELEPHONE (OUTPATIENT)
Dept: ONCOLOGY | Facility: CLINIC | Age: 72
End: 2022-12-30

## 2022-12-30 NOTE — TELEPHONE ENCOUNTER
----- Message from EVER Back sent at 12/30/2022  7:59 AM EST -----  Please notify CA-125 low/stable. Thanks!

## 2022-12-30 NOTE — TELEPHONE ENCOUNTER
Call to Hailey to notify that CA-125 is low and normal.  Hailey concerned about taking multi vitamin with 30mcgs of biotin that could reduce results.  Explained to Hailey that 30 mcgs would not reduce the result significantly.  Hailey states understood

## 2023-01-27 ENCOUNTER — HOSPITAL ENCOUNTER (OUTPATIENT)
Dept: MAMMOGRAPHY | Facility: HOSPITAL | Age: 73
Discharge: HOME | End: 2023-01-27
Admitting: PHYSICIAN ASSISTANT
Payer: MEDICARE

## 2023-01-27 DIAGNOSIS — Z12.31 VISIT FOR SCREENING MAMMOGRAM: ICD-10-CM

## 2023-01-27 PROCEDURE — 77063 BREAST TOMOSYNTHESIS BI: CPT

## 2023-01-27 PROCEDURE — 77063 BREAST TOMOSYNTHESIS BI: CPT | Performed by: RADIOLOGY

## 2023-01-27 PROCEDURE — 77067 SCR MAMMO BI INCL CAD: CPT

## 2023-01-27 PROCEDURE — 77067 SCR MAMMO BI INCL CAD: CPT | Performed by: RADIOLOGY

## 2023-06-29 PROBLEM — I48.0 PAROXYSMAL ATRIAL FIBRILLATION: Chronic | Status: ACTIVE | Noted: 2023-06-29

## 2023-06-29 PROBLEM — I35.0 AORTIC STENOSIS: Status: ACTIVE | Noted: 2023-06-29

## 2023-06-29 PROBLEM — E03.9 HYPOTHYROIDISM: Status: ACTIVE | Noted: 2023-06-29

## 2023-06-29 PROBLEM — I10 HIGH BLOOD PRESSURE: Status: ACTIVE | Noted: 2023-06-29

## 2023-10-03 ENCOUNTER — TRANSCRIBE ORDERS (OUTPATIENT)
Dept: ADMINISTRATIVE | Facility: HOSPITAL | Age: 73
End: 2023-10-03
Payer: MEDICARE

## 2023-10-03 DIAGNOSIS — N18.9 CHRONIC KIDNEY DISEASE, UNSPECIFIED CKD STAGE: Primary | ICD-10-CM

## 2023-10-08 NOTE — PROGRESS NOTES
Procedure   Procedures      09/18/2019  Hailey Osborn    [facility]#   1  Patient presents for cylinder brachytherapy.  She has no complaints of urinary discomfort, diarrhea or vaginal irritation.  The cylinder was inserted and treatment of 6 Gy to the surface of the upper vaginal mucosa was delivered. Patient tolerated procedure well with no complications.  Physics survey was negative with no residual radioactivity.  Discharged to home in good condition.  She will return for next treatment.      
minutes on the discharge service.

## 2023-10-09 ENCOUNTER — HOSPITAL ENCOUNTER (OUTPATIENT)
Dept: ULTRASOUND IMAGING | Facility: HOSPITAL | Age: 73
Discharge: HOME | End: 2023-10-09
Admitting: PHYSICIAN ASSISTANT
Payer: MEDICARE

## 2023-10-09 DIAGNOSIS — N18.9 CHRONIC KIDNEY DISEASE, UNSPECIFIED CKD STAGE: ICD-10-CM

## 2023-10-09 PROCEDURE — 76775 US EXAM ABDO BACK WALL LIM: CPT

## 2023-12-21 ENCOUNTER — LAB (OUTPATIENT)
Dept: LAB | Facility: HOSPITAL | Age: 73
End: 2023-12-21
Payer: MEDICARE

## 2023-12-21 ENCOUNTER — OFFICE VISIT (OUTPATIENT)
Dept: GYNECOLOGIC ONCOLOGY | Facility: CLINIC | Age: 73
End: 2023-12-21
Payer: MEDICARE

## 2023-12-21 VITALS
OXYGEN SATURATION: 97 % | RESPIRATION RATE: 18 BRPM | SYSTOLIC BLOOD PRESSURE: 131 MMHG | BODY MASS INDEX: 36.21 KG/M2 | TEMPERATURE: 98.5 F | HEIGHT: 69 IN | DIASTOLIC BLOOD PRESSURE: 65 MMHG | WEIGHT: 244.5 LBS | HEART RATE: 54 BPM

## 2023-12-21 DIAGNOSIS — Z85.42 HISTORY OF ENDOMETRIAL CANCER: ICD-10-CM

## 2023-12-21 DIAGNOSIS — Z85.42 HISTORY OF ENDOMETRIAL CANCER: Primary | ICD-10-CM

## 2023-12-21 LAB — CANCER AG125 SERPL QL: 10 U/ML (ref 0–38.1)

## 2023-12-21 PROCEDURE — 86304 IMMUNOASSAY TUMOR CA 125: CPT

## 2023-12-21 PROCEDURE — 36415 COLL VENOUS BLD VENIPUNCTURE: CPT

## 2023-12-21 RX ORDER — EMPAGLIFLOZIN 25 MG/1
25 TABLET, FILM COATED ORAL DAILY
COMMUNITY

## 2023-12-21 RX ORDER — LISINOPRIL 10 MG/1
10 TABLET ORAL DAILY
COMMUNITY

## 2023-12-21 NOTE — PROGRESS NOTES
GYN ONCOLOGY CANCER SURVEILLANCE FOLLOW-UP    Hailey Osborn  [id number]  1950    Subjective   Chief Complaint: Uterine Cancer (No gyn complaints)        History of present illness:     Hailey Osborn is a 73 y.o. 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 She is over 4 years out from completion of treatment. She reports she is feeling very well today and has no gyn complaints. She denies vaginal bleeding, pelvic pain, concerning lesions, abdominal fullness/bloating, and changes in bowel or bladder function. CA-125 has been low/stable, due to repeat today. She is being followed closely by her PCP, urologist, and cardiologist for management of chronic conditions.     Of note: Patient has [employer] and has been notified that our office will be out-of-network in the new year. She has applied for and received continuity of care, however, this is only through March 2024 presently. She plans to follow-up with them to extend the coverage through her 5 year angélica.          Cancer History:   Oncology/Hematology History   Papillary serous adenocarcinoma of endometrium   7/22/2019 Biopsy    Patient presented to gyn, Dr. Patel, with c/o vaginal bleeding x 1 year. EMB revealed high grade papillary serous endometrial cancer.      7/28/2019 -  Other Event    While outpatient Gyn Oncology referral was pending, patient developed heavier vaginal bleeding and symptomatic anemia requiring hospital admission. Inpatient Gyn Oncology consult. Anemia managed with blood transfusions.   CA-125 = 9.8     7/29/2019 Imaging    Pre-op CT negative for chest disease or obvious metastatic disease. Enlarged uterus with underlying mass noted with several small questionable nodes.      8/2/2019 Surgery    RTLH/BSO, omentectomy, and bilateral pelvic lymph node dissection. Final pathology revealed papillary serous adenocarcinoma with invasion into 1.7 cm of 2.5  "cm myometrium. No cervical involvement. Tubes, ovaries, omentum, and all nodes negative. No LVSI. MSI normal. 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  Treated with gabapentin.     9/18/2019 - 9/30/2019 Radiation    Radiation OncologyTreatment Course:  Hailey Osborn received 3000 cGy in 5 fractions to upper vagina via High Dose Radiation - HDR.     12/26/2019 Imaging    CT scan chest/abdomen/pelvis: Likely postinflammatory changes of left lung noted.  Repeat CT scan of chest ordered.     3/10/2020 Survivorship    Survivorship Care Plan completed and discussed with patient.  Copy of Survivorship Care Plan provided to patient and primary care provider.     6/10/2020 Imaging    Repeat CT chest showed table old healed granulomatous disease. Nodules are all less than 5 mm in size which remains stable. Nodularity of the left lung base has resolved. No evidence of metastatic disease.           The current medication list and allergy list were reviewed and reconciled.     Past Medical History, Past Surgical History, Social History, Family History have been reviewed and are without significant changes except as mentioned.      Review of Systems   Constitutional: Negative.    Gastrointestinal: Negative.    Genitourinary: Negative.    Psychiatric/Behavioral: Negative.             Objective   Physical Exam  Vital Signs: /65   Pulse 54   Temp 98.5 °F (36.9 °C) (Temporal)   Resp 18   Ht 175.3 cm (69.02\")   Wt 111 kg (244 lb 8 oz)   SpO2 97%   BMI 36.09 kg/m²   Vitals:    12/21/23 1355   PainSc: 0-No pain           General Appearance:  alert, cooperative, no apparent distress and appears stated age   Neurologic/Psych: A&O x 3, gait steady, appropriate affect   HEENT:  Normocephalic, without obvious abnormality, mucous membranes moist   Abdomen:   Soft, " non-tender, non-distended and no organomegaly.    Lymph nodes: No cervical, supraclavicular, inguinal adenopathy noted   Pelvic: External Genitalia  atrophic, without lesions  Vagina  is pale, atrophic.   Vaginal Cuff  Female Vaginal Cuff: smooth, intact and without visible lesions  Uterus  surgically absent and no palpable masses  Ovaries  surgically absent bilaterally  Parametria  smooth  Rectovaginal  Female rectovaginal: deferred     ECOG score: 0             Result Review :   The following data was reviewed by: EVER Back on 12/21/23:  Last imaging study was CT 6/29/2022 at [facility]  Tumor marker:     Date Value Ref Range Status   06/29/2023 9.0 0.0 - 38.1 U/mL Final   12/29/2022 10.6 0.0 - 38.1 U/mL Final   06/29/2022 9.2 0.0 - 38.1 U/mL Final   12/22/2021 9.2 0.0 - 38.1 U/mL Final       Procedure Note:            Assessment and Plan:    Diagnoses and all orders for this visit:    1. History of endometrial cancer (Primary)  -     ; Future            There is no evidence of disease upon today's exam. She will be notified of CA-125 results upon their return. Continue every 6 month cancer surveillance visits until the 5 year angélica, pending stable CA-125. She is understanding to call with any changes in pelvic symptoms or general GYN concerns at any time between regularly scheduled visits.     Recommend patient continue cancer surveillance with our office for one additional year. This clinical note may be used as documentation if she applies for an extension of her continuity of care with [facility].     Keep follow-ups with PCP, urology, and cardiology as scheduled.     Pain assessment was performed today as a part of patient’s care.  For patients with pain related to surgery, gynecologic malignancy or cancer treatment, the plan is as noted in the assessment/plan.  For patients with pain not related to these issues, they are to seek any further needed care from a more  appropriate provider, such as PCP.      Follow-up:     Return to clinic in 6 months for ongoing cancer surveillance.      Electronically signed by EVER Back on 12/21/23 at 14:41 EST       Fall risk

## 2023-12-22 ENCOUNTER — TELEPHONE (OUTPATIENT)
Dept: GYNECOLOGIC ONCOLOGY | Facility: CLINIC | Age: 73
End: 2023-12-22
Payer: MEDICARE

## 2023-12-22 NOTE — TELEPHONE ENCOUNTER
----- Message from EVER Back sent at 12/22/2023  8:08 AM EST -----  Please notify CA-125 low/stable. Thanks!

## 2024-01-18 ENCOUNTER — TRANSCRIBE ORDERS (OUTPATIENT)
Dept: ADMINISTRATIVE | Facility: HOSPITAL | Age: 74
End: 2024-01-18
Payer: MEDICARE

## 2024-01-18 DIAGNOSIS — Z12.31 VISIT FOR SCREENING MAMMOGRAM: Primary | ICD-10-CM

## 2024-06-20 NOTE — PROGRESS NOTES
Hailey Osborn  [id number]  1950      Reason for visit:  uterine cancer surveillance     History of present illness:  Hailey Osborn is a 73 y.o. 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 She is over 4.5 years out from completion of treatment. She reports she is feeling very well today and has no gyn complaints. She denies vaginal bleeding, pelvic pain, abdominal fullness/bloating, and changes in bowel or bladder function. CA-125 has been low/stable, due to repeat today. S     Oncologic History:  Oncology/Hematology History   Papillary serous adenocarcinoma of endometrium   7/22/2019 Biopsy    Patient presented to gyn, Dr. Patel, with c/o vaginal bleeding x 1 year. EMB revealed high grade papillary serous endometrial cancer.      7/28/2019 -  Other Event    While outpatient Gyn Oncology referral was pending, patient developed heavier vaginal bleeding and symptomatic anemia requiring hospital admission. Inpatient Gyn Oncology consult. Anemia managed with blood transfusions.   CA-125 = 9.8     7/29/2019 Imaging    Pre-op CT negative for chest disease or obvious metastatic disease. Enlarged uterus with underlying mass noted with several small questionable nodes.      8/2/2019 Surgery    RTLH/BSO, omentectomy, and bilateral pelvic lymph node dissection. Final pathology revealed papillary serous adenocarcinoma with invasion into 1.7 cm of 2.5 cm myometrium. No cervical involvement. Tubes, ovaries, omentum, and all nodes negative. No LVSI. MSI normal. 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  Treated with gabapentin.     9/18/2019 - 9/30/2019 Radiation    Radiation OncologyTreatment Course:  Hailey Osborn received 3000 cGy in 5 fractions to upper vagina via High Dose Radiation -  HDR.     2019 Imaging    CT scan chest/abdomen/pelvis: Likely postinflammatory changes of left lung noted.  Repeat CT scan of chest ordered.     3/10/2020 Survivorship    Survivorship Care Plan completed and discussed with patient.  Copy of Survivorship Care Plan provided to patient and primary care provider.     6/10/2020 Imaging    Repeat CT chest showed table old healed granulomatous disease. Nodules are all less than 5 mm in size which remains stable. Nodularity of the left lung base has resolved. No evidence of metastatic disease.           Past Medical History:   Diagnosis Date    Endometrial cancer     Stage IB grade 3 pap serous    Hyperlipidemia     Hypertension     Murmur     Pre-diabetes        Past Surgical History:   Procedure Laterality Date    APPENDECTOMY       SECTION      NODE DISSECTION LAPAROSCOPIC N/A 2019    Procedure: PELVIC LYMPH NODE DISSECTION LAPAROSCOPIC WITH DAVINCI ROBOT BILATERAL;  Surgeon: Lolis Aquino MD;  Location: [facility] OR;  Service: DaVinci    TOTAL LAPAROSCOPIC HYSTERECTOMY N/A 2019    Procedure: RADICAL TOTAL LAPAROSCOPIC HYSTERECTOMY BILATERAL SALPINGECTOMY WITH DAVINCI ROBOT, OMENTUMECTOMY;  Surgeon: Lolis Aquino MD;  Location: [facility] OR;  Service: DaVinci       MEDICATIONS:    Current Outpatient Medications:     amLODIPine (NORVASC) 10 MG tablet, , Disp: , Rfl:     apixaban (ELIQUIS) 5 MG tablet tablet, Take 1 tablet by mouth 2 (Two) Times a Day., Disp: , Rfl:     atorvastatin (LIPITOR) 20 MG tablet, , Disp: , Rfl:     Cholecalciferol (VITAMIN D3 PO), Take 1 capsule by mouth Daily., Disp: , Rfl:     isosorbide mononitrate (IMDUR) 30 MG 24 hr tablet, Take 1 tablet by mouth Daily., Disp: , Rfl:     Jardiance 25 MG tablet tablet, Take 1 tablet by mouth Daily., Disp: , Rfl:     levothyroxine (SYNTHROID, LEVOTHROID) 50 MCG tablet, , Disp: , Rfl:     lisinopril (PRINIVIL,ZESTRIL) 10 MG tablet, Take 1 tablet by mouth Daily. for blood pressure.,  Disp: , Rfl:     metoprolol tartrate (LOPRESSOR) 50 MG tablet, 20 mg 2 (Two) Times a Day., Disp: , Rfl:     Multiple Vitamins-Minerals (MULTIVITAMIN PO), Take 1 tablet by mouth Daily., Disp: , Rfl:     nystatin (MYCOSTATIN) 239462 UNIT/GM powder, Apply  topically to the appropriate area as directed 3 (Three) Times a Day As Needed (yeast rash)., Disp: 30 g, Rfl: 1    Omega-3 Fatty Acids (FISH OIL PO), Take 1 capsule by mouth 2 (Two) Times a Day., Disp: , Rfl:      Allergies:  is allergic to codeine and hydrochlorothiazide.    Social History:   Social History     Socioeconomic History    Marital status:      Spouse name: Benedicto   Tobacco Use    Smoking status: Former     Current packs/day: 0.00     Types: Cigarettes     Quit date:      Years since quittin.5    Smokeless tobacco: Never    Tobacco comments:     quit    Vaping Use    Vaping status: Never Used   Substance and Sexual Activity    Alcohol use: No    Drug use: No    Sexual activity: Defer       Family History:    Family History   Problem Relation Age of Onset    Pancreatic cancer Brother     Breast cancer Neg Hx        Health Maintenance:    Health Maintenance   Topic Date Due    URINE MICROALBUMIN  Never done    DXA SCAN  Never done    BMI FOLLOWUP  Never done    COLORECTAL CANCER SCREENING  Never done    Pneumococcal Vaccine 65+ (1 of 2 - PCV) Never done    DIABETIC EYE EXAM  Never done    ZOSTER VACCINE (1 of 2) Never done    RSV Vaccine - Adults (1 - 1-dose 60+ series) Never done    HEPATITIS C SCREENING  Never done    ANNUAL WELLNESS VISIT  Never done    DIABETIC FOOT EXAM  Never done    HEMOGLOBIN A1C  2020    COVID-19 Vaccine (3 - 2023-24 season) 2023    LIPID PANEL  2023    INFLUENZA VACCINE  2024    MAMMOGRAM  2025    TDAP/TD VACCINES (2 - Td or Tdap) 2033       Review of Systems:  Please refer to history of present illness.  Review of systems otherwise negative.    Physical Exam:  Vitals:     "06/21/24 1322   BP: 136/79   Pulse: 73   Resp: 17   Temp: 97.1 °F (36.2 °C)   TempSrc: Temporal   SpO2: 96%   Weight: 106 kg (234 lb 11.2 oz)   Height: 175.3 cm (69.02\")   PainSc: 0-No pain     Body mass index is 34.64 kg/m².  Wt Readings from Last 3 Encounters:   06/21/24 106 kg (234 lb 11.2 oz)   12/21/23 111 kg (244 lb 8 oz)   06/29/23 112 kg (246 lb 9.6 oz)     GENERAL: Alert, well-appearing female appearing her stated age who is in no apparent distress.   HEENT: Sclera anicteric. Head normocephalic, atraumatic. Mucus membranes moist.   NECK: Trachea midline, supple, without masses.  No thyromegaly.   BREASTS: Deferred  CARDIOVASCULAR: Normal rate, regular rhythm, no murmurs, rubs, or gallops.  No peripheral edema.  RESPIRATORY: Clear to auscultation bilaterally, normal respiratory effort  BACK:  No CVA tenderness, no vertebral tenderness on palpation  GASTROINTESTINAL:  Abdomen is soft, non-tender, non-distended, no rebound or guarding, no masses, or hernias. No HSM.    SKIN:  Warm, dry, well-perfused.  All visible areas intact.  No rashes, lesions, ulcers.  PSYCHIATRIC: AO x3, with appropriate affect, normal thought processes.  NEUROLOGIC: No focal deficits.  Moves extremities well.  MUSCULOSKELETAL: Normal gait and station.   EXTREMITIES:   No cyanosis, clubbing, symmetric.  LYMPHATICS:  No cervical or inguinal adenopathy noted.     PELVIC exam:  External genitalia are free from lesion. On speculum examination, the vaginal cuff was intact and no lesions were appreciated.  On bimanual examination, no fullness was appreciated.  Uterus, cervix and adnexa were absent.  There was no significant tenderness.  Rectovaginal exam was deferred.    ECOG PS     PROCEDURES:  None    Diagnostic Data:    No Images in the past 120 days found..    Lab Results   Component Value Date    WBC 3.80 12/12/2019    HGB 9.0 (L) 12/12/2019    HCT 28.3 (L) 12/12/2019    MCV 98.8 (H) 12/12/2019     12/12/2019    NEUTROABS 2.30 " "12/12/2019    GLUCOSE 172 (H) 12/05/2019    BUN 20 12/05/2019    CREATININE 1.00 06/10/2020    EGFRIFNONA 72 12/05/2019     12/05/2019    K 4.5 12/05/2019    CL 99 12/05/2019    CO2 22.0 12/05/2019    CALCIUM 9.8 12/05/2019    ALBUMIN 4.30 12/05/2019    AST 19 12/05/2019    ALT 20 12/05/2019    BILITOT 0.3 12/05/2019     Lab Results   Component Value Date    TSH 2.210 07/29/2019     No results found for: \"FT4\"  Lab Results   Component Value Date     9.5 06/21/2024     10.0 12/21/2023     9.0 06/29/2023     10.6 12/29/2022     9.2 06/29/2022     9.2 12/22/2021     10.3 09/22/2021     10.1 06/16/2021     9.0 03/11/2021     8.1 12/10/2020       Assessment & Plan   There is no evidence of disease upon today's exam  She will be notified of CA-125 results upon their return  Continue every 6 month cancer surveillance visits until the 5 year angélica, pending stable CA-125 (final appt December 2024)  She is understanding to call with any changes in pelvic symptoms or general GYN concerns at any time between regularly scheduled visits.   Encourage continuation of healthy lifestyle, patient doing water aerobics 4 times a week    Encounter Diagnosis   Name Primary?    Papillary serous adenocarcinoma of endometrium Yes     Pain assessment was performed today as a part of patient’s care.  For patients with pain related to surgery, gynecologic malignancy or cancer treatment, the plan is as noted in the assessment/plan.  For patients with pain not related to these issues, they are to seek any further needed care from a more appropriate provider, such as PCP.      Orders Placed This Encounter   Procedures         Standing Status:   Future     Number of Occurrences:   1     Standing Expiration Date:   6/21/2025     Order Specific Question:   Release to patient     Answer:   Routine Release [[id number]]       FOLLOW UP: 6 months with Estefani Kumar can " transition to primary OBGYN     I spent 23 minutes caring for Hailey on this date of service. This time includes time spent by me in the following activities: preparing for the visit, reviewing tests, performing a medically appropriate examination and/or evaluation, counseling and educating the patient/family/caregiver, referring and communicating with other health care professionals, documenting information in the medical record, and care coordination      Patient was seen and examined with Dr. Govea,  resident, who performed portions of the examination and documentation for this patient's care under my direct supervision.  I agree with the above documentation and plan.    Lolis Aquino MD  06/23/24  22:10 EDT

## 2024-06-21 ENCOUNTER — OFFICE VISIT (OUTPATIENT)
Dept: GYNECOLOGIC ONCOLOGY | Facility: CLINIC | Age: 74
End: 2024-06-21
Payer: MEDICARE

## 2024-06-21 ENCOUNTER — LAB (OUTPATIENT)
Dept: LAB | Facility: HOSPITAL | Age: 74
End: 2024-06-21
Payer: MEDICARE

## 2024-06-21 VITALS
TEMPERATURE: 97.1 F | RESPIRATION RATE: 17 BRPM | SYSTOLIC BLOOD PRESSURE: 136 MMHG | HEIGHT: 69 IN | BODY MASS INDEX: 34.76 KG/M2 | DIASTOLIC BLOOD PRESSURE: 79 MMHG | HEART RATE: 73 BPM | WEIGHT: 234.7 LBS | OXYGEN SATURATION: 96 %

## 2024-06-21 DIAGNOSIS — C54.1 PAPILLARY SEROUS ADENOCARCINOMA OF ENDOMETRIUM: ICD-10-CM

## 2024-06-21 DIAGNOSIS — C54.1 PAPILLARY SEROUS ADENOCARCINOMA OF ENDOMETRIUM: Primary | ICD-10-CM

## 2024-06-21 LAB — CANCER AG125 SERPL QL: 9.5 U/ML (ref 0–38.1)

## 2024-06-21 PROCEDURE — 3075F SYST BP GE 130 - 139MM HG: CPT | Performed by: OBSTETRICS & GYNECOLOGY

## 2024-06-21 PROCEDURE — 1160F RVW MEDS BY RX/DR IN RCRD: CPT | Performed by: OBSTETRICS & GYNECOLOGY

## 2024-06-21 PROCEDURE — 1126F AMNT PAIN NOTED NONE PRSNT: CPT | Performed by: OBSTETRICS & GYNECOLOGY

## 2024-06-21 PROCEDURE — 36415 COLL VENOUS BLD VENIPUNCTURE: CPT

## 2024-06-21 PROCEDURE — 99213 OFFICE O/P EST LOW 20 MIN: CPT | Performed by: OBSTETRICS & GYNECOLOGY

## 2024-06-21 PROCEDURE — 1159F MED LIST DOCD IN RCRD: CPT | Performed by: OBSTETRICS & GYNECOLOGY

## 2024-06-21 PROCEDURE — 86304 IMMUNOASSAY TUMOR CA 125: CPT

## 2024-06-21 PROCEDURE — 3078F DIAST BP <80 MM HG: CPT | Performed by: OBSTETRICS & GYNECOLOGY

## 2024-06-24 ENCOUNTER — TELEPHONE (OUTPATIENT)
Dept: GYNECOLOGIC ONCOLOGY | Facility: CLINIC | Age: 74
End: 2024-06-24
Payer: MEDICARE

## 2024-06-24 NOTE — TELEPHONE ENCOUNTER
Patient notified of  results via self identifying voice mail with office number provided for any questions/concerns.

## 2024-06-24 NOTE — TELEPHONE ENCOUNTER
----- Message from Lolis Aquino sent at 6/22/2024  7:50 AM EDT -----  Please notify patient of normal  level.  Thank you!  ----- Message -----  From: Lab, Background User  Sent: 6/21/2024  11:51 PM EDT  To: Lolis Aquino MD

## 2024-12-16 ENCOUNTER — OFFICE VISIT (OUTPATIENT)
Dept: GYNECOLOGIC ONCOLOGY | Facility: CLINIC | Age: 74
End: 2024-12-16
Payer: MEDICARE

## 2024-12-16 ENCOUNTER — LAB (OUTPATIENT)
Dept: LAB | Facility: HOSPITAL | Age: 74
End: 2024-12-16
Payer: MEDICARE

## 2024-12-16 VITALS
RESPIRATION RATE: 17 BRPM | WEIGHT: 238.8 LBS | OXYGEN SATURATION: 96 % | HEART RATE: 60 BPM | TEMPERATURE: 97.1 F | BODY MASS INDEX: 35.37 KG/M2 | HEIGHT: 69 IN | DIASTOLIC BLOOD PRESSURE: 70 MMHG | SYSTOLIC BLOOD PRESSURE: 153 MMHG

## 2024-12-16 DIAGNOSIS — Z85.42 HISTORY OF ENDOMETRIAL CANCER: Primary | ICD-10-CM

## 2024-12-16 DIAGNOSIS — Z85.42 HISTORY OF ENDOMETRIAL CANCER: ICD-10-CM

## 2024-12-16 LAB — CANCER AG125 SERPL QL: 8.7 U/ML (ref 0–38.1)

## 2024-12-16 PROCEDURE — 99213 OFFICE O/P EST LOW 20 MIN: CPT | Performed by: NURSE PRACTITIONER

## 2024-12-16 PROCEDURE — 1126F AMNT PAIN NOTED NONE PRSNT: CPT | Performed by: NURSE PRACTITIONER

## 2024-12-16 PROCEDURE — 3077F SYST BP >= 140 MM HG: CPT | Performed by: NURSE PRACTITIONER

## 2024-12-16 PROCEDURE — 86304 IMMUNOASSAY TUMOR CA 125: CPT

## 2024-12-16 PROCEDURE — 1159F MED LIST DOCD IN RCRD: CPT | Performed by: NURSE PRACTITIONER

## 2024-12-16 PROCEDURE — 3078F DIAST BP <80 MM HG: CPT | Performed by: NURSE PRACTITIONER

## 2024-12-16 PROCEDURE — 1160F RVW MEDS BY RX/DR IN RCRD: CPT | Performed by: NURSE PRACTITIONER

## 2024-12-16 PROCEDURE — 36415 COLL VENOUS BLD VENIPUNCTURE: CPT

## 2024-12-16 NOTE — PROGRESS NOTES
GYN ONCOLOGY CANCER SURVEILLANCE FOLLOW-UP    Hailey Osborn  [id number]  1950    Subjective   Chief Complaint: Follow up, h/o endometrial cancer       History of present illness:   Hailey Osborn is a 74 y.o. year old female who is here today for ongoing surveillance of Endometrial Cancer, see Cancer History. In September she celebrated her 5 year anniversary. Her  has remained low/ stable, due to repeat today. She is doing well and has no GYN complaints at this time.     Cancer History:   Oncology/Hematology History   Papillary serous adenocarcinoma of endometrium   7/22/2019 Biopsy    Patient presented to gyn, Dr. Patel, with c/o vaginal bleeding x 1 year. EMB revealed high grade papillary serous endometrial cancer.      7/28/2019 -  Other Event    While outpatient Gyn Oncology referral was pending, patient developed heavier vaginal bleeding and symptomatic anemia requiring hospital admission. Inpatient Gyn Oncology consult. Anemia managed with blood transfusions.   CA-125 = 9.8     7/29/2019 Imaging    Pre-op CT negative for chest disease or obvious metastatic disease. Enlarged uterus with underlying mass noted with several small questionable nodes.      8/2/2019 Surgery    RTLH/BSO, omentectomy, and bilateral pelvic lymph node dissection. Final pathology revealed papillary serous adenocarcinoma with invasion into 1.7 cm of 2.5 cm myometrium. No cervical involvement. Tubes, ovaries, omentum, and all nodes negative. No LVSI. MSI normal. 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  Treated with gabapentin.     9/18/2019 - 9/30/2019 Radiation    Radiation OncologyTreatment Course:  Hailey Osborn received 3000 cGy in 5 fractions to upper vagina via High Dose Radiation - HDR.     12/26/2019 Imaging    CT scan chest/abdomen/pelvis: Likely  "postinflammatory changes of left lung noted.  Repeat CT scan of chest ordered.     3/10/2020 Survivorship    Survivorship Care Plan completed and discussed with patient.  Copy of Survivorship Care Plan provided to patient and primary care provider.     6/10/2020 Imaging    Repeat CT chest showed table old healed granulomatous disease. Nodules are all less than 5 mm in size which remains stable. Nodularity of the left lung base has resolved. No evidence of metastatic disease.           The current medication list and allergy list were reviewed and reconciled.     Past Medical History, Past Surgical History, Social History, Family History have been reviewed and are without significant changes except as mentioned.      Review of Systems   Constitutional: Negative.    Gastrointestinal: Negative.    Genitourinary: Negative.    Psychiatric/Behavioral: Negative.             Objective   Physical Exam  Vital Signs: /70   Pulse 60   Temp 97.1 °F (36.2 °C) (Temporal)   Resp 17   Ht 175.3 cm (69.02\")   Wt 108 kg (238 lb 12.8 oz)   SpO2 96%   BMI 35.25 kg/m²   Vitals:    12/16/24 1344   PainSc: 0-No pain           General Appearance:  alert, cooperative, no apparent distress and appears stated age   Neurologic/Psych: A&O x 3, gait steady, appropriate affect   HEENT:  Normocephalic, without obvious abnormality, mucous membranes moist   Abdomen:   Soft, non-tender, non-distended, and no organomegaly   Lymph nodes: No cervical, supraclavicular, inguinal adenopathy noted   Pelvic: External genitalia are free from lesion. On speculum examination, the vaginal cuff was intact and no lesions were appreciated. On bimanual examination, no fullness was appreciated. Uterus, cervix and adnexa were absent. There was no significant tenderness. Rectovaginal exam was deferred.      ECOG score: 0             Result Review :{  Tumor marker:     Date Value Ref Range Status   06/21/2024 9.5 0.0 - 38.1 U/mL Final   12/21/2023 10.0 " 0.0 - 38.1 U/mL Final   06/29/2023 9.0 0.0 - 38.1 U/mL Final   12/29/2022 10.6 0.0 - 38.1 U/mL Final       PHQ-9 Total Score:      Procedure Note:            Assessment and Plan:   Hailey Osborn  is a 74 y.o. with a history of a stage IB grade 3 who completed treatment in 9/2019. I am happy to report that she is in remission after 5 years of observation. I have spoken to her about the symptoms to report and recommended that she be seen annually for disease surveillance. The patient and family are very appreciative of the care that they have received over the last 5 years.    She is aware she will require continued surveillance with pelvic examinations, but no longer requires cytology (Pap tests). Signs of recurrent disease, such as vaginal bleeding, abdominopelvic pain, urinary or bowel changes, and shortness of breath were reviewed with the patient.  She was advised to follow up immediately if she develops any of the above symptoms.     She was also reminded to maintain a healthy lifestyle with a well-balanced diet, calcium and vitamin D for osteoporosis prevention, and exercise as well as continue with recommended health and cancer screening guidelines.     I have included a review of her disease history.    She was advised it is okay to follow-up with general GYN moving forward.  She has not kept in touch with local provider and prefers 1 more visit here which I am okay with.  She will see me in 1 year at which time we will perform a well woman exam in addition to her surveillance pelvic.             Diagnoses and all orders for this visit:    1. History of endometrial cancer (Primary)  -     ; Future      Pain assessment was performed today as a part of patient’s care.  For patients with pain related to surgery, gynecologic malignancy or cancer treatment, the plan is as noted in the assessment/plan.  For patients with pain not related to these issues, they are to seek any further needed care from a  more appropriate provider, such as PCP.    Follow-up:     Return to clinic in 1 year for Annual exam and ongoing cancer surveillance.      Electronically signed by EVER Grande on 12/16/2024

## (undated) DEVICE — MANIP UTER RUMI TP 6.7MMX8CM BLU

## (undated) DEVICE — [HIGH FLOW INSUFFLATOR,  DO NOT USE IF PACKAGE IS DAMAGED,  KEEP DRY,  KEEP AWAY FROM SUNLIGHT,  PROTECT FROM HEAT AND RADIOACTIVE SOURCES.]: Brand: PNEUMOSURE

## (undated) DEVICE — SUT MNCRYL PLS ANTIB UD 3/0 PS2 27IN

## (undated) DEVICE — GLV SURG DERMASSURE GRN LF PF SZ 6.5

## (undated) DEVICE — COVER,LIGHT HANDLE,FLX,1/PK: Brand: MEDLINE INDUSTRIES, INC.

## (undated) DEVICE — FLTR PLUMEPORT LAP W/CONN STRL

## (undated) DEVICE — OCCL COLPO PNEUMO  STRL

## (undated) DEVICE — TUBING, SUCTION, 1/4" X 10', STRAIGHT: Brand: MEDLINE

## (undated) DEVICE — KIT INCLUDES:GTB14, ALEXIS CONTAINED EXTRACTION SYSTEMC0R47, 12X100 KII BALLOON BLUNT TIP TROCAR: Brand: ALEXIS CONTAINED EXTRACTION SYSTEM WITH KII BALLOON BLUNT TIP SYSTEM

## (undated) DEVICE — APPL CHLORAPREP W/TINT 26ML BLU

## (undated) DEVICE — PK MAJ GYN DAVINCI 10

## (undated) DEVICE — Device

## (undated) DEVICE — GLV SURG SENSICARE MICRO PF LF 6 STRL

## (undated) DEVICE — INTENDED FOR TISSUE SEPARATION, AND OTHER PROCEDURES THAT REQUIRE A SHARP SURGICAL BLADE TO PUNCTURE OR CUT.: Brand: BARD-PARKER ® STAINLESS STEEL BLADES

## (undated) DEVICE — ANTIBACTERIAL UNDYED BRAIDED (POLYGLACTIN 910), SYNTHETIC ABSORBABLE SURGICAL SUTURE: Brand: COATED VICRYL

## (undated) DEVICE — DRP ADAPT ALLY UTER POSTN SYS 1P/U

## (undated) DEVICE — MANIP UTER RUMI 2 KOH EFFICIENT 2.5CM BL

## (undated) DEVICE — MARKER,SKIN,W/RULER,DUAL,STOP: Brand: MEDLINE

## (undated) DEVICE — SHEET, DRAPE, SPLIT, STERILE: Brand: MEDLINE

## (undated) DEVICE — BNDR ABD PREMIUM/UNIV 10IN 27TO48IN

## (undated) DEVICE — MEDI-VAC YANKAUER SUCTION HANDLE W/BULBOUS TIP: Brand: CARDINAL HEALTH

## (undated) DEVICE — BOWL UTIL STRL 32OZ

## (undated) DEVICE — OBT BLADLES ENDOWRIST DAVINCI/S 8MM

## (undated) DEVICE — ENDOPATH XCEL BLADELESS TROCARS WITH STABILITY SLEEVES: Brand: ENDOPATH XCEL

## (undated) DEVICE — PAD ARMBRD SURG CONVOL 7.5X20X2IN

## (undated) DEVICE — ANTIBACTERIAL UNDYED BRAIDED (POLYGLACTIN 910), SYNTHETIC ABSORBABLE SUTURE: Brand: COATED VICRYL

## (undated) DEVICE — TIP COVER ACCESSORY

## (undated) DEVICE — SKIN AFFIX SURG ADHESIVE 72/CS 0.55ML: Brand: MEDLINE